# Patient Record
Sex: FEMALE | Race: WHITE | NOT HISPANIC OR LATINO | Employment: OTHER | URBAN - NONMETROPOLITAN AREA
[De-identification: names, ages, dates, MRNs, and addresses within clinical notes are randomized per-mention and may not be internally consistent; named-entity substitution may affect disease eponyms.]

---

## 2017-01-29 ENCOUNTER — TRANSCRIBE ORDERS (OUTPATIENT)
Dept: ADMINISTRATIVE | Facility: HOSPITAL | Age: 32
End: 2017-01-29

## 2017-01-29 ENCOUNTER — LAB (OUTPATIENT)
Dept: LAB | Facility: HOSPITAL | Age: 32
End: 2017-01-29

## 2017-01-29 DIAGNOSIS — M32.14: Primary | ICD-10-CM

## 2017-01-29 DIAGNOSIS — M32.14: ICD-10-CM

## 2017-01-29 LAB
ALBUMIN SERPL-MCNC: 4.4 G/DL (ref 3.5–5)
ANION GAP SERPL CALCULATED.3IONS-SCNC: 11.8 MMOL/L (ref 3.6–11.2)
BACTERIA UR QL AUTO: ABNORMAL /HPF
BASOPHILS # BLD AUTO: 0.01 10*3/MM3 (ref 0–0.3)
BASOPHILS NFR BLD AUTO: 0.2 % (ref 0–2)
BILIRUB UR QL STRIP: NEGATIVE
BUN BLD-MCNC: 6 MG/DL (ref 7–21)
BUN/CREAT SERPL: 8.2 (ref 7–25)
CALCIUM SPEC-SCNC: 9.2 MG/DL (ref 7.7–10)
CHLORIDE SERPL-SCNC: 109 MMOL/L (ref 99–112)
CLARITY UR: ABNORMAL
CO2 SERPL-SCNC: 21.2 MMOL/L (ref 24.3–31.9)
COLOR UR: YELLOW
CREAT BLD-MCNC: 0.73 MG/DL (ref 0.43–1.29)
CREAT UR-MCNC: 292.5 MG/DL
DEPRECATED RDW RBC AUTO: 44 FL (ref 37–54)
EOSINOPHIL # BLD AUTO: 0.08 10*3/MM3 (ref 0–0.7)
EOSINOPHIL NFR BLD AUTO: 1.5 % (ref 0–5)
ERYTHROCYTE [DISTWIDTH] IN BLOOD BY AUTOMATED COUNT: 13.6 % (ref 11.5–14.5)
GFR SERPL CREATININE-BSD FRML MDRD: 92 ML/MIN/1.73
GLUCOSE BLD-MCNC: 91 MG/DL (ref 70–110)
GLUCOSE UR STRIP-MCNC: NEGATIVE MG/DL
HCT VFR BLD AUTO: 45.8 % (ref 37–47)
HGB BLD-MCNC: 14.5 G/DL (ref 12–16)
HGB UR QL STRIP.AUTO: NEGATIVE
HYALINE CASTS UR QL AUTO: ABNORMAL /LPF
IMM GRANULOCYTES # BLD: 0.01 10*3/MM3 (ref 0–0.03)
IMM GRANULOCYTES NFR BLD: 0.2 % (ref 0–0.5)
KETONES UR QL STRIP: NEGATIVE
LEUKOCYTE ESTERASE UR QL STRIP.AUTO: ABNORMAL
LYMPHOCYTES # BLD AUTO: 2.04 10*3/MM3 (ref 1–3)
LYMPHOCYTES NFR BLD AUTO: 37.8 % (ref 21–51)
MCH RBC QN AUTO: 27.7 PG (ref 27–33)
MCHC RBC AUTO-ENTMCNC: 31.7 G/DL (ref 33–37)
MCV RBC AUTO: 87.6 FL (ref 80–94)
MICRO TOTAL PROTEIN: 18.3 MG/DL
MONOCYTES # BLD AUTO: 0.49 10*3/MM3 (ref 0.1–0.9)
MONOCYTES NFR BLD AUTO: 9.1 % (ref 0–10)
NEUTROPHILS # BLD AUTO: 2.77 10*3/MM3 (ref 1.4–6.5)
NEUTROPHILS NFR BLD AUTO: 51.2 % (ref 30–70)
NITRITE UR QL STRIP: NEGATIVE
PH UR STRIP.AUTO: <=5 [PH] (ref 5–8)
PHOSPHATE SERPL-MCNC: 3.5 MG/DL (ref 2.7–4.5)
PLATELET # BLD AUTO: 159 10*3/MM3 (ref 130–400)
PMV BLD AUTO: 11.2 FL (ref 6–10)
POTASSIUM BLD-SCNC: 4.2 MMOL/L (ref 3.5–5.3)
PROT UR QL STRIP: ABNORMAL
RBC # BLD AUTO: 5.23 10*6/MM3 (ref 4.2–5.4)
RBC # UR: ABNORMAL /HPF
REF LAB TEST METHOD: ABNORMAL
SODIUM BLD-SCNC: 142 MMOL/L (ref 135–153)
SP GR UR STRIP: 1.02 (ref 1–1.03)
SQUAMOUS #/AREA URNS HPF: ABNORMAL /HPF
UROBILINOGEN UR QL STRIP: ABNORMAL
WBC NRBC COR # BLD: 5.4 10*3/MM3 (ref 4.5–12.5)
WBC UR QL AUTO: ABNORMAL /HPF
YEAST URNS QL MICRO: ABNORMAL /HPF

## 2017-01-29 PROCEDURE — 80069 RENAL FUNCTION PANEL: CPT | Performed by: INTERNAL MEDICINE

## 2017-01-29 PROCEDURE — 84156 ASSAY OF PROTEIN URINE: CPT | Performed by: INTERNAL MEDICINE

## 2017-01-29 PROCEDURE — 81001 URINALYSIS AUTO W/SCOPE: CPT | Performed by: INTERNAL MEDICINE

## 2017-01-29 PROCEDURE — 82570 ASSAY OF URINE CREATININE: CPT | Performed by: INTERNAL MEDICINE

## 2017-01-29 PROCEDURE — 36415 COLL VENOUS BLD VENIPUNCTURE: CPT

## 2017-01-29 PROCEDURE — 85025 COMPLETE CBC W/AUTO DIFF WBC: CPT | Performed by: INTERNAL MEDICINE

## 2017-02-14 ENCOUNTER — LAB (OUTPATIENT)
Dept: LAB | Facility: HOSPITAL | Age: 32
End: 2017-02-14

## 2017-02-14 ENCOUNTER — TRANSCRIBE ORDERS (OUTPATIENT)
Dept: ADMINISTRATIVE | Facility: HOSPITAL | Age: 32
End: 2017-02-14

## 2017-02-14 DIAGNOSIS — G40.209 COMPLEX PARTIAL SEIZURE EVOLVING TO GENERALIZED SEIZURE (HCC): ICD-10-CM

## 2017-02-14 DIAGNOSIS — G40.209 COMPLEX PARTIAL SEIZURE EVOLVING TO GENERALIZED SEIZURE (HCC): Primary | ICD-10-CM

## 2017-02-14 LAB
ALBUMIN SERPL-MCNC: 4.5 G/DL (ref 3.5–5)
ALBUMIN/GLOB SERPL: 1.6 G/DL (ref 1.5–2.5)
ALP SERPL-CCNC: 67 U/L (ref 35–104)
ALT SERPL W P-5'-P-CCNC: 18 U/L (ref 10–36)
ANION GAP SERPL CALCULATED.3IONS-SCNC: 3.1 MMOL/L (ref 3.6–11.2)
AST SERPL-CCNC: 27 U/L (ref 10–30)
BASOPHILS # BLD AUTO: 0.01 10*3/MM3 (ref 0–0.3)
BASOPHILS NFR BLD AUTO: 0.2 % (ref 0–2)
BILIRUB SERPL-MCNC: 0.2 MG/DL (ref 0.2–1.8)
BUN BLD-MCNC: 8 MG/DL (ref 7–21)
BUN/CREAT SERPL: 11.4 (ref 7–25)
CALCIUM SPEC-SCNC: 9.5 MG/DL (ref 7.7–10)
CHLORIDE SERPL-SCNC: 107 MMOL/L (ref 99–112)
CO2 SERPL-SCNC: 29.9 MMOL/L (ref 24.3–31.9)
CREAT BLD-MCNC: 0.7 MG/DL (ref 0.43–1.29)
DEPRECATED RDW RBC AUTO: 42.5 FL (ref 37–54)
EOSINOPHIL # BLD AUTO: 0.12 10*3/MM3 (ref 0–0.7)
EOSINOPHIL NFR BLD AUTO: 2.6 % (ref 0–5)
ERYTHROCYTE [DISTWIDTH] IN BLOOD BY AUTOMATED COUNT: 13.5 % (ref 11.5–14.5)
GFR SERPL CREATININE-BSD FRML MDRD: 97 ML/MIN/1.73
GLOBULIN UR ELPH-MCNC: 2.8 GM/DL
GLUCOSE BLD-MCNC: 87 MG/DL (ref 70–110)
HCT VFR BLD AUTO: 43.1 % (ref 37–47)
HGB BLD-MCNC: 13.5 G/DL (ref 12–16)
IMM GRANULOCYTES # BLD: 0 10*3/MM3 (ref 0–0.03)
IMM GRANULOCYTES NFR BLD: 0 % (ref 0–0.5)
LYMPHOCYTES # BLD AUTO: 2.18 10*3/MM3 (ref 1–3)
LYMPHOCYTES NFR BLD AUTO: 46.8 % (ref 21–51)
MCH RBC QN AUTO: 27.5 PG (ref 27–33)
MCHC RBC AUTO-ENTMCNC: 31.3 G/DL (ref 33–37)
MCV RBC AUTO: 87.8 FL (ref 80–94)
MONOCYTES # BLD AUTO: 0.45 10*3/MM3 (ref 0.1–0.9)
MONOCYTES NFR BLD AUTO: 9.7 % (ref 0–10)
NEUTROPHILS # BLD AUTO: 1.9 10*3/MM3 (ref 1.4–6.5)
NEUTROPHILS NFR BLD AUTO: 40.7 % (ref 30–70)
OSMOLALITY SERPL CALC.SUM OF ELEC: 277.1 MOSM/KG (ref 273–305)
PLATELET # BLD AUTO: 206 10*3/MM3 (ref 130–400)
PMV BLD AUTO: 11.3 FL (ref 6–10)
POTASSIUM BLD-SCNC: 4.1 MMOL/L (ref 3.5–5.3)
PROT SERPL-MCNC: 7.3 G/DL (ref 6–8)
RBC # BLD AUTO: 4.91 10*6/MM3 (ref 4.2–5.4)
SODIUM BLD-SCNC: 140 MMOL/L (ref 135–153)
WBC NRBC COR # BLD: 4.66 10*3/MM3 (ref 4.5–12.5)

## 2017-02-14 PROCEDURE — 80053 COMPREHEN METABOLIC PANEL: CPT | Performed by: PSYCHIATRY & NEUROLOGY

## 2017-02-14 PROCEDURE — 36415 COLL VENOUS BLD VENIPUNCTURE: CPT

## 2017-02-14 PROCEDURE — 85025 COMPLETE CBC W/AUTO DIFF WBC: CPT | Performed by: PSYCHIATRY & NEUROLOGY

## 2017-07-27 ENCOUNTER — OFFICE VISIT (OUTPATIENT)
Dept: UROLOGY | Facility: CLINIC | Age: 32
End: 2017-07-27

## 2017-07-27 ENCOUNTER — HOSPITAL ENCOUNTER (OUTPATIENT)
Dept: GENERAL RADIOLOGY | Facility: HOSPITAL | Age: 32
Discharge: HOME | End: 2017-07-27
Admitting: NURSE PRACTITIONER

## 2017-07-27 DIAGNOSIS — N20.0 RENAL STONE: Primary | ICD-10-CM

## 2017-07-27 DIAGNOSIS — N20.0 RENAL STONES: Primary | ICD-10-CM

## 2017-07-27 LAB
B-HCG UR QL: NEGATIVE
INTERNAL NEGATIVE CONTROL: NEGATIVE
INTERNAL POSITIVE CONTROL: POSITIVE
Lab: NORMAL

## 2017-07-27 PROCEDURE — 99213 OFFICE O/P EST LOW 20 MIN: CPT | Performed by: NURSE PRACTITIONER

## 2017-07-27 PROCEDURE — 74000 XR ABDOMEN KUB: CPT | Performed by: RADIOLOGY

## 2017-07-27 PROCEDURE — 74000 HC ABDOMEN KUB: CPT

## 2017-07-27 NOTE — PROGRESS NOTES
Chief Complaint:          Chief Complaint   Patient presents with   • Nephrolithiasis       HPI:   32 y.o. female being seen in the office today for 1 year follow-up for history of renal stones. Patient had KUB prior to appointment today. Patient denies any difficulty with urination nor does she complain of any back or flank pain.    HPI        Past Medical History:        Past Medical History:   Diagnosis Date   • Hypertension    • Lupus    • Migraine    • Stroke          Current Meds:     Current Outpatient Prescriptions   Medication Sig Dispense Refill   • APTIOM 800 MG tablet      • HYDROcodone-acetaminophen (NORCO) 5-325 MG per tablet      • hydroxychloroquine (PLAQUENIL) 200 MG tablet      • nadolol (CORGARD) 40 MG tablet      • naproxen (NAPROSYN) 500 MG tablet      • nortriptyline (PAMELOR) 10 MG capsule      • promethazine (PHENERGAN) 25 MG tablet Take  by mouth.     • promethazine (PHENERGAN) 25 MG tablet      • SUMAtriptan (IMITREX) 100 MG tablet      • tiZANidine (ZANAFLEX) 2 MG tablet      • traMADol (ULTRAM) 50 MG tablet Take  by mouth.       No current facility-administered medications for this visit.         Allergies:      Allergies   Allergen Reactions   • Dilantin [Phenytoin]         Past Surgical History:     Past Surgical History:   Procedure Laterality Date   • GALLBLADDER SURGERY     • KIDNEY STONE SURGERY           Social History:     Social History     Social History   • Marital status: Single     Spouse name: N/A   • Number of children: N/A   • Years of education: N/A     Occupational History   • Not on file.     Social History Main Topics   • Smoking status: Never Smoker   • Smokeless tobacco: Not on file   • Alcohol use No   • Drug use: No   • Sexual activity: Not on file     Other Topics Concern   • Not on file     Social History Narrative       Family History:     Family History   Problem Relation Age of Onset   • Diabetes Father        Review of Systems:     Review of Systems    Constitutional: Negative for chills, fatigue and fever.   Respiratory: Negative for cough, shortness of breath and wheezing.    Cardiovascular: Negative for leg swelling.   Gastrointestinal: Negative for abdominal pain, nausea and vomiting.   Musculoskeletal: Negative for back pain and joint swelling.   Neurological: Negative for dizziness and headaches.   Psychiatric/Behavioral: Negative for confusion.       Physical Exam:     Physical Exam   Constitutional: She is oriented to person, place, and time. She appears well-developed and well-nourished. No distress.   Musculoskeletal: Normal range of motion.   Neurological: She is alert and oriented to person, place, and time.   Skin: Skin is warm and dry. No rash noted. She is not diaphoretic. No erythema. No pallor.   Psychiatric: She has a normal mood and affect. Her behavior is normal. Judgment and thought content normal.   Nursing note and vitals reviewed.      Procedure:     No notes on file      Assessment:     Encounter Diagnosis   Name Primary?   • Renal stone Yes     No orders of the defined types were placed in this encounter.      Plan:   Reviewed the KUB with the patient revealing a persistent 9 mm stone in the left renal collecting system in a nonobstructive position. Discussed treatment options with the patient regarding conservative monitoring versus an ESWL. Patient states she wants to have stone removed so we will schedule her for an ESWL or treatment of the stone. Discussed the procedure with the patient and she is a agreeable.    Counseling was given to patient for the following topics diagnostic results, patient and family education, impressions and risks and benefits of treatment options. and the interim medical history and current results were reviewed.  A treatment plan with follow-up was made. Total time of the encounter was 18 minutes and 18 minutes were spent discussing Renal stone [N20.0] face-to-face.       This document has been  electronically signed by DANIEL Abad July 27, 2017 10:53 AM

## 2017-08-14 PROBLEM — N20.0 RENAL STONE: Status: ACTIVE | Noted: 2017-08-14

## 2017-08-16 ENCOUNTER — DOCUMENTATION (OUTPATIENT)
Dept: UROLOGY | Facility: CLINIC | Age: 32
End: 2017-08-16

## 2017-08-16 NOTE — PROGRESS NOTES
Patient notified of all pre-op and surgery appointment dates and times. Pre-op instructions were reviewed with the patient, patient voiced understanding.

## 2017-08-17 ENCOUNTER — APPOINTMENT (OUTPATIENT)
Dept: PREADMISSION TESTING | Facility: HOSPITAL | Age: 32
End: 2017-08-17

## 2017-08-17 VITALS — BODY MASS INDEX: 32.58 KG/M2 | WEIGHT: 215 LBS | HEIGHT: 68 IN

## 2017-08-17 DIAGNOSIS — N20.0 RENAL STONE: ICD-10-CM

## 2017-08-17 LAB
ANION GAP SERPL CALCULATED.3IONS-SCNC: 5.6 MMOL/L (ref 3.6–11.2)
BUN BLD-MCNC: 13 MG/DL (ref 7–21)
BUN/CREAT SERPL: 20.3 (ref 7–25)
CALCIUM SPEC-SCNC: 9.7 MG/DL (ref 7.7–10)
CHLORIDE SERPL-SCNC: 112 MMOL/L (ref 99–112)
CO2 SERPL-SCNC: 23.4 MMOL/L (ref 24.3–31.9)
CREAT BLD-MCNC: 0.64 MG/DL (ref 0.43–1.29)
DEPRECATED RDW RBC AUTO: 45.4 FL (ref 37–54)
ERYTHROCYTE [DISTWIDTH] IN BLOOD BY AUTOMATED COUNT: 14.1 % (ref 11.5–14.5)
GFR SERPL CREATININE-BSD FRML MDRD: 107 ML/MIN/1.73
GLUCOSE BLD-MCNC: 87 MG/DL (ref 70–110)
HCT VFR BLD AUTO: 43.2 % (ref 37–47)
HGB BLD-MCNC: 13.8 G/DL (ref 12–16)
MCH RBC QN AUTO: 28.2 PG (ref 27–33)
MCHC RBC AUTO-ENTMCNC: 31.9 G/DL (ref 33–37)
MCV RBC AUTO: 88.2 FL (ref 80–94)
OSMOLALITY SERPL CALC.SUM OF ELEC: 280.7 MOSM/KG (ref 273–305)
PLATELET # BLD AUTO: 170 10*3/MM3 (ref 130–400)
PMV BLD AUTO: 11.1 FL (ref 6–10)
POTASSIUM BLD-SCNC: 4 MMOL/L (ref 3.5–5.3)
RBC # BLD AUTO: 4.9 10*6/MM3 (ref 4.2–5.4)
SODIUM BLD-SCNC: 141 MMOL/L (ref 135–153)
WBC NRBC COR # BLD: 7.29 10*3/MM3 (ref 4.5–12.5)

## 2017-08-17 PROCEDURE — 36415 COLL VENOUS BLD VENIPUNCTURE: CPT

## 2017-08-17 PROCEDURE — 85027 COMPLETE CBC AUTOMATED: CPT | Performed by: NURSE PRACTITIONER

## 2017-08-17 PROCEDURE — 80048 BASIC METABOLIC PNL TOTAL CA: CPT | Performed by: NURSE PRACTITIONER

## 2017-08-17 RX ORDER — DULOXETIN HYDROCHLORIDE 30 MG/1
30 CAPSULE, DELAYED RELEASE ORAL DAILY
COMMUNITY
End: 2019-05-10

## 2017-08-17 RX ORDER — RANITIDINE 150 MG/1
150 TABLET ORAL NIGHTLY
COMMUNITY
End: 2019-05-10 | Stop reason: ALTCHOICE

## 2017-08-17 NOTE — DISCHARGE INSTRUCTIONS
0830---8-   ARRIVAL TIME  TAKE the following medications the morning of surgery:  All heart or blood pressure medications    HOLD all diabetic medications the morning of surgery as ordered by physician.    Please discontinue all blood thinners and anticoagulants (except aspirin) prior to surgery as per your surgeon and cardiologist instructions.  Aspirin may be continued up to the day prior to surgery.     General Instructions:  · Do not eat or drink after midnight: includes water, mints, or gum. You may brush your teeth.  Dental appliances that are removable must be taken out day of surgery.  · Do not smoke, chew tobacco, or drink alcohol.  · Bring medications in original bottles, any inhalers and if applicable your C-PAP/BI-PAP machine.  · Bring any papers given to you in the doctor's office.  · Wear clean comfortable clothes and socks.  · Do not wear contact lenses or make-up. Bring a case for your glasses if applicable.  · Bring crutches or walker if applicable.  · Leave all other valuables and jewelry at home.    If you were given a blood bank ID arm band remember to bring it with you the day of surgery.    Preventing a Surgical Site Infection:  Shower on the morning of surgery using a fresh bar of anti-bacterial soap (such as Dial) and clean washcloth. Dry with a clean towel and dress in clean clothing.  For 2 to 3 days before surgery, avoid shaving with a razor near where you will have surgery because the razor can irritate skin and make it easier to develop an infection. Ask your surgeon if you will be receiving antibiotics prior to surgery.  Make sure you, your family, and all healthcare providers clear their hands with soap and water or an alcohol-based hand  before caring for you or your wound.  If at all possible, quit smoking as many days before surgery as you can.    Day of surgery:  Upon arrival, a Pre-op nurse and Anesthesiologist will review your health history, obtain vital signs,  and answer questions you may have. The only belongings needed at this time will be your home medications and if applicable your C-PAP/BI-PAP machine. If you are staying overnight your family can leave the rest of your belongings in the car and bring them to your room later. A Pre-op nurse will start an IV and you may receive medication in preparation for surgery, including something to help you relax. Your family will be able to see you in the Pre-op area. While you are in surgery your family should notify the waiting room  if they leave the waiting room area and provide a contact phone number.    Please be aware that surgery does come with discomfort. We want to make every effort to control your discomfort so please discuss any uncontrolled symptoms with your nurse. Your doctor will most likely have prescribed pain medications.    If you are going home after surgery you will receive individualized written care instructions before being discharged. A responsible adult must drive you to and from the hospital on the day of surgery and stay with you for 24 hours.    If you are staying overnight following surgery, you will be transported to your hospital room following the recovery period.  [facility] has all private rooms.    If you have any questions please call Pre-Admission Testing at [phone].  Deductibles and co-payments are collected on the day of service. Please be prepared to pay the required co-pay, deductible or deposit on the day of service as defined by your plan.

## 2017-08-18 ENCOUNTER — ANESTHESIA EVENT (OUTPATIENT)
Dept: PERIOP | Facility: HOSPITAL | Age: 32
End: 2017-08-18

## 2017-08-18 ENCOUNTER — ANESTHESIA (OUTPATIENT)
Dept: PERIOP | Facility: HOSPITAL | Age: 32
End: 2017-08-18

## 2017-08-18 ENCOUNTER — HOSPITAL ENCOUNTER (OUTPATIENT)
Facility: HOSPITAL | Age: 32
Setting detail: HOSPITAL OUTPATIENT SURGERY
Discharge: HOME | End: 2017-08-18
Attending: UROLOGY | Admitting: UROLOGY

## 2017-08-18 VITALS
HEART RATE: 72 BPM | RESPIRATION RATE: 16 BRPM | OXYGEN SATURATION: 98 % | TEMPERATURE: 97.9 F | BODY MASS INDEX: 33.34 KG/M2 | DIASTOLIC BLOOD PRESSURE: 74 MMHG | WEIGHT: 220 LBS | SYSTOLIC BLOOD PRESSURE: 132 MMHG | HEIGHT: 68 IN

## 2017-08-18 DIAGNOSIS — N20.0 RENAL STONE: ICD-10-CM

## 2017-08-18 PROCEDURE — C1769 GUIDE WIRE: HCPCS | Performed by: UROLOGY

## 2017-08-18 PROCEDURE — 25010000002 MIDAZOLAM PER 1 MG: Performed by: NURSE ANESTHETIST, CERTIFIED REGISTERED

## 2017-08-18 PROCEDURE — 25010000002 FENTANYL CITRATE (PF) 100 MCG/2ML SOLUTION: Performed by: NURSE ANESTHETIST, CERTIFIED REGISTERED

## 2017-08-18 PROCEDURE — 25010000003 CEFAZOLIN PER 500 MG: Performed by: NURSE PRACTITIONER

## 2017-08-18 PROCEDURE — 25010000002 MIDAZOLAM PER 1 MG: Performed by: ANESTHESIOLOGY

## 2017-08-18 PROCEDURE — 25010000002 ONDANSETRON PER 1 MG: Performed by: NURSE ANESTHETIST, CERTIFIED REGISTERED

## 2017-08-18 PROCEDURE — C1894 INTRO/SHEATH, NON-LASER: HCPCS | Performed by: UROLOGY

## 2017-08-18 PROCEDURE — 50590 FRAGMENTING OF KIDNEY STONE: CPT | Performed by: UROLOGY

## 2017-08-18 PROCEDURE — 25010000002 PROPOFOL 10 MG/ML EMULSION: Performed by: NURSE ANESTHETIST, CERTIFIED REGISTERED

## 2017-08-18 PROCEDURE — 25010000002 NEOSTIGMINE 10 MG/10ML SOLUTION: Performed by: NURSE ANESTHETIST, CERTIFIED REGISTERED

## 2017-08-18 PROCEDURE — 25010000002 DEXAMETHASONE PER 1 MG: Performed by: NURSE ANESTHETIST, CERTIFIED REGISTERED

## 2017-08-18 RX ORDER — NEOSTIGMINE METHYLSULFATE 1 MG/ML
INJECTION, SOLUTION INTRAVENOUS AS NEEDED
Status: DISCONTINUED | OUTPATIENT
Start: 2017-08-18 | End: 2017-08-18 | Stop reason: SURG

## 2017-08-18 RX ORDER — DEXAMETHASONE SODIUM PHOSPHATE 10 MG/ML
INJECTION INTRAMUSCULAR; INTRAVENOUS AS NEEDED
Status: DISCONTINUED | OUTPATIENT
Start: 2017-08-18 | End: 2017-08-18 | Stop reason: SURG

## 2017-08-18 RX ORDER — SODIUM CHLORIDE 0.9 % (FLUSH) 0.9 %
1-10 SYRINGE (ML) INJECTION AS NEEDED
Status: DISCONTINUED | OUTPATIENT
Start: 2017-08-18 | End: 2017-08-18 | Stop reason: HOSPADM

## 2017-08-18 RX ORDER — MIDAZOLAM HYDROCHLORIDE 1 MG/ML
1 INJECTION INTRAMUSCULAR; INTRAVENOUS
Status: DISCONTINUED | OUTPATIENT
Start: 2017-08-18 | End: 2017-08-18 | Stop reason: HOSPADM

## 2017-08-18 RX ORDER — MIDAZOLAM HYDROCHLORIDE 1 MG/ML
2 INJECTION INTRAMUSCULAR; INTRAVENOUS
Status: DISCONTINUED | OUTPATIENT
Start: 2017-08-18 | End: 2017-08-18 | Stop reason: HOSPADM

## 2017-08-18 RX ORDER — SODIUM CHLORIDE, SODIUM LACTATE, POTASSIUM CHLORIDE, CALCIUM CHLORIDE 600; 310; 30; 20 MG/100ML; MG/100ML; MG/100ML; MG/100ML
125 INJECTION, SOLUTION INTRAVENOUS CONTINUOUS
Status: DISCONTINUED | OUTPATIENT
Start: 2017-08-18 | End: 2017-08-18 | Stop reason: HOSPADM

## 2017-08-18 RX ORDER — ONDANSETRON 2 MG/ML
4 INJECTION INTRAMUSCULAR; INTRAVENOUS ONCE AS NEEDED
Status: DISCONTINUED | OUTPATIENT
Start: 2017-08-18 | End: 2017-08-18 | Stop reason: HOSPADM

## 2017-08-18 RX ORDER — OXYCODONE HYDROCHLORIDE AND ACETAMINOPHEN 5; 325 MG/1; MG/1
TABLET ORAL
Qty: 40 TABLET | Refills: 0 | Status: SHIPPED | OUTPATIENT
Start: 2017-08-18 | End: 2019-05-10

## 2017-08-18 RX ORDER — SULFAMETHOXAZOLE AND TRIMETHOPRIM 800; 160 MG/1; MG/1
1 TABLET ORAL 2 TIMES DAILY
Qty: 20 TABLET | Refills: 0 | Status: SHIPPED | OUTPATIENT
Start: 2017-08-18 | End: 2019-05-10

## 2017-08-18 RX ORDER — ROCURONIUM BROMIDE 10 MG/ML
INJECTION, SOLUTION INTRAVENOUS AS NEEDED
Status: DISCONTINUED | OUTPATIENT
Start: 2017-08-18 | End: 2017-08-18 | Stop reason: SURG

## 2017-08-18 RX ORDER — FENTANYL CITRATE 50 UG/ML
50 INJECTION, SOLUTION INTRAMUSCULAR; INTRAVENOUS
Status: DISCONTINUED | OUTPATIENT
Start: 2017-08-18 | End: 2017-08-18 | Stop reason: HOSPADM

## 2017-08-18 RX ORDER — ONDANSETRON 2 MG/ML
INJECTION INTRAMUSCULAR; INTRAVENOUS AS NEEDED
Status: DISCONTINUED | OUTPATIENT
Start: 2017-08-18 | End: 2017-08-18 | Stop reason: SURG

## 2017-08-18 RX ORDER — MAGNESIUM HYDROXIDE 1200 MG/15ML
LIQUID ORAL AS NEEDED
Status: DISCONTINUED | OUTPATIENT
Start: 2017-08-18 | End: 2017-08-18 | Stop reason: HOSPADM

## 2017-08-18 RX ORDER — FAMOTIDINE 10 MG/ML
INJECTION, SOLUTION INTRAVENOUS AS NEEDED
Status: DISCONTINUED | OUTPATIENT
Start: 2017-08-18 | End: 2017-08-18 | Stop reason: SURG

## 2017-08-18 RX ORDER — OXYCODONE HYDROCHLORIDE AND ACETAMINOPHEN 5; 325 MG/1; MG/1
1 TABLET ORAL ONCE AS NEEDED
Status: DISCONTINUED | OUTPATIENT
Start: 2017-08-18 | End: 2017-08-18 | Stop reason: HOSPADM

## 2017-08-18 RX ORDER — PROPOFOL 10 MG/ML
VIAL (ML) INTRAVENOUS AS NEEDED
Status: DISCONTINUED | OUTPATIENT
Start: 2017-08-18 | End: 2017-08-18 | Stop reason: SURG

## 2017-08-18 RX ORDER — FENTANYL CITRATE 50 UG/ML
INJECTION, SOLUTION INTRAMUSCULAR; INTRAVENOUS AS NEEDED
Status: DISCONTINUED | OUTPATIENT
Start: 2017-08-18 | End: 2017-08-18 | Stop reason: SURG

## 2017-08-18 RX ORDER — GLYCOPYRROLATE 0.2 MG/ML
INJECTION INTRAMUSCULAR; INTRAVENOUS AS NEEDED
Status: DISCONTINUED | OUTPATIENT
Start: 2017-08-18 | End: 2017-08-18 | Stop reason: SURG

## 2017-08-18 RX ORDER — IPRATROPIUM BROMIDE AND ALBUTEROL SULFATE 2.5; .5 MG/3ML; MG/3ML
3 SOLUTION RESPIRATORY (INHALATION) ONCE AS NEEDED
Status: DISCONTINUED | OUTPATIENT
Start: 2017-08-18 | End: 2017-08-18 | Stop reason: HOSPADM

## 2017-08-18 RX ORDER — LIDOCAINE HYDROCHLORIDE 20 MG/ML
INJECTION, SOLUTION INFILTRATION; PERINEURAL AS NEEDED
Status: DISCONTINUED | OUTPATIENT
Start: 2017-08-18 | End: 2017-08-18 | Stop reason: SURG

## 2017-08-18 RX ORDER — MIDAZOLAM HYDROCHLORIDE 1 MG/ML
INJECTION INTRAMUSCULAR; INTRAVENOUS AS NEEDED
Status: DISCONTINUED | OUTPATIENT
Start: 2017-08-18 | End: 2017-08-18 | Stop reason: SURG

## 2017-08-18 RX ORDER — MEPERIDINE HYDROCHLORIDE 25 MG/ML
12.5 INJECTION INTRAMUSCULAR; INTRAVENOUS; SUBCUTANEOUS
Status: DISCONTINUED | OUTPATIENT
Start: 2017-08-18 | End: 2017-08-18 | Stop reason: HOSPADM

## 2017-08-18 RX ADMIN — ONDANSETRON 4 MG: 2 INJECTION, SOLUTION INTRAMUSCULAR; INTRAVENOUS at 10:54

## 2017-08-18 RX ADMIN — CEFAZOLIN SODIUM 2 G: 2 SOLUTION INTRAVENOUS at 10:50

## 2017-08-18 RX ADMIN — ROCURONIUM BROMIDE 30 MG: 10 INJECTION INTRAVENOUS at 10:54

## 2017-08-18 RX ADMIN — FENTANYL CITRATE 100 MCG: 50 INJECTION INTRAMUSCULAR; INTRAVENOUS at 10:50

## 2017-08-18 RX ADMIN — MIDAZOLAM HYDROCHLORIDE 2 MG: 1 INJECTION, SOLUTION INTRAMUSCULAR; INTRAVENOUS at 10:50

## 2017-08-18 RX ADMIN — LIDOCAINE HYDROCHLORIDE 60 MG: 20 INJECTION, SOLUTION INFILTRATION; PERINEURAL at 10:54

## 2017-08-18 RX ADMIN — SODIUM CHLORIDE, POTASSIUM CHLORIDE, SODIUM LACTATE AND CALCIUM CHLORIDE 125 ML/HR: 600; 310; 30; 20 INJECTION, SOLUTION INTRAVENOUS at 09:48

## 2017-08-18 RX ADMIN — PROPOFOL 150 MG: 10 INJECTION, EMULSION INTRAVENOUS at 10:54

## 2017-08-18 RX ADMIN — FENTANYL CITRATE 50 MCG: 50 INJECTION INTRAMUSCULAR; INTRAVENOUS at 12:15

## 2017-08-18 RX ADMIN — ONDANSETRON 4 MG: 2 INJECTION, SOLUTION INTRAMUSCULAR; INTRAVENOUS at 12:03

## 2017-08-18 RX ADMIN — GLYCOPYRROLATE 0.4 MG: 0.2 INJECTION, SOLUTION INTRAMUSCULAR; INTRAVENOUS at 11:51

## 2017-08-18 RX ADMIN — MIDAZOLAM HYDROCHLORIDE 2 MG: 1 INJECTION, SOLUTION INTRAMUSCULAR; INTRAVENOUS at 09:55

## 2017-08-18 RX ADMIN — FAMOTIDINE 20 MG: 10 INJECTION, SOLUTION INTRAVENOUS at 10:54

## 2017-08-18 RX ADMIN — DEXAMETHASONE SODIUM PHOSPHATE 4 MG: 10 INJECTION INTRAMUSCULAR; INTRAVENOUS at 10:54

## 2017-08-18 RX ADMIN — NEOSTIGMINE METHYLSULFATE 3 MG: 1 INJECTION, SOLUTION INTRAVENOUS at 11:51

## 2017-08-18 NOTE — ANESTHESIA PROCEDURE NOTES
Airway  Urgency: elective    Date/Time: 8/18/2017 10:55 AM  Airway not difficult    General Information and Staff    Patient location during procedure: OR  Anesthesiologist: LIZA BRAUN  CRNA: SHMUEL ABBOTT    Indications and Patient Condition  Indications for airway management: airway protection    Preoxygenated: yes  MILS maintained throughout  Mask difficulty assessment: 0 - not attempted    Final Airway Details  Final airway type: endotracheal airway      Successful airway: ETT  Cuffed: yes   Successful intubation technique: direct laryngoscopy  Facilitating devices/methods: intubating stylet  Endotracheal tube insertion site: oral  Blade: Omid  Blade size: #3  ETT size: 7.0 mm  Cormack-Lehane Classification: grade IIa - partial view of glottis  Placement verified by: chest auscultation, capnometry and palpation of cuff   Cuff volume (mL): 10  Measured from: lips  ETT to lips (cm): 21  Number of attempts at approach: 1    Additional Comments  Dentition and lips same as preop

## 2017-08-18 NOTE — H&P (VIEW-ONLY)
Chief Complaint:              Chief Complaint   Patient presents with   • Nephrolithiasis         HPI:   32 y.o. female being seen in the office today for 1 year follow-up for history of renal stones. Patient had KUB prior to appointment today. Patient denies any difficulty with urination nor does she complain of any back or flank pain.     HPI           Past Medical History:           Medical History         Past Medical History:   Diagnosis Date   • Hypertension     • Lupus     • Migraine     • Stroke                 Current Meds:       Current Medications           Current Outpatient Prescriptions   Medication Sig Dispense Refill   • APTIOM 800 MG tablet         • HYDROcodone-acetaminophen (NORCO) 5-325 MG per tablet         • hydroxychloroquine (PLAQUENIL) 200 MG tablet         • nadolol (CORGARD) 40 MG tablet         • naproxen (NAPROSYN) 500 MG tablet         • nortriptyline (PAMELOR) 10 MG capsule         • promethazine (PHENERGAN) 25 MG tablet Take  by mouth.       • promethazine (PHENERGAN) 25 MG tablet         • SUMAtriptan (IMITREX) 100 MG tablet         • tiZANidine (ZANAFLEX) 2 MG tablet         • traMADol (ULTRAM) 50 MG tablet Take  by mouth.          No current facility-administered medications for this visit.              Allergies:            Allergies   Allergen Reactions   • Dilantin [Phenytoin]            Past Surgical History:       Surgical History          Past Surgical History:   Procedure Laterality Date   • GALLBLADDER SURGERY       • KIDNEY STONE SURGERY                   Social History:       Social History    Social History            Social History   • Marital status: Single       Spouse name: N/A   • Number of children: N/A   • Years of education: N/A          Occupational History   • Not on file.           Social History Main Topics   • Smoking status: Never Smoker   • Smokeless tobacco: Not on file   • Alcohol use No   • Drug use: No   • Sexual activity: Not on file           Other  Topics Concern   • Not on file      Social History Narrative            Family History:            Family History   Problem Relation Age of Onset   • Diabetes Father           Review of Systems:      Review of Systems   Constitutional: Negative for chills, fatigue and fever.   Respiratory: Negative for cough, shortness of breath and wheezing.    Cardiovascular: Negative for leg swelling.   Gastrointestinal: Negative for abdominal pain, nausea and vomiting.   Musculoskeletal: Negative for back pain and joint swelling.   Neurological: Negative for dizziness and headaches.   Psychiatric/Behavioral: Negative for confusion.         Physical Exam:      Physical Exam   Constitutional: She is oriented to person, place, and time. She appears well-developed and well-nourished. No distress.   Musculoskeletal: Normal range of motion.   Neurological: She is alert and oriented to person, place, and time.   Skin: Skin is warm and dry. No rash noted. She is not diaphoretic. No erythema. No pallor.   Psychiatric: She has a normal mood and affect. Her behavior is normal. Judgment and thought content normal.   Nursing note and vitals reviewed.        Procedure:      No notes on file        Assessment:           Encounter Diagnosis   Name Primary?   • Renal stone Yes      No orders of the defined types were placed in this encounter.        Plan:   Reviewed the KUB with the patient revealing a persistent 9 mm stone in the left renal collecting system in a nonobstructive position. Discussed treatment options with the patient regarding conservative monitoring versus an ESWL. Patient states she wants to have stone removed so we will schedule her for an ESWL or treatment of the stone. Discussed the procedure with the patient and she is a agreeable.

## 2017-08-18 NOTE — OP NOTE
EXTRACORPOREAL SHOCKWAVE LITHOTRIPSY  Procedure Report    Patient Name:  Livia Benítez  YOB: 1985    Date of Surgery:  8/18/2017     Indications:  Persistent 1 cm calcification over left kidney    Pre-op Diagnosis:  Renal stone [N20.0]          Post-op Diagnosis:   Post-Op Diagnosis Codes:     * Renal stone [N20.0] possible nephrocalcinosis         Procedure/CPT® Codes:      Procedure(s): Left  EXTRACORPOREAL SHOCKWAVE LITHOTRIPSY, FLEXIBLE URETEROSCOPY    Staff:  Surgeon(s):  Driss Wharton MD         Anesthesia: General    Estimated Blood Loss: *No blood loss documented*    Implants:    Nothing was implanted during the procedure    Specimen:                * No specimens in log *      Findings: Pt has a 1 cm calcification seen on fluoroscopy and kub but with flexible ureteroscopy the stone was not seen to be in the left collecting system but on fluoroscopy the ureteroscopy was in the same planar location as the stone    Complications: none    Description of Procedure: pt underwent GA and prepped and draped.  Cystoscopy was performed and an amplatz was passed up the left ureter and then a ureteral access sheath was placed.  Flexible ureteroscopy was performed and the upper, middle and lower pole were examined with simultaneous fluoroscopy multiple times and there was no stone seen in the collecting system.  Because of a possible hidden calyx containing a stone we decided on proceeding with the left ESWL.  Pt has 2400 shocks at 24 kv were delivered to the stone.  Pt was taken to RR.      Driss Wharton MD     Date: 8/18/2017  Time: 11:34 AM

## 2017-08-18 NOTE — ANESTHESIA PREPROCEDURE EVALUATION
Anesthesia Evaluation     no history of anesthetic complications:  NPO Solid Status: > 8 hours  NPO Liquid Status: > 8 hours     Airway   Mallampati: II  TM distance: >3 FB  Neck ROM: full  no difficulty expected  Dental - normal exam     Pulmonary - normal exam   Cardiovascular - normal exam    (+) hypertension, PVD,       Neuro/Psych  (+) seizures, CVA, headaches,    GI/Hepatic/Renal/Endo      Musculoskeletal     Abdominal  - normal exam   Substance History      OB/GYN          Other                                      Anesthesia Plan    ASA 2     general     intravenous induction   Anesthetic plan and risks discussed with patient.

## 2017-08-18 NOTE — ANESTHESIA POSTPROCEDURE EVALUATION
Patient: Livia Benítez    Procedure Summary     Date Anesthesia Start Anesthesia Stop Room / Location    08/18/17 1050 1156 [facility] OR 06 / [facility]       Procedure Diagnosis Surgeon Provider    EXTRACORPOREAL SHOCKWAVE LITHOTRIPSY, FLEXIBLE URETEROSCOPY (Left ) Renal stone  (Renal stone [N20.0]) MD Driss Sears MD          Anesthesia Type: general  Last vitals  /74         Temp 97.9       Pulse    72   Resp     16   SpO2 98%     Post Anesthesia Care and Evaluation    Patient location during evaluation: PHASE II  Patient participation: complete - patient participated  Level of consciousness: awake and alert  Pain score: 1  Pain management: adequate  Airway patency: patent  Anesthetic complications: No anesthetic complications  PONV Status: controlled  Cardiovascular status: acceptable  Respiratory status: acceptable  Hydration status: acceptable

## 2017-10-04 ENCOUNTER — OFFICE VISIT (OUTPATIENT)
Dept: UROLOGY | Facility: CLINIC | Age: 32
End: 2017-10-04

## 2017-10-04 ENCOUNTER — HOSPITAL ENCOUNTER (OUTPATIENT)
Dept: GENERAL RADIOLOGY | Facility: HOSPITAL | Age: 32
Discharge: HOME | End: 2017-10-04
Attending: UROLOGY | Admitting: UROLOGY

## 2017-10-04 DIAGNOSIS — N20.0 RENAL STONES: Primary | ICD-10-CM

## 2017-10-04 DIAGNOSIS — N20.0 RENAL STONE: ICD-10-CM

## 2017-10-04 PROCEDURE — 99024 POSTOP FOLLOW-UP VISIT: CPT | Performed by: UROLOGY

## 2017-10-04 PROCEDURE — 74000 HC ABDOMEN KUB: CPT

## 2017-10-04 PROCEDURE — 74000 XR ABDOMEN KUB: CPT | Performed by: RADIOLOGY

## 2017-10-04 NOTE — PROGRESS NOTES
Chief Complaint:          Chief Complaint   Patient presents with   • Nephrolithiasis       HPI:   32 y.o. female.    HPI  The pt had ureteroscopy that failed to see the 1 cm stone and it was felt to be in a hidden calyx so we proceeded with ESWL treatment on left side.      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   • Dental bridge present    • [place] filter in place     SINCE 2003   • History of transfusion    • History of unsteady gait     SINCE STROKE   • Hypertension    • Kidney stone    • Lupus    • Migraine    • Seizures     LAST ONE 2003   • Stroke     2003         Current Meds:     Current Outpatient Prescriptions   Medication Sig Dispense Refill   • APTIOM 800 MG tablet Take 800 mg by mouth 1 (One) Time.     • DULoxetine (CYMBALTA) 30 MG capsule Take 30 mg by mouth Daily.     • hydroxychloroquine (PLAQUENIL) 200 MG tablet Take 200 mg by mouth Daily.     • nadolol (CORGARD) 40 MG tablet Take 20 mg by mouth Daily.     • nortriptyline (PAMELOR) 10 MG capsule Take 20 mg by mouth Every Night.     • oxyCODONE-acetaminophen (PERCOCET) 5-325 MG per tablet 1 to 2 Tablets Every 6 Hours as needed for PAIN 40 tablet 0   • promethazine (PHENERGAN) 25 MG tablet Take 25 mg by mouth Every 8 (Eight) Hours As Needed.     • raNITIdine (ZANTAC) 150 MG tablet Take 150 mg by mouth Every Night.     • sulfamethoxazole-trimethoprim (BACTRIM DS,SEPTRA DS) 800-160 MG per tablet Take 1 tablet by mouth 2 (Two) Times a Day. 20 tablet 0   • SUMAtriptan (IMITREX) 100 MG tablet Take 100 mg by mouth 1 (One) Time As Needed.     • tiZANidine (ZANAFLEX) 2 MG tablet Take 2 mg by mouth 3 (Three) Times a Day As Needed (TAKES 2 AT BED TIME).     • traMADol (ULTRAM) 50 MG tablet Take  by mouth 3 (Three) Times a Day As Needed.       No current facility-administered medications for this visit.         Allergies:      Allergies   Allergen  Reactions   • Dilantin [Phenytoin]    • Phenytoin Sodium Extended         Past Surgical History:     Past Surgical History:   Procedure Laterality Date   • ABDOMINAL SURGERY     • EXTRACORPOREAL SHOCK WAVE LITHOTRIPSY (ESWL) Left 8/18/2017    Procedure: EXTRACORPOREAL SHOCKWAVE LITHOTRIPSY, FLEXIBLE URETEROSCOPY;  Surgeon: Driss Wharton MD;  Location: [facility];  Service:    • GALLBLADDER SURGERY     • KIDNEY STONE SURGERY           Social History:     Social History     Social History   • Marital status: Single     Spouse name: N/A   • Number of children: N/A   • Years of education: N/A     Occupational History   • Not on file.     Social History Main Topics   • Smoking status: Never Smoker   • Smokeless tobacco: Not on file   • Alcohol use No   • Drug use: No   • Sexual activity: Defer     Other Topics Concern   • Not on file     Social History Narrative       Family History:     Family History   Problem Relation Age of Onset   • Diabetes Father        Review of Systems:     Review of Systems    Physical Exam:     Physical Exam    Procedure:     No notes on file      Assessment:     Encounter Diagnosis   Name Primary?   • Renal stones Yes     No orders of the defined types were placed in this encounter.      Plan:   Will see pt back in one year with kub prior    Counseling was given to patient for the following topics diagnostic results. and the interim medical history and current results were reviewed.  A treatment plan with follow-up was made. Total time of the encounter was 11 minutes and 11 minutes were spent discussing Renal stones [N20.0] face-to-face.        This document has been electronically signed by Driss Wharton MD October 4, 2017 11:09 AM

## 2018-01-29 ENCOUNTER — HOSPITAL ENCOUNTER (EMERGENCY)
Facility: HOSPITAL | Age: 33
End: 2018-01-29

## 2018-01-29 ENCOUNTER — TRANSCRIBE ORDERS (OUTPATIENT)
Dept: ADMINISTRATIVE | Facility: HOSPITAL | Age: 33
End: 2018-01-29

## 2018-01-29 ENCOUNTER — APPOINTMENT (OUTPATIENT)
Dept: LAB | Facility: HOSPITAL | Age: 33
End: 2018-01-29

## 2018-01-29 DIAGNOSIS — M32.14 LUPUS NEPHRITIS (HCC): Primary | ICD-10-CM

## 2018-01-29 LAB
ALBUMIN SERPL-MCNC: 4.9 G/DL (ref 3.5–5)
ANION GAP SERPL CALCULATED.3IONS-SCNC: 6.9 MMOL/L (ref 3.6–11.2)
BACTERIA UR QL AUTO: ABNORMAL /HPF
BILIRUB UR QL STRIP: NEGATIVE
BUN BLD-MCNC: 11 MG/DL (ref 7–21)
BUN/CREAT SERPL: 17.2 (ref 7–25)
CALCIUM SPEC-SCNC: 9.2 MG/DL (ref 7.7–10)
CHLORIDE SERPL-SCNC: 107 MMOL/L (ref 99–112)
CLARITY UR: CLEAR
CO2 SERPL-SCNC: 24.1 MMOL/L (ref 24.3–31.9)
COLOR UR: YELLOW
CREAT BLD-MCNC: 0.64 MG/DL (ref 0.43–1.29)
GFR SERPL CREATININE-BSD FRML MDRD: 107 ML/MIN/1.73
GLUCOSE BLD-MCNC: 85 MG/DL (ref 70–110)
GLUCOSE UR STRIP-MCNC: NEGATIVE MG/DL
HGB UR QL STRIP.AUTO: NEGATIVE
HYALINE CASTS UR QL AUTO: ABNORMAL /LPF
KETONES UR QL STRIP: NEGATIVE
LEUKOCYTE ESTERASE UR QL STRIP.AUTO: ABNORMAL
MICRO TOTAL PROTEIN: 3.9 MG/DL
NITRITE UR QL STRIP: NEGATIVE
PH UR STRIP.AUTO: <=5 [PH] (ref 5–8)
PHOSPHATE SERPL-MCNC: 3.3 MG/DL (ref 2.7–4.5)
POTASSIUM BLD-SCNC: 4.2 MMOL/L (ref 3.5–5.3)
PROT UR QL STRIP: NEGATIVE
RBC # UR: ABNORMAL /HPF
REF LAB TEST METHOD: ABNORMAL
SODIUM BLD-SCNC: 138 MMOL/L (ref 135–153)
SP GR UR STRIP: 1.01 (ref 1–1.03)
SQUAMOUS #/AREA URNS HPF: ABNORMAL /HPF
UROBILINOGEN UR QL STRIP: ABNORMAL
WBC UR QL AUTO: ABNORMAL /HPF

## 2018-01-29 PROCEDURE — 81001 URINALYSIS AUTO W/SCOPE: CPT | Performed by: INTERNAL MEDICINE

## 2018-01-29 PROCEDURE — 84156 ASSAY OF PROTEIN URINE: CPT | Performed by: INTERNAL MEDICINE

## 2018-01-29 PROCEDURE — 36415 COLL VENOUS BLD VENIPUNCTURE: CPT | Performed by: INTERNAL MEDICINE

## 2018-01-29 PROCEDURE — 80069 RENAL FUNCTION PANEL: CPT | Performed by: INTERNAL MEDICINE

## 2018-01-29 PROCEDURE — 82570 ASSAY OF URINE CREATININE: CPT | Performed by: INTERNAL MEDICINE

## 2018-01-30 LAB — CREAT UR-MCNC: 62.6 MG/DL

## 2018-10-04 ENCOUNTER — HOSPITAL ENCOUNTER (OUTPATIENT)
Dept: GENERAL RADIOLOGY | Facility: HOSPITAL | Age: 33
Discharge: HOME | End: 2018-10-04
Attending: UROLOGY | Admitting: UROLOGY

## 2018-10-04 ENCOUNTER — OFFICE VISIT (OUTPATIENT)
Dept: UROLOGY | Facility: CLINIC | Age: 33
End: 2018-10-04

## 2018-10-04 VITALS — WEIGHT: 240 LBS | BODY MASS INDEX: 36.37 KG/M2 | HEIGHT: 68 IN

## 2018-10-04 DIAGNOSIS — N20.0 KIDNEY STONE: Primary | ICD-10-CM

## 2018-10-04 DIAGNOSIS — N20.0 RENAL STONES: ICD-10-CM

## 2018-10-04 PROCEDURE — 99213 OFFICE O/P EST LOW 20 MIN: CPT | Performed by: UROLOGY

## 2018-10-04 PROCEDURE — 74018 RADEX ABDOMEN 1 VIEW: CPT

## 2018-10-04 PROCEDURE — 74018 RADEX ABDOMEN 1 VIEW: CPT | Performed by: RADIOLOGY

## 2018-10-04 NOTE — PROGRESS NOTES
Chief Complaint:          Stones    HPI:   33 y.o. female.  Pt has not had any stone pain or infections this past year.  HPI      Past Medical History:        Past Medical History:   Diagnosis Date   • Arthritis    • Blood clots in brain     2003  ALSO ONE IN LEG IN 2003.   • Dental bridge present    • Guild filter in place     SINCE 2003   • History of transfusion    • History of unsteady gait     SINCE STROKE   • Hypertension    • Kidney stone    • Lupus    • Migraine    • Seizures (CMS/HCC)     LAST ONE 2003   • Stroke (CMS/HCC)     2003         Current Meds:     Current Outpatient Prescriptions   Medication Sig Dispense Refill   • APTIOM 800 MG tablet Take 800 mg by mouth 1 (One) Time.     • DULoxetine (CYMBALTA) 30 MG capsule Take 30 mg by mouth Daily.     • hydroxychloroquine (PLAQUENIL) 200 MG tablet Take 200 mg by mouth Daily.     • nadolol (CORGARD) 40 MG tablet Take 20 mg by mouth Daily.     • nortriptyline (PAMELOR) 10 MG capsule Take 20 mg by mouth Every Night.     • oxyCODONE-acetaminophen (PERCOCET) 5-325 MG per tablet 1 to 2 Tablets Every 6 Hours as needed for PAIN 40 tablet 0   • promethazine (PHENERGAN) 25 MG tablet Take 25 mg by mouth Every 8 (Eight) Hours As Needed.     • raNITIdine (ZANTAC) 150 MG tablet Take 150 mg by mouth Every Night.     • sulfamethoxazole-trimethoprim (BACTRIM DS,SEPTRA DS) 800-160 MG per tablet Take 1 tablet by mouth 2 (Two) Times a Day. 20 tablet 0   • SUMAtriptan (IMITREX) 100 MG tablet Take 100 mg by mouth 1 (One) Time As Needed.     • tiZANidine (ZANAFLEX) 2 MG tablet Take 2 mg by mouth 3 (Three) Times a Day As Needed (TAKES 2 AT BED TIME).     • traMADol (ULTRAM) 50 MG tablet Take  by mouth 3 (Three) Times a Day As Needed.       No current facility-administered medications for this visit.         Allergies:      Allergies   Allergen Reactions   • Dilantin [Phenytoin]    • Phenytoin Sodium Extended         Past Surgical History:     Past Surgical History:    Procedure Laterality Date   • ABDOMINAL SURGERY     • EXTRACORPOREAL SHOCK WAVE LITHOTRIPSY (ESWL) Left 8/18/2017    Procedure: EXTRACORPOREAL SHOCKWAVE LITHOTRIPSY, FLEXIBLE URETEROSCOPY;  Surgeon: Driss Wharton MD;  Location: [facility];  Service:    • GALLBLADDER SURGERY     • KIDNEY STONE SURGERY           Social History:     Social History     Social History   • Marital status: Single     Spouse name: N/A   • Number of children: N/A   • Years of education: N/A     Occupational History   • Not on file.     Social History Main Topics   • Smoking status: Never Smoker   • Smokeless tobacco: Never Used   • Alcohol use No   • Drug use: No   • Sexual activity: Defer     Other Topics Concern   • Not on file     Social History Narrative   • No narrative on file       Family History:     Family History   Problem Relation Age of Onset   • Diabetes Father        Review of Systems:     Review of Systems   Constitutional: Negative for chills, fatigue and fever.   Respiratory: Negative for cough, shortness of breath and wheezing.    Cardiovascular: Negative for leg swelling.   Gastrointestinal: Positive for nausea. Negative for abdominal pain and vomiting.   Musculoskeletal: Positive for back pain. Negative for joint swelling.   Neurological: Positive for headaches. Negative for dizziness.   Psychiatric/Behavioral: Negative for confusion.       IPSS Questionnaire (AUA-7):  Over the past month…    1)  Incomplete Emptying  How often have you had a sensation of not emptying your bladder?  1 - Less than 1 time in 5   2)  Frequency  How often have you had to urinate less than every two hours? 1 - Less than 1 time in 5   3)  Intermittency  How often have you found you stopped and started again several times when you urinated?  0 - Not at all   4) Urgency  How often have you found it difficult to postpone urination?  5 - Almost always   5) Weak Stream  How often have you had a weak urinary stream?  0 - Not at all   6)  Straining  How often have you had to push or strain to begin urination?  0 - Not at all   7) Nocturia  How many times did you typically get up at night to urinate?  1 - 1 time   Total Score:  8       Quality of life due to urinary symptoms:  If you were to spend the rest of your life with your urinary condition the way it is now, how would you feel about that? 3-Mixed   Urine Leakage (Incontinence) 0-No Leakage       I have reviewed the follow portions of the patient's history and confirmed they are accurate today:  allergies, current medications, past family history, past medical history, past social history, past surgical history, problem list and ROS  Physical Exam:     Physical Exam   Constitutional: She is oriented to person, place, and time. She appears well-developed.   Morbid obesity   HENT:   Head: Normocephalic.   Right Ear: External ear normal.   Left Ear: External ear normal.   Nose: Nose normal.   Mouth/Throat: Oropharynx is clear and moist.   Eyes: Pupils are equal, round, and reactive to light. Conjunctivae and EOM are normal.   Neck: Normal range of motion. Neck supple. No tracheal deviation present. No thyromegaly present.   Cardiovascular: Normal heart sounds.  Exam reveals no gallop and no friction rub.    No murmur heard.  Pulmonary/Chest: Effort normal and breath sounds normal. No respiratory distress. She has no wheezes. She has no rales. She exhibits no tenderness.   Abdominal: Soft. Bowel sounds are normal. She exhibits no distension and no mass. There is no tenderness. There is no rebound and no guarding. No hernia.   Musculoskeletal: Normal range of motion. She exhibits no edema.   Lymphadenopathy:     She has no cervical adenopathy.   Neurological: She is alert and oriented to person, place, and time. She displays normal reflexes. No cranial nerve deficit. She exhibits normal muscle tone. Coordination normal.   Skin: Skin is warm. No rash noted.   Psychiatric: She has a normal mood and  "affect. Judgment normal.       Ht 172.7 cm (68\")   Wt 109 kg (240 lb)   BMI 36.49 kg/m²    Procedure:         Assessment:     Encounter Diagnosis   Name Primary?   • Kidney stone Yes     No orders of the defined types were placed in this encounter.      Plan:   I discussed stones and obesity.  Will see pt in a year.    Patient's Body mass index is 36.49 kg/m². BMI is above normal parameters. Recommendations include: educational material.      Counseling was given to patient for the following topics diagnostic results including: kub. The interim medical history and current results were reviewed.  A treatment plan with follow-up was made for Kidney stone [N20.0].  This document has been electronically signed by Driss Wharton MD October 4, 2018 3:28 PM  "

## 2019-02-01 ENCOUNTER — LAB (OUTPATIENT)
Dept: LAB | Facility: HOSPITAL | Age: 34
End: 2019-02-01

## 2019-02-01 ENCOUNTER — TRANSCRIBE ORDERS (OUTPATIENT)
Dept: ADMINISTRATIVE | Facility: HOSPITAL | Age: 34
End: 2019-02-01

## 2019-02-01 DIAGNOSIS — N20.0 URIC ACID NEPHROLITHIASIS: Primary | ICD-10-CM

## 2019-02-01 DIAGNOSIS — N20.0 URIC ACID NEPHROLITHIASIS: ICD-10-CM

## 2019-02-01 LAB
ALBUMIN SERPL-MCNC: 4.6 G/DL (ref 3.5–5)
ANION GAP SERPL CALCULATED.3IONS-SCNC: 6.9 MMOL/L (ref 3.6–11.2)
BACTERIA UR QL AUTO: ABNORMAL /HPF
BILIRUB UR QL STRIP: NEGATIVE
BUN BLD-MCNC: 14 MG/DL (ref 7–21)
BUN/CREAT SERPL: 19.7 (ref 7–25)
CALCIUM SPEC-SCNC: 9.1 MG/DL (ref 7.7–10)
CHLORIDE SERPL-SCNC: 105 MMOL/L (ref 99–112)
CLARITY UR: CLEAR
CO2 SERPL-SCNC: 26.1 MMOL/L (ref 24.3–31.9)
COLOR UR: YELLOW
CREAT BLD-MCNC: 0.71 MG/DL (ref 0.43–1.29)
CREAT UR-MCNC: 142.4 MG/DL
GFR SERPL CREATININE-BSD FRML MDRD: 94 ML/MIN/1.73
GLUCOSE BLD-MCNC: 79 MG/DL (ref 70–110)
GLUCOSE UR STRIP-MCNC: NEGATIVE MG/DL
HGB UR QL STRIP.AUTO: ABNORMAL
HYALINE CASTS UR QL AUTO: ABNORMAL /LPF
KETONES UR QL STRIP: NEGATIVE
LEUKOCYTE ESTERASE UR QL STRIP.AUTO: NEGATIVE
MICRO TOTAL PROTEIN: 23 MG/DL
NITRITE UR QL STRIP: NEGATIVE
PH UR STRIP.AUTO: <=5 [PH] (ref 5–8)
PHOSPHATE SERPL-MCNC: 4.6 MG/DL (ref 2.7–4.5)
POTASSIUM BLD-SCNC: 3.7 MMOL/L (ref 3.5–5.3)
PROT UR QL STRIP: NEGATIVE
RBC # UR: ABNORMAL /HPF
REF LAB TEST METHOD: ABNORMAL
SODIUM BLD-SCNC: 138 MMOL/L (ref 135–153)
SP GR UR STRIP: >=1.03 (ref 1–1.03)
SQUAMOUS #/AREA URNS HPF: ABNORMAL /HPF
UROBILINOGEN UR QL STRIP: ABNORMAL
WBC UR QL AUTO: ABNORMAL /HPF

## 2019-02-01 PROCEDURE — 81001 URINALYSIS AUTO W/SCOPE: CPT

## 2019-02-01 PROCEDURE — 36415 COLL VENOUS BLD VENIPUNCTURE: CPT

## 2019-02-01 PROCEDURE — 80069 RENAL FUNCTION PANEL: CPT

## 2019-02-01 PROCEDURE — 84156 ASSAY OF PROTEIN URINE: CPT

## 2019-02-01 PROCEDURE — 82570 ASSAY OF URINE CREATININE: CPT

## 2019-02-16 ENCOUNTER — LAB (OUTPATIENT)
Dept: LAB | Facility: HOSPITAL | Age: 34
End: 2019-02-16

## 2019-02-16 ENCOUNTER — TRANSCRIBE ORDERS (OUTPATIENT)
Dept: ADMINISTRATIVE | Facility: HOSPITAL | Age: 34
End: 2019-02-16

## 2019-02-16 DIAGNOSIS — R56.9 PARTIAL SEIZURES (HCC): ICD-10-CM

## 2019-02-16 DIAGNOSIS — R56.9 PARTIAL SEIZURES (HCC): Primary | ICD-10-CM

## 2019-02-16 LAB
ALBUMIN SERPL-MCNC: 4.8 G/DL (ref 3.5–5)
ALBUMIN/GLOB SERPL: 1.5 G/DL (ref 1.5–2.5)
ALP SERPL-CCNC: 73 U/L (ref 35–104)
ALT SERPL W P-5'-P-CCNC: 16 U/L (ref 10–36)
ANION GAP SERPL CALCULATED.3IONS-SCNC: 4.4 MMOL/L (ref 3.6–11.2)
AST SERPL-CCNC: 20 U/L (ref 10–30)
BASOPHILS # BLD AUTO: 0.01 10*3/MM3 (ref 0–0.3)
BASOPHILS NFR BLD AUTO: 0.2 % (ref 0–2)
BILIRUB SERPL-MCNC: 0.4 MG/DL (ref 0.2–1.8)
BUN BLD-MCNC: 13 MG/DL (ref 7–21)
BUN/CREAT SERPL: 18.6 (ref 7–25)
CALCIUM SPEC-SCNC: 9.4 MG/DL (ref 7.7–10)
CHLORIDE SERPL-SCNC: 109 MMOL/L (ref 99–112)
CO2 SERPL-SCNC: 24.6 MMOL/L (ref 24.3–31.9)
CREAT BLD-MCNC: 0.7 MG/DL (ref 0.43–1.29)
DEPRECATED RDW RBC AUTO: 44 FL (ref 37–54)
EOSINOPHIL # BLD AUTO: 0.1 10*3/MM3 (ref 0–0.7)
EOSINOPHIL NFR BLD AUTO: 1.7 % (ref 0–5)
ERYTHROCYTE [DISTWIDTH] IN BLOOD BY AUTOMATED COUNT: 14.1 % (ref 11.5–14.5)
GFR SERPL CREATININE-BSD FRML MDRD: 96 ML/MIN/1.73
GLOBULIN UR ELPH-MCNC: 3.2 GM/DL
GLUCOSE BLD-MCNC: 93 MG/DL (ref 70–110)
HCT VFR BLD AUTO: 44.2 % (ref 37–47)
HGB BLD-MCNC: 13.9 G/DL (ref 12–16)
IMM GRANULOCYTES # BLD AUTO: 0 10*3/MM3 (ref 0–0.03)
IMM GRANULOCYTES NFR BLD AUTO: 0 % (ref 0–0.5)
LYMPHOCYTES # BLD AUTO: 2.25 10*3/MM3 (ref 1–3)
LYMPHOCYTES NFR BLD AUTO: 38 % (ref 21–51)
MCH RBC QN AUTO: 26.9 PG (ref 27–33)
MCHC RBC AUTO-ENTMCNC: 31.4 G/DL (ref 33–37)
MCV RBC AUTO: 85.5 FL (ref 80–94)
MONOCYTES # BLD AUTO: 0.42 10*3/MM3 (ref 0.1–0.9)
MONOCYTES NFR BLD AUTO: 7.1 % (ref 0–10)
NEUTROPHILS # BLD AUTO: 3.14 10*3/MM3 (ref 1.4–6.5)
NEUTROPHILS NFR BLD AUTO: 53 % (ref 30–70)
OSMOLALITY SERPL CALC.SUM OF ELEC: 275.5 MOSM/KG (ref 273–305)
PLATELET # BLD AUTO: 223 10*3/MM3 (ref 130–400)
PMV BLD AUTO: 10.5 FL (ref 6–10)
POTASSIUM BLD-SCNC: 4.3 MMOL/L (ref 3.5–5.3)
PROT SERPL-MCNC: 8 G/DL (ref 6–8)
RBC # BLD AUTO: 5.17 10*6/MM3 (ref 4.2–5.4)
SODIUM BLD-SCNC: 138 MMOL/L (ref 135–153)
WBC NRBC COR # BLD: 5.92 10*3/MM3 (ref 4.5–12.5)

## 2019-02-16 PROCEDURE — 85025 COMPLETE CBC W/AUTO DIFF WBC: CPT

## 2019-02-16 PROCEDURE — 80053 COMPREHEN METABOLIC PANEL: CPT

## 2019-02-16 PROCEDURE — 36415 COLL VENOUS BLD VENIPUNCTURE: CPT

## 2019-05-10 ENCOUNTER — OFFICE VISIT (OUTPATIENT)
Dept: FAMILY MEDICINE CLINIC | Facility: CLINIC | Age: 34
End: 2019-05-10

## 2019-05-10 VITALS
HEIGHT: 68 IN | WEIGHT: 249.2 LBS | SYSTOLIC BLOOD PRESSURE: 128 MMHG | TEMPERATURE: 97.8 F | BODY MASS INDEX: 37.77 KG/M2 | HEART RATE: 84 BPM | OXYGEN SATURATION: 97 % | DIASTOLIC BLOOD PRESSURE: 84 MMHG

## 2019-05-10 DIAGNOSIS — R07.9 CHEST PAIN, UNSPECIFIED TYPE: ICD-10-CM

## 2019-05-10 DIAGNOSIS — H65.191 OTHER ACUTE NONSUPPURATIVE OTITIS MEDIA OF RIGHT EAR, RECURRENCE NOT SPECIFIED: Primary | ICD-10-CM

## 2019-05-10 DIAGNOSIS — R94.31 ABNORMAL EKG: ICD-10-CM

## 2019-05-10 PROBLEM — G43.909 MIGRAINE: Status: ACTIVE | Noted: 2019-05-10

## 2019-05-10 PROBLEM — I10 HYPERTENSION: Status: ACTIVE | Noted: 2019-05-10

## 2019-05-10 PROBLEM — R56.9 SEIZURES: Status: ACTIVE | Noted: 2019-05-10

## 2019-05-10 PROBLEM — M32.9 DISSEMINATED LUPUS ERYTHEMATOSUS (HCC): Status: ACTIVE | Noted: 2019-05-10

## 2019-05-10 PROBLEM — N20.0 RENAL STONE: Status: RESOLVED | Noted: 2017-08-14 | Resolved: 2019-05-10

## 2019-05-10 PROBLEM — M32.9 LUPUS (HCC): Status: ACTIVE | Noted: 2019-05-10

## 2019-05-10 PROBLEM — IMO0002 LUPUS: Status: ACTIVE | Noted: 2019-05-10

## 2019-05-10 PROCEDURE — 93005 ELECTROCARDIOGRAM TRACING: CPT | Performed by: NURSE PRACTITIONER

## 2019-05-10 PROCEDURE — 99214 OFFICE O/P EST MOD 30 MIN: CPT | Performed by: NURSE PRACTITIONER

## 2019-05-10 RX ORDER — DULOXETIN HYDROCHLORIDE 60 MG/1
CAPSULE, DELAYED RELEASE ORAL
COMMUNITY
Start: 2019-02-16 | End: 2019-05-10

## 2019-05-10 RX ORDER — AMOXICILLIN AND CLAVULANATE POTASSIUM 875; 125 MG/1; MG/1
1 TABLET, FILM COATED ORAL 2 TIMES DAILY
Qty: 20 TABLET | Refills: 0 | Status: SHIPPED | OUTPATIENT
Start: 2019-05-10 | End: 2019-05-20

## 2019-05-10 RX ORDER — CHOLECALCIFEROL (VITAMIN D3) 125 MCG
5 CAPSULE ORAL NIGHTLY
COMMUNITY

## 2019-05-10 RX ORDER — DULOXETIN HYDROCHLORIDE 60 MG/1
60 CAPSULE, DELAYED RELEASE ORAL DAILY
Qty: 30 CAPSULE | Refills: 0
Start: 2019-05-10

## 2019-05-10 RX ORDER — RANITIDINE 150 MG/1
1 TABLET ORAL EVERY 12 HOURS
COMMUNITY
Start: 2017-09-22 | End: 2021-04-12

## 2019-05-10 RX ORDER — PANTOPRAZOLE SODIUM 40 MG/1
1 TABLET, DELAYED RELEASE ORAL DAILY
COMMUNITY
Start: 2015-04-07 | End: 2021-04-12

## 2019-05-10 NOTE — PATIENT INSTRUCTIONS
Obesity, Adult  Obesity is having too much body fat. If you have a BMI of 30 or more, you are obese. BMI is a number that explains how much body fat you have. Obesity is often caused by taking in (consuming) more calories than your body uses.  Obesity can cause serious health problems. Changing your lifestyle can help to treat obesity.  Follow these instructions at home:  Eating and drinking    · Follow advice from your doctor about what to eat and drink. Your doctor may tell you to:  ? Cut down on (limit) fast foods, sweets, and processed snack foods.  ? Choose low-fat options. For example, choose low-fat milk instead of whole milk.  ? Eat 5 or more servings of fruits or vegetables every day.  ? Eat at home more often. This gives you more control over what you eat.  ? Choose healthy foods when you eat out.  ? Learn what a healthy portion size is. A portion size is the amount of a certain food that is healthy for you to eat at one time. This is different for each person.  ? Keep low-fat snacks available.  ? Avoid sugary drinks. These include soda, fruit juice, iced tea that is sweetened with sugar, and flavored milk.  ? Eat a healthy breakfast.  · Drink enough water to keep your pee (urine) clear or pale yellow.  · Do not go without eating for long periods of time (do not fast).  · Do not go on popular or trendy diets (fad diets).  Physical Activity  · Exercise often, as told by your doctor. Ask your doctor:  ? What types of exercise are safe for you.  ? How often you should exercise.  · Warm up and stretch before being active.  · Do slow stretching after being active (cool down).  · Rest between times of being active.  Lifestyle  · Limit how much time you spend in front of your TV, computer, or video game system (be less sedentary).  · Find ways to reward yourself that do not involve food.  · Limit alcohol intake to no more than 1 drink a day for nonpregnant women and 2 drinks a day for men. One drink equals 12 oz  of beer, 5 oz of wine, or 1½ oz of hard liquor.  General instructions  · Keep a weight loss journal. This can help you keep track of:  ? The food that you eat.  ? The exercise that you do.  · Take over-the-counter and prescription medicines only as told by your doctor.  · Take vitamins and supplements only as told by your doctor.  · Think about joining a support group. Your doctor may be able to help with this.  · Keep all follow-up visits as told by your doctor. This is important.  Contact a doctor if:  · You cannot meet your weight loss goal after you have changed your diet and lifestyle for 6 weeks.  This information is not intended to replace advice given to you by your health care provider. Make sure you discuss any questions you have with your health care provider.  Document Released: 03/11/2013 Document Revised: 05/25/2017 Document Reviewed: 10/05/2016  Elsevier Interactive Patient Education © 2019 [employer]

## 2019-05-24 ENCOUNTER — OFFICE VISIT (OUTPATIENT)
Dept: FAMILY MEDICINE CLINIC | Facility: CLINIC | Age: 34
End: 2019-05-24

## 2019-05-24 VITALS
TEMPERATURE: 97 F | OXYGEN SATURATION: 98 % | HEIGHT: 68 IN | DIASTOLIC BLOOD PRESSURE: 78 MMHG | HEART RATE: 104 BPM | WEIGHT: 247 LBS | BODY MASS INDEX: 37.44 KG/M2 | SYSTOLIC BLOOD PRESSURE: 130 MMHG

## 2019-05-24 DIAGNOSIS — J30.2 SEASONAL ALLERGIC RHINITIS, UNSPECIFIED TRIGGER: ICD-10-CM

## 2019-05-24 DIAGNOSIS — H65.92 LEFT NON-SUPPURATIVE OTITIS MEDIA: Primary | ICD-10-CM

## 2019-05-24 PROCEDURE — 99213 OFFICE O/P EST LOW 20 MIN: CPT | Performed by: NURSE PRACTITIONER

## 2019-05-24 RX ORDER — FLUTICASONE PROPIONATE 50 MCG
2 SPRAY, SUSPENSION (ML) NASAL DAILY
Qty: 15.8 ML | Refills: 2 | Status: SHIPPED | OUTPATIENT
Start: 2019-05-24 | End: 2020-06-01 | Stop reason: SDUPTHER

## 2019-05-24 RX ORDER — FEXOFENADINE HCL 180 MG/1
180 TABLET ORAL DAILY
Qty: 30 TABLET | Refills: 5 | Status: SHIPPED | OUTPATIENT
Start: 2019-05-24 | End: 2020-06-01 | Stop reason: SDUPTHER

## 2019-05-24 NOTE — PROGRESS NOTES
Milind Benítez is a 34 y.o. female.     Chief Complaint   Patient presents with   • other acute nonsuppurative otitis media of right ear       She presents for follow up of otitis media. She states she got the Augmentin and took all of it. She states she doesn't think it helped. She states her ears get stopped up and it feels like she has water in her ears. She states it feels like she is in a barrel. She also c/o runny nose and sore itchy throat. She states sometimes her nasal drainage is bright yellow and sometimes its clear. She states it sometimes has blood in it. She denies fever or chills. She states she has been taking something like pseudophed over the counter but it is not helping. She states she has also been taking benadryl that is not helping either.              The following portions of the patient's history were reviewed and updated as appropriate: allergies, current medications, past family history, past medical history, past social history, past surgical history and problem list.    Review of Systems   Constitutional: Negative for fatigue, fever and unexpected weight change.   HENT: Positive for ear pain, rhinorrhea, sneezing and sore throat.    Eyes: Positive for itching. Negative for visual disturbance.   Respiratory: Positive for cough. Negative for chest tightness, shortness of breath and wheezing.    Cardiovascular: Positive for chest pain. Negative for palpitations and leg swelling.   Gastrointestinal: Negative for abdominal pain, blood in stool, constipation, diarrhea, nausea and vomiting.   Endocrine: Positive for heat intolerance. Negative for cold intolerance.   Genitourinary: Negative for dysuria and hematuria.   Musculoskeletal: Positive for arthralgias. Negative for myalgias.   Skin: Negative for color change.   Allergic/Immunologic: Positive for environmental allergies.   Neurological: Negative for dizziness, seizures, syncope, light-headedness and headaches.  "  Hematological: Negative for adenopathy.   Psychiatric/Behavioral: Negative for suicidal ideas. The patient is not nervous/anxious.        Objective     /78 (BP Location: Right arm, Patient Position: Sitting, Cuff Size: Adult)   Pulse 104   Temp 97 °F (36.1 °C) (Oral)   Ht 172.7 cm (67.99\")   Wt 112 kg (247 lb)   SpO2 98%   BMI 37.57 kg/m²     Physical Exam   Constitutional: She is oriented to person, place, and time. Vital signs are normal. She appears well-developed and well-nourished. No distress.   HENT:   Head: Normocephalic and atraumatic.   Right Ear: Hearing, external ear and ear canal normal. No drainage, swelling or tenderness. Tympanic membrane is bulging. Tympanic membrane is not erythematous. No decreased hearing is noted.   Left Ear: Hearing, external ear and ear canal normal. Tympanic membrane is bulging. Tympanic membrane is not erythematous.   Nose: Nose normal. Right sinus exhibits no maxillary sinus tenderness and no frontal sinus tenderness. Left sinus exhibits no maxillary sinus tenderness and no frontal sinus tenderness.   Mouth/Throat: Uvula is midline, oropharynx is clear and moist and mucous membranes are normal.   Eyes: Conjunctivae and lids are normal. Pupils are equal, round, and reactive to light.   Neck: Trachea normal. Neck supple. No tracheal tenderness present. No tracheal deviation present. No thyromegaly present.   Cardiovascular: Normal rate, regular rhythm, S1 normal, S2 normal, normal heart sounds, intact distal pulses and normal pulses. Exam reveals no gallop and no friction rub.   No murmur heard.  Pulmonary/Chest: Effort normal and breath sounds normal. No respiratory distress.   Abdominal: Soft. Bowel sounds are normal. She exhibits no distension. There is no tenderness.   Musculoskeletal: She exhibits no edema.   Neurological: She is alert and oriented to person, place, and time.   Skin: Skin is warm and dry. She is not diaphoretic.   Psychiatric: She has a " normal mood and affect. Her behavior is normal. Judgment and thought content normal.       Assessment/Plan     Problem List Items Addressed This Visit     None      Visit Diagnoses     Left non-suppurative otitis media    -  Primary    Seasonal allergic rhinitis, unspecified trigger        Relevant Medications    fexofenadine (ALLEGRA ALLERGY) 180 MG tablet    fluticasone (FLONASE) 50 MCG/ACT nasal spray        Plan: Allegra and flonase ordered for allergies. Plan referral for allergy testing if no improvement.       Patient's Body mass index is 37.57 kg/m². BMI is above normal parameters. Recommendations include: educational material.   (Normal BMI:  18.5-24.9, OW 25-29.9, Obesity 30 or greater)          Understands disease processes and need for medications.  Understands reasons for urgent and emergent care.  Patient (& family) verbalized agreement for treatment plan.   Emotional support and active listening provided.  Patient provided time to verbalize feelings.               This document has been electronically signed by DANIEL Restrepo   May 24, 2019 11:21 AM

## 2019-05-28 ENCOUNTER — OFFICE VISIT (OUTPATIENT)
Dept: CARDIOLOGY | Facility: CLINIC | Age: 34
End: 2019-05-28

## 2019-05-28 VITALS
WEIGHT: 245 LBS | OXYGEN SATURATION: 98 % | BODY MASS INDEX: 37.13 KG/M2 | SYSTOLIC BLOOD PRESSURE: 136 MMHG | HEART RATE: 73 BPM | DIASTOLIC BLOOD PRESSURE: 74 MMHG | HEIGHT: 68 IN

## 2019-05-28 DIAGNOSIS — E66.9 OBESITY (BMI 35.0-39.9 WITHOUT COMORBIDITY): ICD-10-CM

## 2019-05-28 DIAGNOSIS — R07.9 CHEST PAIN IN ADULT: ICD-10-CM

## 2019-05-28 DIAGNOSIS — I10 ESSENTIAL HYPERTENSION: Primary | ICD-10-CM

## 2019-05-28 PROCEDURE — 99204 OFFICE O/P NEW MOD 45 MIN: CPT | Performed by: INTERNAL MEDICINE

## 2019-05-28 PROCEDURE — 93000 ELECTROCARDIOGRAM COMPLETE: CPT | Performed by: INTERNAL MEDICINE

## 2019-05-28 NOTE — PROGRESS NOTES
Subjective   Chief Complaint   Patient presents with   • Chest Pain       History of Present Illness  Patient is 34 years old white female who is very complicated medical history.  She states that she has disseminated lupus erythematosus and is following with a rheumatologist in [place].    She also has seizure disorder.  Seizures were difficult to control.  Patient was managed in induced coma for about 3 weeks.  Patient has no memory of that event.  She thinks that she may have had clot..  She is following with neurology Dr. Cornelius in [place].  Migraines are controlled with medication.    She is here for cardiac evaluation because of chest pressure tightness heaviness and abnormal EKG.  Chest pressure is located behind the left breast.  It is described as tight heavy feeling 4 out of 10.  No radiation of the pain no nausea or vomiting.  Pain is not particularly related to exertion.  She denies any prior history of cardiac problems no rheumatic fever heart murmur.    Patient is not sure about her lipid status    Past Surgical History:   Procedure Laterality Date   • ABDOMINAL SURGERY     • EXTRACORPOREAL SHOCK WAVE LITHOTRIPSY (ESWL) Left 8/18/2017    Procedure: EXTRACORPOREAL SHOCKWAVE LITHOTRIPSY, FLEXIBLE URETEROSCOPY;  Surgeon: Driss Wharton MD;  Location: [facility];  Service:    • GALLBLADDER SURGERY     • KIDNEY STONE SURGERY       Family History   Problem Relation Age of Onset   • Diabetes Father    • Thyroid disease Mother      Past Medical History:   Diagnosis Date   • Arthritis    • Blood clots in brain     2003  ALSO ONE IN LEG IN 2003.   • Dental bridge present    • Reynolds filter in place     SINCE 2003   • History of transfusion    • History of unsteady gait     SINCE STROKE   • Hypertension    • Kidney stone    • Lupus    • Migraine    • Seizures (CMS/HCC)     LAST ONE 2003   • Stroke (CMS/HCC)     2003       Patient Active Problem List   Diagnosis   • Hypertension   • Migraine   •  Seizures (CMS/HCC)   • Lupus   • Disseminated lupus erythematosus (CMS/HCC)   • Chest pain in adult   • Obesity (BMI 35.0-39.9 without comorbidity)         Social History     Tobacco Use   • Smoking status: Never Smoker   • Smokeless tobacco: Never Used   Substance Use Topics   • Alcohol use: No   • Drug use: No         The following portions of the patient's history were reviewed and updated as appropriate: allergies, current medications, past family history, past medical history, past social history, past surgical history and problem list.    Allergies   Allergen Reactions   • Dilantin [Phenytoin]    • Phenytoin Sodium Extended          Current Outpatient Medications:   •  APTIOM 800 MG tablet, Take 800 mg by mouth 1 (One) Time., Disp: , Rfl:   •  DULoxetine (CYMBALTA) 60 MG capsule, Take 1 capsule by mouth Daily., Disp: 30 capsule, Rfl: 0  •  fexofenadine (ALLEGRA ALLERGY) 180 MG tablet, Take 1 tablet by mouth Daily., Disp: 30 tablet, Rfl: 5  •  fluticasone (FLONASE) 50 MCG/ACT nasal spray, 2 sprays into the nostril(s) as directed by provider Daily., Disp: 15.8 mL, Rfl: 2  •  hydroxychloroquine (PLAQUENIL) 200 MG tablet, Take 200 mg by mouth Daily., Disp: , Rfl:   •  melatonin 5 MG tablet tablet, Take 5 mg by mouth., Disp: , Rfl:   •  nadolol (CORGARD) 40 MG tablet, Take 20 mg by mouth Daily., Disp: , Rfl:   •  nortriptyline (PAMELOR) 10 MG capsule, Take 20 mg by mouth Every Night., Disp: , Rfl:   •  pantoprazole (PROTONIX) 40 MG EC tablet, Take 1 tablet by mouth Daily., Disp: , Rfl:   •  promethazine (PHENERGAN) 25 MG tablet, Take 25 mg by mouth Every 8 (Eight) Hours As Needed., Disp: , Rfl:   •  raNITIdine (ZANTAC) 150 MG tablet, Take 1 tablet by mouth Every 12 (Twelve) Hours., Disp: , Rfl:   •  SUMAtriptan (IMITREX) 100 MG tablet, Take 100 mg by mouth 1 (One) Time As Needed., Disp: , Rfl:   •  tiZANidine (ZANAFLEX) 2 MG tablet, Take 2 mg by mouth 3 (Three) Times a Day As Needed (TAKES 2 AT BED TIME)., Disp: ,  "Rfl:   •  traMADol (ULTRAM) 50 MG tablet, Take  by mouth 3 (Three) Times a Day As Needed., Disp: , Rfl:     Review of Systems   Constitution: Negative.   HENT: Negative.  Negative for congestion.    Eyes: Negative.    Cardiovascular: Positive for chest pain. Negative for cyanosis, dyspnea on exertion, irregular heartbeat, leg swelling, near-syncope, orthopnea, palpitations, paroxysmal nocturnal dyspnea and syncope.   Respiratory: Negative.  Negative for shortness of breath.    Hematologic/Lymphatic: Negative.    Musculoskeletal: Negative.    Gastrointestinal: Negative.    Neurological: Negative.  Negative for headaches.        Objective      /74 (BP Location: Left arm, Patient Position: Sitting)   Pulse 73   Ht 172.5 cm (67.9\")   Wt 111 kg (245 lb)   SpO2 98%   BMI 37.36 kg/m²     Physical Exam   Constitutional: She appears well-developed and well-nourished. No distress.   HENT:   Head: Normocephalic and atraumatic.   Mouth/Throat: Oropharynx is clear and moist. No oropharyngeal exudate.   Eyes: Conjunctivae and EOM are normal. Pupils are equal, round, and reactive to light. No scleral icterus.   Neck: Normal range of motion. Neck supple. No JVD present. No tracheal deviation present. No thyromegaly present.   Cardiovascular: Normal rate, regular rhythm, normal heart sounds and intact distal pulses. PMI is not displaced. Exam reveals no gallop, no friction rub and no decreased pulses.   No murmur heard.  Pulses:       Carotid pulses are 3+ on the right side, and 3+ on the left side.       Radial pulses are 3+ on the right side, and 3+ on the left side.   Pulmonary/Chest: Effort normal and breath sounds normal. No respiratory distress. She has no wheezes. She has no rales. She exhibits no tenderness.   Abdominal: Soft. Bowel sounds are normal. She exhibits no distension, no abdominal bruit and no mass. There is no splenomegaly or hepatomegaly. There is no tenderness. There is no rebound and no guarding. "   Musculoskeletal: Normal range of motion. She exhibits no edema, tenderness or deformity.   Lymphadenopathy:     She has no cervical adenopathy.   Neurological: She is alert. She has normal reflexes. No cranial nerve deficit. She exhibits normal muscle tone. Coordination normal.   Skin: Skin is warm and dry. No rash noted. She is not diaphoretic. No erythema.   Psychiatric: She has a normal mood and affect. Her behavior is normal. Judgment and thought content normal.       Lab Review:    Lab Results   Component Value Date     02/16/2019    K 4.3 02/16/2019     02/16/2019    BUN 13 02/16/2019    CREATININE 0.70 02/16/2019    GLUCOSE 93 02/16/2019    CALCIUM 9.4 02/16/2019    ALT 16 02/16/2019    ALKPHOS 73 02/16/2019    LABIL2 1.5 05/28/2016     No results found for: CKTOTAL  Lab Results   Component Value Date    WBC 5.92 02/16/2019    HGB 13.9 02/16/2019    HCT 44.2 02/16/2019     02/16/2019     No results found for: INR  Lab Results   Component Value Date    MG 1.8 03/29/2015     No results found for: PSA, CHOL, CHLPL, TRIG, HDL, VLDL, LDLHDL  No results found for: BNP      ECG 12 Lead  Date/Time: 5/28/2019 2:24 PM  Performed by: Deanna Mott MD  Authorized by: Deanna Mott MD   Comparison: compared with previous ECG from 5/10/2019  Similar to previous ECG  Rhythm: sinus rhythm  Rate: normal  BPM: 69  Conduction: conduction normal  ST Segments: ST segments normal  T Waves: T waves normal  QRS axis: normal  Other: no other findings    Clinical impression: normal ECG            I reviewed the patient's new clinical results.  I personally viewed and interpreted the patient's EKG/lab data        Assessment:   Diagnosis Plan   1. Essential hypertension     2. Chest pain in adult  Adult Transthoracic Echo Complete W/ Cont if Necessary Per Protocol    Treadmill Stress Test    Lipid Panel    ECG 12 Lead   3. Obesity (BMI 35.0-39.9 without comorbidity)            Plan:  Patient  complains of chest pain which has typical and atypical features.  She is obese and has unknown lipid status.  She will need lipid panel, echo and stress test which was scheduled.  Healthy lifestyle and weight loss was emphasized.  Follow-up scheduled.  Patient will be reevaluated after the studies have been completed    Thank you for giving me the oppertunity to participate in your patient's cardiac care.    Sincerely,    KINDRA Mott M.D. FACP FAC     No Follow-up on file.

## 2019-06-20 ENCOUNTER — HOSPITAL ENCOUNTER (OUTPATIENT)
Dept: CARDIOLOGY | Facility: HOSPITAL | Age: 34
Discharge: HOME | End: 2019-06-20
Admitting: INTERNAL MEDICINE

## 2019-06-20 ENCOUNTER — HOSPITAL ENCOUNTER (OUTPATIENT)
Dept: CARDIOLOGY | Facility: HOSPITAL | Age: 34
Discharge: HOME | End: 2019-06-20

## 2019-06-20 DIAGNOSIS — R07.9 CHEST PAIN IN ADULT: ICD-10-CM

## 2019-06-20 PROCEDURE — 93306 TTE W/DOPPLER COMPLETE: CPT | Performed by: INTERNAL MEDICINE

## 2019-06-20 PROCEDURE — 93017 CV STRESS TEST TRACING ONLY: CPT

## 2019-06-20 PROCEDURE — 93306 TTE W/DOPPLER COMPLETE: CPT

## 2019-06-20 PROCEDURE — 93018 CV STRESS TEST I&R ONLY: CPT | Performed by: INTERNAL MEDICINE

## 2019-06-20 RX ORDER — ERGOCALCIFEROL 1.25 MG/1
50000 CAPSULE ORAL WEEKLY
Status: ON HOLD | COMMUNITY
End: 2021-11-17

## 2019-06-21 ENCOUNTER — TELEPHONE (OUTPATIENT)
Dept: CARDIOLOGY | Facility: CLINIC | Age: 34
End: 2019-06-21

## 2019-06-21 DIAGNOSIS — R07.9 CHEST PAIN IN ADULT: Primary | ICD-10-CM

## 2019-06-21 LAB
BH CV ECHO MEAS - ACS: 1.8 CM
BH CV ECHO MEAS - AO MAX PG (FULL): 2.1 MMHG
BH CV ECHO MEAS - AO MAX PG: 5.9 MMHG
BH CV ECHO MEAS - AO MEAN PG (FULL): 1 MMHG
BH CV ECHO MEAS - AO MEAN PG: 3 MMHG
BH CV ECHO MEAS - AO ROOT AREA (BSA CORRECTED): 1.4
BH CV ECHO MEAS - AO ROOT AREA: 7.5 CM^2
BH CV ECHO MEAS - AO ROOT DIAM: 3.1 CM
BH CV ECHO MEAS - AO V2 MAX: 121 CM/SEC
BH CV ECHO MEAS - AO V2 MEAN: 74.1 CM/SEC
BH CV ECHO MEAS - AO V2 VTI: 23.4 CM
BH CV ECHO MEAS - BSA(HAYCOCK): 2.3 M^2
BH CV ECHO MEAS - BSA: 2.2 M^2
BH CV ECHO MEAS - BZI_BMI: 38.4 KILOGRAMS/M^2
BH CV ECHO MEAS - BZI_METRIC_HEIGHT: 170.2 CM
BH CV ECHO MEAS - BZI_METRIC_WEIGHT: 111.1 KG
BH CV ECHO MEAS - EDV(CUBED): 112.7 ML
BH CV ECHO MEAS - EDV(MOD-SP2): 47 ML
BH CV ECHO MEAS - EDV(MOD-SP4): 47 ML
BH CV ECHO MEAS - EDV(TEICH): 109.1 ML
BH CV ECHO MEAS - EF(CUBED): 75.1 %
BH CV ECHO MEAS - EF(MOD-SP2): 68.1 %
BH CV ECHO MEAS - EF(MOD-SP4): 72.3 %
BH CV ECHO MEAS - EF(TEICH): 66.9 %
BH CV ECHO MEAS - ESV(CUBED): 28.1 ML
BH CV ECHO MEAS - ESV(MOD-SP2): 15 ML
BH CV ECHO MEAS - ESV(MOD-SP4): 13 ML
BH CV ECHO MEAS - ESV(TEICH): 36.2 ML
BH CV ECHO MEAS - FS: 37.1 %
BH CV ECHO MEAS - IVS/LVPW: 0.87
BH CV ECHO MEAS - IVSD: 0.7 CM
BH CV ECHO MEAS - LA DIMENSION: 2.9 CM
BH CV ECHO MEAS - LA/AO: 0.94
BH CV ECHO MEAS - LV DIASTOLIC VOL/BSA (35-75): 21.3 ML/M^2
BH CV ECHO MEAS - LV IVRT: 0.09 SEC
BH CV ECHO MEAS - LV MASS(C)D: 119 GRAMS
BH CV ECHO MEAS - LV MASS(C)DI: 54 GRAMS/M^2
BH CV ECHO MEAS - LV MAX PG: 3.8 MMHG
BH CV ECHO MEAS - LV MEAN PG: 2 MMHG
BH CV ECHO MEAS - LV SYSTOLIC VOL/BSA (12-30): 5.9 ML/M^2
BH CV ECHO MEAS - LV V1 MAX: 96.9 CM/SEC
BH CV ECHO MEAS - LV V1 MEAN: 66.8 CM/SEC
BH CV ECHO MEAS - LV V1 VTI: 18.2 CM
BH CV ECHO MEAS - LVIDD: 4.8 CM
BH CV ECHO MEAS - LVIDS: 3 CM
BH CV ECHO MEAS - LVLD AP2: 6.5 CM
BH CV ECHO MEAS - LVLD AP4: 7.1 CM
BH CV ECHO MEAS - LVLS AP2: 5.1 CM
BH CV ECHO MEAS - LVLS AP4: 5.2 CM
BH CV ECHO MEAS - LVPWD: 0.81 CM
BH CV ECHO MEAS - MR MAX PG: 20.3 MMHG
BH CV ECHO MEAS - MR MAX VEL: 225 CM/SEC
BH CV ECHO MEAS - MV A MAX VEL: 58.1 CM/SEC
BH CV ECHO MEAS - MV DEC SLOPE: 610 CM/SEC^2
BH CV ECHO MEAS - MV DEC TIME: 0.16 SEC
BH CV ECHO MEAS - MV E MAX VEL: 104 CM/SEC
BH CV ECHO MEAS - MV E/A: 1.8
BH CV ECHO MEAS - MV MAX PG: 4.3 MMHG
BH CV ECHO MEAS - MV MEAN PG: 1 MMHG
BH CV ECHO MEAS - MV P1/2T MAX VEL: 103 CM/SEC
BH CV ECHO MEAS - MV P1/2T: 49.5 MSEC
BH CV ECHO MEAS - MV V2 MAX: 104 CM/SEC
BH CV ECHO MEAS - MV V2 MEAN: 49.9 CM/SEC
BH CV ECHO MEAS - MV V2 VTI: 22.2 CM
BH CV ECHO MEAS - MVA P1/2T LCG: 2.1 CM^2
BH CV ECHO MEAS - MVA(P1/2T): 4.4 CM^2
BH CV ECHO MEAS - PA ACC SLOPE: 278 CM/SEC^2
BH CV ECHO MEAS - PA ACC TIME: 0.19 SEC
BH CV ECHO MEAS - PA MAX PG: 3 MMHG
BH CV ECHO MEAS - PA MEAN PG: 2 MMHG
BH CV ECHO MEAS - PA PR(ACCEL): -5.2 MMHG
BH CV ECHO MEAS - PA V2 MAX: 86.9 CM/SEC
BH CV ECHO MEAS - PA V2 MEAN: 64.5 CM/SEC
BH CV ECHO MEAS - PA V2 VTI: 19.9 CM
BH CV ECHO MEAS - RAP SYSTOLE: 10 MMHG
BH CV ECHO MEAS - RVDD: 2.5 CM
BH CV ECHO MEAS - RVSP: 27 MMHG
BH CV ECHO MEAS - SI(AO): 80.1 ML/M^2
BH CV ECHO MEAS - SI(CUBED): 38.4 ML/M^2
BH CV ECHO MEAS - SI(MOD-SP2): 14.5 ML/M^2
BH CV ECHO MEAS - SI(MOD-SP4): 15.4 ML/M^2
BH CV ECHO MEAS - SI(TEICH): 33.1 ML/M^2
BH CV ECHO MEAS - SV(AO): 176.6 ML
BH CV ECHO MEAS - SV(CUBED): 84.6 ML
BH CV ECHO MEAS - SV(MOD-SP2): 32 ML
BH CV ECHO MEAS - SV(MOD-SP4): 34 ML
BH CV ECHO MEAS - SV(TEICH): 72.9 ML
BH CV ECHO MEAS - TR MAX VEL: 206 CM/SEC
BH CV STRESS BP STAGE 1: NORMAL
BH CV STRESS BP STAGE 2: NORMAL
BH CV STRESS BP STAGE 3: NORMAL
BH CV STRESS DURATION MIN STAGE 1: 3
BH CV STRESS DURATION MIN STAGE 2: 3
BH CV STRESS DURATION MIN STAGE 3: 3
BH CV STRESS DURATION SEC STAGE 1: 0
BH CV STRESS DURATION SEC STAGE 2: 0
BH CV STRESS DURATION SEC STAGE 3: 0
BH CV STRESS GRADE STAGE 1: 10
BH CV STRESS GRADE STAGE 2: 12
BH CV STRESS GRADE STAGE 3: 14
BH CV STRESS HR STAGE 1: 109
BH CV STRESS HR STAGE 2: 130
BH CV STRESS HR STAGE 3: 137
BH CV STRESS METS STAGE 1: 5
BH CV STRESS METS STAGE 2: 7.5
BH CV STRESS METS STAGE 3: 10
BH CV STRESS PROTOCOL 1: NORMAL
BH CV STRESS RECOVERY BP: NORMAL MMHG
BH CV STRESS RECOVERY HR: 77 BPM
BH CV STRESS SPEED STAGE 1: 1.7
BH CV STRESS SPEED STAGE 2: 2.5
BH CV STRESS SPEED STAGE 3: 3.4
BH CV STRESS STAGE 1: 1
BH CV STRESS STAGE 2: 2
BH CV STRESS STAGE 3: 3
MAXIMAL PREDICTED HEART RATE: 185 BPM
MAXIMAL PREDICTED HEART RATE: 185 BPM
PERCENT MAX PREDICTED HR: 74.05 %
STRESS BASELINE BP: NORMAL MMHG
STRESS BASELINE HR: 91 BPM
STRESS PERCENT HR: 87 %
STRESS POST ESTIMATED WORKLOAD: 7 METS
STRESS POST EXERCISE DUR MIN: 6 MIN
STRESS POST EXERCISE DUR SEC: 33 SEC
STRESS POST PEAK BP: NORMAL MMHG
STRESS POST PEAK HR: 137 BPM
STRESS TARGET HR: 157 BPM
STRESS TARGET HR: 157 BPM

## 2019-06-21 NOTE — TELEPHONE ENCOUNTER
----- Message from Deanna Mott MD sent at 6/21/2019  9:15 AM EDT -----  Stress test does not show any blockages but because you could not finish the stress test Lexiscan stress test is recommended.

## 2019-07-03 ENCOUNTER — HOSPITAL ENCOUNTER (OUTPATIENT)
Dept: NUCLEAR MEDICINE | Facility: HOSPITAL | Age: 34
Discharge: HOME | End: 2019-07-03

## 2019-07-03 ENCOUNTER — HOSPITAL ENCOUNTER (OUTPATIENT)
Dept: CARDIOLOGY | Facility: HOSPITAL | Age: 34
Discharge: HOME | End: 2019-07-03

## 2019-07-03 DIAGNOSIS — R07.9 CHEST PAIN IN ADULT: ICD-10-CM

## 2019-07-03 PROCEDURE — 25010000002 REGADENOSON 0.4 MG/5ML SOLUTION: Performed by: INTERNAL MEDICINE

## 2019-07-03 PROCEDURE — 0 TECHNETIUM SESTAMIBI: Performed by: INTERNAL MEDICINE

## 2019-07-03 PROCEDURE — A9500 TC99M SESTAMIBI: HCPCS | Performed by: INTERNAL MEDICINE

## 2019-07-03 PROCEDURE — 93017 CV STRESS TEST TRACING ONLY: CPT

## 2019-07-03 PROCEDURE — 78452 HT MUSCLE IMAGE SPECT MULT: CPT | Performed by: INTERNAL MEDICINE

## 2019-07-03 PROCEDURE — 78452 HT MUSCLE IMAGE SPECT MULT: CPT

## 2019-07-03 PROCEDURE — 93018 CV STRESS TEST I&R ONLY: CPT | Performed by: INTERNAL MEDICINE

## 2019-07-03 RX ADMIN — TECHNETIUM TC 99M SESTAMIBI 1 DOSE: 1 INJECTION INTRAVENOUS at 08:10

## 2019-07-03 RX ADMIN — TECHNETIUM TC 99M SESTAMIBI 1 DOSE: 1 INJECTION INTRAVENOUS at 09:40

## 2019-07-03 RX ADMIN — REGADENOSON 0.4 MG: 0.08 INJECTION, SOLUTION INTRAVENOUS at 09:40

## 2019-07-05 NOTE — PLAN OF CARE
Problem: Patient Care Overview (Adult)  Goal: Discharge Needs Assessment  Outcome: Ongoing (interventions implemented as appropriate)    08/18/17 9049   Discharge Needs Assessment   Readmission Within The Last 30 Days no previous admission in last 30 days           
Problem: Perioperative Period (Adult)  Goal: Signs and Symptoms of Listed Potential Problems Will be Absent or Manageable (Perioperative Period)  Outcome: Ongoing (interventions implemented as appropriate)      
Problem: Perioperative Period (Adult)  Goal: Signs and Symptoms of Listed Potential Problems Will be Absent or Manageable (Perioperative Period)  Outcome: Ongoing (interventions implemented as appropriate)    08/18/17 7628   Perioperative Period   Problems Assessed (Perioperative Period) pain   Problems Present (Perioperative Period) none           
foreign body throat

## 2019-07-09 LAB
BH CV NUCLEAR PRIOR STUDY: 3
BH CV STRESS BP STAGE 1: NORMAL
BH CV STRESS BP STAGE 2: NORMAL
BH CV STRESS COMMENTS STAGE 1: NORMAL
BH CV STRESS COMMENTS STAGE 2: NORMAL
BH CV STRESS DOSE REGADENOSON STAGE 1: 0.4
BH CV STRESS DURATION MIN STAGE 1: 0
BH CV STRESS DURATION MIN STAGE 2: 4
BH CV STRESS DURATION SEC STAGE 1: 10
BH CV STRESS DURATION SEC STAGE 2: 0
BH CV STRESS HR STAGE 1: 89
BH CV STRESS HR STAGE 2: 72
BH CV STRESS PROTOCOL 1: NORMAL
BH CV STRESS RECOVERY BP: NORMAL MMHG
BH CV STRESS RECOVERY HR: 72 BPM
BH CV STRESS STAGE 1: 1
BH CV STRESS STAGE 2: 2
LV EF NUC BP: 75 %
MAXIMAL PREDICTED HEART RATE: 185 BPM
PERCENT MAX PREDICTED HR: 48.11 %
STRESS BASELINE BP: NORMAL MMHG
STRESS BASELINE HR: 72 BPM
STRESS PERCENT HR: 57 %
STRESS POST PEAK BP: NORMAL MMHG
STRESS POST PEAK HR: 89 BPM
STRESS TARGET HR: 157 BPM

## 2019-07-10 ENCOUNTER — OFFICE VISIT (OUTPATIENT)
Dept: CARDIOLOGY | Facility: CLINIC | Age: 34
End: 2019-07-10

## 2019-07-10 VITALS
WEIGHT: 246 LBS | HEIGHT: 68 IN | BODY MASS INDEX: 37.28 KG/M2 | HEART RATE: 77 BPM | OXYGEN SATURATION: 98 % | SYSTOLIC BLOOD PRESSURE: 114 MMHG | DIASTOLIC BLOOD PRESSURE: 76 MMHG

## 2019-07-10 DIAGNOSIS — I34.0 NON-RHEUMATIC MITRAL REGURGITATION: ICD-10-CM

## 2019-07-10 DIAGNOSIS — E66.9 OBESITY (BMI 35.0-39.9 WITHOUT COMORBIDITY): ICD-10-CM

## 2019-07-10 DIAGNOSIS — R07.89 ATYPICAL CHEST PAIN: Primary | ICD-10-CM

## 2019-07-10 PROCEDURE — 99213 OFFICE O/P EST LOW 20 MIN: CPT | Performed by: INTERNAL MEDICINE

## 2019-07-10 NOTE — PROGRESS NOTES
subjective     Chief Complaint   Patient presents with   • Chest Pain   • Results     Stress and ECHO     History of Present Illness  Patient is 34 years old white female who was seen by me because of chest pain.  Patient underwent cardiac work-up and is here for follow-up.  She has been doing very well at this time.  She is taking Corgard 20 mg daily for migraines however it is helping heart rate also.    Past Surgical History:   Procedure Laterality Date   • ABDOMINAL SURGERY     • EXTRACORPOREAL SHOCK WAVE LITHOTRIPSY (ESWL) Left 8/18/2017    Procedure: EXTRACORPOREAL SHOCKWAVE LITHOTRIPSY, FLEXIBLE URETEROSCOPY;  Surgeon: Driss Wharton MD;  Location: [facility];  Service:    • GALLBLADDER SURGERY     • KIDNEY STONE SURGERY       Family History   Problem Relation Age of Onset   • Diabetes Father    • Thyroid disease Mother      Past Medical History:   Diagnosis Date   • Arthritis    • Blood clots in brain     2003  ALSO ONE IN LEG IN 2003.   • Dental bridge present    • Isabella filter in place     SINCE 2003   • History of transfusion    • History of unsteady gait     SINCE STROKE   • Hypertension    • Kidney stone    • Lupus    • Migraine    • Seizures (CMS/HCC)     LAST ONE 2003   • Stroke (CMS/HCC)     2003     Patient Active Problem List   Diagnosis   • Hypertension   • Migraine   • Seizures (CMS/HCC)   • Lupus   • Disseminated lupus erythematosus (CMS/HCC)   • Atypical chest pain   • Obesity (BMI 35.0-39.9 without comorbidity)   • Non-rheumatic mitral regurgitation       Social History     Tobacco Use   • Smoking status: Never Smoker   • Smokeless tobacco: Never Used   Substance Use Topics   • Alcohol use: No   • Drug use: No       Allergies   Allergen Reactions   • Dilantin [Phenytoin]    • Phenytoin Sodium Extended        Current Outpatient Medications on File Prior to Visit   Medication Sig   • APTIOM 800 MG tablet Take 800 mg by mouth 1 (One) Time.   • DULoxetine (CYMBALTA) 60 MG capsule Take  1 capsule by mouth Daily.   • fexofenadine (ALLEGRA ALLERGY) 180 MG tablet Take 1 tablet by mouth Daily.   • fluticasone (FLONASE) 50 MCG/ACT nasal spray 2 sprays into the nostril(s) as directed by provider Daily.   • hydroxychloroquine (PLAQUENIL) 200 MG tablet Take 200 mg by mouth Daily.   • melatonin 5 MG tablet tablet Take 5 mg by mouth.   • nadolol (CORGARD) 40 MG tablet Take 20 mg by mouth Daily.   • nortriptyline (PAMELOR) 10 MG capsule Take 20 mg by mouth Every Night.   • pantoprazole (PROTONIX) 40 MG EC tablet Take 1 tablet by mouth Daily.   • promethazine (PHENERGAN) 25 MG tablet Take 25 mg by mouth Every 8 (Eight) Hours As Needed.   • raNITIdine (ZANTAC) 150 MG tablet Take 1 tablet by mouth Every 12 (Twelve) Hours.   • SUMAtriptan (IMITREX) 100 MG tablet Take 100 mg by mouth 1 (One) Time As Needed.   • tiZANidine (ZANAFLEX) 2 MG tablet Take 2 mg by mouth 3 (Three) Times a Day As Needed (TAKES 2 AT BED TIME).   • traMADol (ULTRAM) 50 MG tablet Take  by mouth 3 (Three) Times a Day As Needed.   • vitamin D (ERGOCALCIFEROL) 90268 units capsule capsule Take 50,000 Units by mouth 1 (One) Time Per Week.     No current facility-administered medications on file prior to visit.          The following portions of the patient's history were reviewed and updated as appropriate: allergies, current medications, past family history, past medical history, past social history, past surgical history and problem list.    Review of Systems   Constitution: Negative.   HENT: Negative.  Negative for congestion.    Eyes: Negative.    Cardiovascular: Negative.  Negative for chest pain, cyanosis, dyspnea on exertion, irregular heartbeat, leg swelling, near-syncope, orthopnea, palpitations, paroxysmal nocturnal dyspnea and syncope.   Respiratory: Negative.  Negative for shortness of breath.    Hematologic/Lymphatic: Negative.    Musculoskeletal: Negative.    Gastrointestinal: Negative.    Neurological: Negative.  Negative for  "headaches.          Objective:     /76 (BP Location: Left arm, Patient Position: Sitting)   Pulse 77   Ht 172.5 cm (67.9\")   Wt 112 kg (246 lb)   SpO2 98%   BMI 37.51 kg/m²   Physical Exam   Constitutional: She appears well-developed and well-nourished. No distress.   HENT:   Head: Normocephalic and atraumatic.   Mouth/Throat: Oropharynx is clear and moist. No oropharyngeal exudate.   Eyes: Conjunctivae and EOM are normal. Pupils are equal, round, and reactive to light. No scleral icterus.   Neck: Normal range of motion. Neck supple. No JVD present. No tracheal deviation present. No thyromegaly present.   Cardiovascular: Normal rate, regular rhythm, normal heart sounds and intact distal pulses. PMI is not displaced. Exam reveals no gallop, no friction rub and no decreased pulses.   No murmur heard.  Pulses:       Carotid pulses are 3+ on the right side, and 3+ on the left side.       Radial pulses are 3+ on the right side, and 3+ on the left side.   Pulmonary/Chest: Effort normal and breath sounds normal. No respiratory distress. She has no wheezes. She has no rales. She exhibits no tenderness.   Abdominal: Soft. Bowel sounds are normal. She exhibits no distension, no abdominal bruit and no mass. There is no splenomegaly or hepatomegaly. There is no tenderness. There is no rebound and no guarding.   Musculoskeletal: Normal range of motion. She exhibits no edema, tenderness or deformity.   Lymphadenopathy:     She has no cervical adenopathy.   Neurological: She is alert. She has normal reflexes. No cranial nerve deficit. She exhibits normal muscle tone. Coordination normal.   Skin: Skin is warm and dry. No rash noted. She is not diaphoretic. No erythema.   Psychiatric: She has a normal mood and affect. Her behavior is normal. Judgment and thought content normal.         Lab Review  Lab Results   Component Value Date     02/16/2019    K 4.3 02/16/2019     02/16/2019    BUN 13 02/16/2019    " CREATININE 0.70 02/16/2019    GLUCOSE 93 02/16/2019    CALCIUM 9.4 02/16/2019    ALT 16 02/16/2019    ALKPHOS 73 02/16/2019    LABIL2 1.5 05/28/2016     No results found for: CKTOTAL  Lab Results   Component Value Date    WBC 5.92 02/16/2019    HGB 13.9 02/16/2019    HCT 44.2 02/16/2019     02/16/2019     No results found for: INR  Lab Results   Component Value Date    MG 1.8 03/29/2015     No results found for: PSA, TSH  No results found for: BNP  No results found for: CHLPL, CHOL, TRIG, HDL, VLDL, LDLHDL  No results found for: LDL    Procedures  Interpretation Summary     · A pharmacological stress test was performed using regadenoson with low-level exercise.  · Baseline ECG of normal sinus rhythm noted. Right axis deviation.  · Findings consistent with a normal ECG stress test.  · Left ventricular ejection fraction is hyperdynamic (Calculated EF > 70%).  · Myocardial perfusion imaging indicates a normal myocardial perfusion study with no evidence of ischemia.  · Impressions are consistent with a low risk study.  · There is no prior study available for comparison.     Interpretation Summary     · Normal left ventricular cavity size and wall thickness noted.  · Left ventricular systolic function is normal. Estimated EF appears to be in the range of 61 - 65%.  · Left ventricular diastolic function is normal.  · Mild mitral valve regurgitation is present.  · Physiologic tricuspid valve regurgitation is present. No evidence of pulmonary hypertension is present.  · There is no evidence of pericardial effusion.          I personally viewed and interpreted the patient's LAB data         Assessment:     1. Atypical chest pain    2. Non-rheumatic mitral regurgitation    3. Obesity (BMI 35.0-39.9 without comorbidity)          Plan:     Patient is here for follow-up.  Stress test and echocardiogram results were reviewed with the patient  Stress test is negative for significant exercise-induced myocardial  ischemia  Echocardiogram shows mild mitral regurg  Healthy lifestyle and weight loss was emphasized.  She was advised to continue Corgard.  No change in therapy was made.  Follow-up on PRN basis        No Follow-up on file.

## 2019-09-17 ENCOUNTER — APPOINTMENT (OUTPATIENT)
Dept: LAB | Facility: HOSPITAL | Age: 34
End: 2019-09-17

## 2019-09-17 ENCOUNTER — TRANSCRIBE ORDERS (OUTPATIENT)
Dept: ADMINISTRATIVE | Facility: HOSPITAL | Age: 34
End: 2019-09-17

## 2019-09-17 DIAGNOSIS — M32.9 SYSTEMIC LUPUS ERYTHEMATOSUS, UNSPECIFIED SLE TYPE, UNSPECIFIED ORGAN INVOLVEMENT STATUS (HCC): ICD-10-CM

## 2019-09-17 DIAGNOSIS — E55.9 VITAMIN D DEFICIENCY, UNSPECIFIED: Primary | ICD-10-CM

## 2019-09-17 DIAGNOSIS — Z79.899 OTHER LONG TERM (CURRENT) DRUG THERAPY: ICD-10-CM

## 2019-09-17 PROCEDURE — 85025 COMPLETE CBC W/AUTO DIFF WBC: CPT | Performed by: INTERNAL MEDICINE

## 2019-09-17 PROCEDURE — 81001 URINALYSIS AUTO W/SCOPE: CPT | Performed by: INTERNAL MEDICINE

## 2019-09-17 PROCEDURE — 80053 COMPREHEN METABOLIC PANEL: CPT | Performed by: INTERNAL MEDICINE

## 2019-09-17 PROCEDURE — 36415 COLL VENOUS BLD VENIPUNCTURE: CPT | Performed by: INTERNAL MEDICINE

## 2019-09-18 LAB
ALBUMIN SERPL-MCNC: 5.2 G/DL (ref 3.5–5.2)
ALBUMIN/GLOB SERPL: 2.3 G/DL
ALP SERPL-CCNC: 66 U/L (ref 39–117)
ALT SERPL W P-5'-P-CCNC: 10 U/L (ref 1–33)
ANION GAP SERPL CALCULATED.3IONS-SCNC: 10 MMOL/L (ref 5–15)
AST SERPL-CCNC: 15 U/L (ref 1–32)
BACTERIA UR QL AUTO: NORMAL /HPF
BASOPHILS # BLD AUTO: 0.03 10*3/MM3 (ref 0–0.2)
BASOPHILS NFR BLD AUTO: 0.4 % (ref 0–1.5)
BILIRUB SERPL-MCNC: <0.2 MG/DL (ref 0.2–1.2)
BILIRUB UR QL STRIP: NEGATIVE
BUN BLD-MCNC: 13 MG/DL (ref 6–20)
BUN/CREAT SERPL: 20.3 (ref 7–25)
CALCIUM SPEC-SCNC: 9.5 MG/DL (ref 8.6–10.5)
CHLORIDE SERPL-SCNC: 104 MMOL/L (ref 98–107)
CLARITY UR: CLEAR
CO2 SERPL-SCNC: 27 MMOL/L (ref 22–29)
COLOR UR: YELLOW
CREAT BLD-MCNC: 0.64 MG/DL (ref 0.57–1)
DEPRECATED RDW RBC AUTO: 47.6 FL (ref 37–54)
EOSINOPHIL # BLD AUTO: 0.09 10*3/MM3 (ref 0–0.4)
EOSINOPHIL NFR BLD AUTO: 1.3 % (ref 0.3–6.2)
ERYTHROCYTE [DISTWIDTH] IN BLOOD BY AUTOMATED COUNT: 14.5 % (ref 12.3–15.4)
GFR SERPL CREATININE-BSD FRML MDRD: 106 ML/MIN/1.73
GLOBULIN UR ELPH-MCNC: 2.3 GM/DL
GLUCOSE BLD-MCNC: 74 MG/DL (ref 65–99)
GLUCOSE UR STRIP-MCNC: NEGATIVE MG/DL
HCT VFR BLD AUTO: 43.8 % (ref 34–46.6)
HGB BLD-MCNC: 12.8 G/DL (ref 12–15.9)
HGB UR QL STRIP.AUTO: NEGATIVE
HYALINE CASTS UR QL AUTO: NORMAL /LPF
IMM GRANULOCYTES # BLD AUTO: 0.02 10*3/MM3 (ref 0–0.05)
IMM GRANULOCYTES NFR BLD AUTO: 0.3 % (ref 0–0.5)
KETONES UR QL STRIP: NEGATIVE
LEUKOCYTE ESTERASE UR QL STRIP.AUTO: NEGATIVE
LYMPHOCYTES # BLD AUTO: 2.45 10*3/MM3 (ref 0.7–3.1)
LYMPHOCYTES NFR BLD AUTO: 35.7 % (ref 19.6–45.3)
MCH RBC QN AUTO: 26.3 PG (ref 26.6–33)
MCHC RBC AUTO-ENTMCNC: 29.2 G/DL (ref 31.5–35.7)
MCV RBC AUTO: 89.9 FL (ref 79–97)
MONOCYTES # BLD AUTO: 0.63 10*3/MM3 (ref 0.1–0.9)
MONOCYTES NFR BLD AUTO: 9.2 % (ref 5–12)
NEUTROPHILS # BLD AUTO: 3.64 10*3/MM3 (ref 1.7–7)
NEUTROPHILS NFR BLD AUTO: 53.1 % (ref 42.7–76)
NITRITE UR QL STRIP: NEGATIVE
NRBC BLD AUTO-RTO: 0 /100 WBC (ref 0–0.2)
PH UR STRIP.AUTO: 5.5 [PH] (ref 5–8)
PLATELET # BLD AUTO: 225 10*3/MM3 (ref 140–450)
PMV BLD AUTO: 11.2 FL (ref 6–12)
POTASSIUM BLD-SCNC: 4.7 MMOL/L (ref 3.5–5.2)
PROT SERPL-MCNC: 7.5 G/DL (ref 6–8.5)
PROT UR QL STRIP: ABNORMAL
RBC # BLD AUTO: 4.87 10*6/MM3 (ref 3.77–5.28)
RBC # UR: NORMAL /HPF
REF LAB TEST METHOD: NORMAL
SODIUM BLD-SCNC: 141 MMOL/L (ref 136–145)
SP GR UR STRIP: 1.03 (ref 1–1.03)
SQUAMOUS #/AREA URNS HPF: NORMAL /HPF
UROBILINOGEN UR QL STRIP: ABNORMAL
WBC NRBC COR # BLD: 6.86 10*3/MM3 (ref 3.4–10.8)
WBC UR QL AUTO: NORMAL /HPF

## 2019-10-07 ENCOUNTER — OFFICE VISIT (OUTPATIENT)
Dept: UROLOGY | Facility: CLINIC | Age: 34
End: 2019-10-07

## 2019-10-07 VITALS — HEIGHT: 68 IN | BODY MASS INDEX: 37.44 KG/M2 | WEIGHT: 247 LBS

## 2019-10-07 DIAGNOSIS — N20.0 KIDNEY STONE: Primary | ICD-10-CM

## 2019-10-07 LAB
BILIRUB BLD-MCNC: NEGATIVE MG/DL
CLARITY, POC: CLEAR
COLOR UR: YELLOW
GLUCOSE UR STRIP-MCNC: NEGATIVE MG/DL
KETONES UR QL: NEGATIVE
LEUKOCYTE EST, POC: NEGATIVE
NITRITE UR-MCNC: NEGATIVE MG/ML
PH UR: 5.5 [PH] (ref 5–8)
PROT UR STRIP-MCNC: NEGATIVE MG/DL
RBC # UR STRIP: NEGATIVE /UL
SP GR UR: 1.01 (ref 1–1.03)
UROBILINOGEN UR QL: NORMAL

## 2019-10-07 PROCEDURE — 99212 OFFICE O/P EST SF 10 MIN: CPT | Performed by: UROLOGY

## 2019-10-07 NOTE — PROGRESS NOTES
Chief Complaint:          stones    HPI:   34 y.o. female.    HPI  Pt has not had any stone pain in a year.  She has no hx of urinary tract infections.    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   • Dental bridge present    • Isabella filter in place     SINCE 2003   • History of transfusion    • History of unsteady gait     SINCE STROKE   • Hypertension    • Kidney stone    • Lupus (CMS/HCC)    • Migraine    • Seizures (CMS/HCC)     LAST ONE 2003   • Stroke (CMS/HCC)     2003         Current Meds:     Current Outpatient Medications   Medication Sig Dispense Refill   • APTIOM 800 MG tablet Take 800 mg by mouth 1 (One) Time.     • DULoxetine (CYMBALTA) 60 MG capsule Take 1 capsule by mouth Daily. 30 capsule 0   • fexofenadine (ALLEGRA ALLERGY) 180 MG tablet Take 1 tablet by mouth Daily. 30 tablet 5   • fluticasone (FLONASE) 50 MCG/ACT nasal spray 2 sprays into the nostril(s) as directed by provider Daily. 15.8 mL 2   • hydroxychloroquine (PLAQUENIL) 200 MG tablet Take 200 mg by mouth Daily.     • melatonin 5 MG tablet tablet Take 5 mg by mouth.     • nadolol (CORGARD) 40 MG tablet Take 20 mg by mouth Daily.     • nortriptyline (PAMELOR) 10 MG capsule Take 20 mg by mouth Every Night.     • pantoprazole (PROTONIX) 40 MG EC tablet Take 1 tablet by mouth Daily.     • promethazine (PHENERGAN) 25 MG tablet Take 25 mg by mouth Every 8 (Eight) Hours As Needed.     • raNITIdine (ZANTAC) 150 MG tablet Take 1 tablet by mouth Every 12 (Twelve) Hours.     • SUMAtriptan (IMITREX) 100 MG tablet Take 100 mg by mouth 1 (One) Time As Needed.     • tiZANidine (ZANAFLEX) 2 MG tablet Take 2 mg by mouth 3 (Three) Times a Day As Needed (TAKES 2 AT BED TIME).     • traMADol (ULTRAM) 50 MG tablet Take  by mouth 3 (Three) Times a Day As Needed.     • vitamin D (ERGOCALCIFEROL) 71712 units capsule capsule Take 50,000 Units by mouth 1 (One)  Time Per Week.       No current facility-administered medications for this visit.         Allergies:      Allergies   Allergen Reactions   • Dilantin [Phenytoin]    • Phenytoin Sodium Extended         Past Surgical History:     Past Surgical History:   Procedure Laterality Date   • ABDOMINAL SURGERY     • EXTRACORPOREAL SHOCK WAVE LITHOTRIPSY (ESWL) Left 8/18/2017    Procedure: EXTRACORPOREAL SHOCKWAVE LITHOTRIPSY, FLEXIBLE URETEROSCOPY;  Surgeon: Driss Wharton MD;  Location: [facility];  Service:    • GALLBLADDER SURGERY     • KIDNEY STONE SURGERY           Social History:     Social History     Socioeconomic History   • Marital status: Single     Spouse name: Not on file   • Number of children: Not on file   • Years of education: Not on file   • Highest education level: Not on file   Tobacco Use   • Smoking status: Never Smoker   • Smokeless tobacco: Never Used   Substance and Sexual Activity   • Alcohol use: No   • Drug use: No   • Sexual activity: Defer     Birth control/protection: None       Family History:     Family History   Problem Relation Age of Onset   • Diabetes Father    • Thyroid disease Mother        Review of Systems:     Review of Systems   Constitutional: Negative for chills, fatigue and fever.   HENT: Negative for congestion and sinus pressure.    Respiratory: Negative for shortness of breath.    Cardiovascular: Negative for chest pain.   Gastrointestinal: Negative for abdominal pain, constipation, diarrhea, nausea and vomiting.   Genitourinary: Negative for flank pain and urgency.   Musculoskeletal: Negative for back pain and neck pain.   Neurological: Negative for dizziness and headaches.   Hematological: Does not bruise/bleed easily.   Psychiatric/Behavioral: The patient is not nervous/anxious.              I have reviewed the follow portions of the patient's history and confirmed they are accurate today:  allergies, current medications, past family history, past medical history, past  "social history, past surgical history, problem list and ROS  Physical Exam:     Physical Exam   Constitutional: She is oriented to person, place, and time. She appears well-developed.   HENT:   Head: Normocephalic.   Right Ear: External ear normal.   Left Ear: External ear normal.   Nose: Nose normal.   Mouth/Throat: Oropharynx is clear and moist.   Eyes: Conjunctivae and EOM are normal. Pupils are equal, round, and reactive to light.   Neck: Normal range of motion. Neck supple. No tracheal deviation present. No thyromegaly present.   Cardiovascular: Normal heart sounds. Exam reveals no gallop and no friction rub.   No murmur heard.  Pulmonary/Chest: Effort normal and breath sounds normal. No respiratory distress. She has no wheezes. She has no rales. She exhibits no tenderness.   Abdominal: Soft. Bowel sounds are normal. She exhibits no distension and no mass. There is no tenderness. There is no rebound and no guarding. No hernia.   Musculoskeletal: Normal range of motion. She exhibits no edema.   Lymphadenopathy:     She has no cervical adenopathy.   Neurological: She is alert and oriented to person, place, and time. She displays normal reflexes. No cranial nerve deficit. She exhibits normal muscle tone. Coordination normal.   Skin: Skin is warm. No rash noted.   Psychiatric: She has a normal mood and affect. Judgment normal.       Ht 172.7 cm (68\")   Wt 112 kg (247 lb)   BMI 37.56 kg/m²    Procedure:         Assessment:     Encounter Diagnosis   Name Primary?   • Kidney stone Yes       Orders Placed This Encounter   Procedures   • POC Urinalysis Dipstick, Automated       Patient reports that she is not currently experiencing any symptoms of urinary incontinence.      Plan:   Will see pt back in a year.    Patient's Body mass index is 37.56 kg/m². BMI is above normal parameters. Recommendations include: educational material.      Smoking Cessation Counseling:  Former smoker.  Patient does not currently use any " tobacco products.       Counseling was given to patient for the following topics instructions for management as follows: stones. The interim medical history and current results were reviewed.  A treatment plan with follow-up was made for Kidney stone [N20.0]     This document has been electronically signed by Driss Wharton MD October 8, 2019 8:33 AM

## 2019-10-21 ENCOUNTER — OFFICE VISIT (OUTPATIENT)
Dept: UROLOGY | Facility: CLINIC | Age: 34
End: 2019-10-21

## 2019-10-21 VITALS — HEIGHT: 68 IN | BODY MASS INDEX: 37.8 KG/M2 | WEIGHT: 249.4 LBS

## 2019-10-21 DIAGNOSIS — R10.9 FLANK PAIN WITH HISTORY OF UROLITHIASIS: ICD-10-CM

## 2019-10-21 DIAGNOSIS — R35.0 FREQUENCY OF URINATION: Primary | ICD-10-CM

## 2019-10-21 DIAGNOSIS — Z87.442 FLANK PAIN WITH HISTORY OF UROLITHIASIS: ICD-10-CM

## 2019-10-21 LAB
BILIRUB BLD-MCNC: ABNORMAL MG/DL
CLARITY, POC: CLEAR
COLOR UR: YELLOW
GLUCOSE UR STRIP-MCNC: ABNORMAL MG/DL
KETONES UR QL: NEGATIVE
LEUKOCYTE EST, POC: NEGATIVE
NITRITE UR-MCNC: NEGATIVE MG/ML
PH UR: 5.5 [PH] (ref 5–8)
PROT UR STRIP-MCNC: ABNORMAL MG/DL
RBC # UR STRIP: NEGATIVE /UL
SP GR UR: 1.03 (ref 1–1.03)
UROBILINOGEN UR QL: NORMAL

## 2019-10-21 PROCEDURE — 99212 OFFICE O/P EST SF 10 MIN: CPT | Performed by: UROLOGY

## 2019-10-21 NOTE — PROGRESS NOTES
"Chief Complaint:          Rigth flank pain     HPI:   34 y.o. female.    Pt is seen for right flank pain. She describes her pain as dull, intermittent. She does not have any Nausea or vomiting. She states she would like a \"lasix scan\".    She denies dysuria, frequency, urgency and nocturia. Her urine dip stick is negative for leuks and nitrite.    She states she has a history of lower back pain and sciatica down to her right leg. She does not have numbness or tingling, she does not have any bowel or urinary incontinence.    She states she has had kidney stones in the past. None in the last three years. Her last  3 KUB were negative for stones She states  She has lupus and wonder if its \"hurting her kidneys\".      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   • Dental bridge present    • [place] filter in place     SINCE 2003   • History of transfusion    • History of unsteady gait     SINCE STROKE   • Hypertension    • Kidney stone    • Lupus (CMS/[facility])    • Migraine    • Seizures (CMS/[facility])     LAST ONE 2003   • Stroke (CMS/[facility])     2003         Current Meds:     Current Outpatient Medications   Medication Sig Dispense Refill   • APTIOM 800 MG tablet Take 800 mg by mouth 1 (One) Time.     • DULoxetine (CYMBALTA) 60 MG capsule Take 1 capsule by mouth Daily. 30 capsule 0   • fexofenadine (ALLEGRA ALLERGY) 180 MG tablet Take 1 tablet by mouth Daily. 30 tablet 5   • fluticasone (FLONASE) 50 MCG/ACT nasal spray 2 sprays into the nostril(s) as directed by provider Daily. 15.8 mL 2   • hydroxychloroquine (PLAQUENIL) 200 MG tablet Take 200 mg by mouth Daily.     • melatonin 5 MG tablet tablet Take 5 mg by mouth.     • nadolol (CORGARD) 40 MG tablet Take 20 mg by mouth Daily.     • nortriptyline (PAMELOR) 10 MG capsule Take 20 mg by mouth Every Night.     • pantoprazole (PROTONIX) 40 MG EC tablet Take 1 tablet by mouth Daily. "     • promethazine (PHENERGAN) 25 MG tablet Take 25 mg by mouth Every 8 (Eight) Hours As Needed.     • raNITIdine (ZANTAC) 150 MG tablet Take 1 tablet by mouth Every 12 (Twelve) Hours.     • SUMAtriptan (IMITREX) 100 MG tablet Take 100 mg by mouth 1 (One) Time As Needed.     • tiZANidine (ZANAFLEX) 2 MG tablet Take 2 mg by mouth 3 (Three) Times a Day As Needed (TAKES 2 AT BED TIME).     • traMADol (ULTRAM) 50 MG tablet Take  by mouth 3 (Three) Times a Day As Needed.     • vitamin D (ERGOCALCIFEROL) 16034 units capsule capsule Take 50,000 Units by mouth 1 (One) Time Per Week.       No current facility-administered medications for this visit.         Allergies:      Allergies   Allergen Reactions   • Dilantin [Phenytoin]    • Phenytoin Sodium Extended         Past Surgical History:     Past Surgical History:   Procedure Laterality Date   • ABDOMINAL SURGERY     • EXTRACORPOREAL SHOCK WAVE LITHOTRIPSY (ESWL) Left 8/18/2017    Procedure: EXTRACORPOREAL SHOCKWAVE LITHOTRIPSY, FLEXIBLE URETEROSCOPY;  Surgeon: Driss Wharton MD;  Location: [facility];  Service:    • GALLBLADDER SURGERY     • KIDNEY STONE SURGERY           Social History:     Social History     Socioeconomic History   • Marital status: Single     Spouse name: Not on file   • Number of children: Not on file   • Years of education: Not on file   • Highest education level: Not on file   Tobacco Use   • Smoking status: Never Smoker   • Smokeless tobacco: Never Used   Substance and Sexual Activity   • Alcohol use: No   • Drug use: No   • Sexual activity: Defer     Birth control/protection: None       Family History:     Family History   Problem Relation Age of Onset   • Diabetes Father    • Thyroid disease Mother        Review of Systems:     Review of Systems   Constitutional: Negative for fatigue.   HENT: Negative for sinus pressure and sinus pain.    Eyes: Negative for pain and redness.   Respiratory: Negative for chest tightness.    Cardiovascular:  "Negative for chest pain.   Gastrointestinal: Negative for abdominal pain.   Genitourinary: Negative for dysuria and flank pain.   Musculoskeletal: Negative for back pain.   Skin: Negative for rash.   Allergic/Immunologic: Negative for food allergies.   Neurological: Negative for headaches.   Hematological: Does not bruise/bleed easily.   Psychiatric/Behavioral: The patient is not nervous/anxious.          Physical Exam:     Physical Exam   Constitutional: She is oriented to person, place, and time. She appears well-developed and well-nourished. No distress.   HENT:   Head: Normocephalic and atraumatic.   Eyes: EOM are normal. Pupils are equal, round, and reactive to light.   Neck: Normal range of motion. Neck supple. No tracheal deviation present. No thyromegaly present.   Cardiovascular: Normal rate and regular rhythm.   Pulmonary/Chest: Effort normal and breath sounds normal. No respiratory distress. She exhibits no tenderness.   Abdominal: Soft. Bowel sounds are normal. She exhibits distension. There is no tenderness. There is no guarding. No hernia.   Genitourinary: Vagina normal.   Musculoskeletal: Normal range of motion. She exhibits no edema, tenderness or deformity.   She has right flank/ and lower back pain   Neurological: She is alert and oriented to person, place, and time. No sensory deficit.   Skin: Skin is warm and dry. Capillary refill takes less than 2 seconds. No erythema. No pallor.   Psychiatric: She has a normal mood and affect. Her behavior is normal.       Ht 172.7 cm (68\")   Wt 113 kg (249 lb 6.4 oz)   BMI 37.92 kg/m²    Procedure:         Assessment:     Encounter Diagnoses   Name Primary?   • Flank pain with history of urolithiasis    • Frequency of urination Yes       Orders Placed This Encounter   Procedures   • CT Abdomen Pelvis Stone Protocol     Standing Status:   Future     Standing Expiration Date:   10/20/2020     Order Specific Question:   Patient Pregnant     Answer:   Unknown    "  Order Specific Question:   Will Oral Contrast be needed for this procedure?     Answer:   No   • POC Urinalysis Dipstick, Automated       Patient reports that she is not currently experiencing any symptoms of urinary incontinence.      Plan:   Discussed flank pain, lower back pain and her history of kidney stones.     Will order CT abdomen/pelvis stone protocol. Will see patient in one month and discuss her findings at that time.    Patient's Body mass index is 37.92 kg/m². BMI is above normal parameters. Recommendations include: educational material, exercise counseling and nutrition counseling.      Smoking Cessation Counseling:  Never a smoker.  Patient does not currently use any tobacco products.             Counseling was given to patient for the following topics impressions as follows: CT scan abdomen/pelvis. The interim medical history and current results were reviewed.  A treatment plan with follow-up was made for Flank Pain/ lower back paain Frequency of urination [R35.0].            This document has been electronically signed by Driss Wharton MD October 21, 2019 11:35 AM

## 2019-10-31 ENCOUNTER — HOSPITAL ENCOUNTER (OUTPATIENT)
Dept: CT IMAGING | Facility: HOSPITAL | Age: 34
Discharge: HOME | End: 2019-10-31
Admitting: NURSE PRACTITIONER

## 2019-10-31 DIAGNOSIS — Z87.442 FLANK PAIN WITH HISTORY OF UROLITHIASIS: ICD-10-CM

## 2019-10-31 DIAGNOSIS — R10.9 FLANK PAIN WITH HISTORY OF UROLITHIASIS: ICD-10-CM

## 2019-10-31 PROCEDURE — 74176 CT ABD & PELVIS W/O CONTRAST: CPT | Performed by: RADIOLOGY

## 2019-10-31 PROCEDURE — 74176 CT ABD & PELVIS W/O CONTRAST: CPT

## 2019-11-22 ENCOUNTER — OFFICE VISIT (OUTPATIENT)
Dept: UROLOGY | Facility: CLINIC | Age: 34
End: 2019-11-22

## 2019-11-22 VITALS
DIASTOLIC BLOOD PRESSURE: 62 MMHG | HEIGHT: 68 IN | BODY MASS INDEX: 38.28 KG/M2 | WEIGHT: 252.6 LBS | SYSTOLIC BLOOD PRESSURE: 126 MMHG

## 2019-11-22 DIAGNOSIS — Z87.898 HISTORY OF FLANK PAIN: Primary | ICD-10-CM

## 2019-11-22 PROCEDURE — 99213 OFFICE O/P EST LOW 20 MIN: CPT | Performed by: UROLOGY

## 2019-11-22 NOTE — PROGRESS NOTES
Chief Complaint:          Right Flank Pain    HPI:   34 y.o. female.    Pt is seen for Right flank pain. She is here to review here CT scan report for kidney stones.    She reports doing relatively well  with minimal discomfort. She has a history of stones, Lower back pain with sciatica and Lupus. She has generalized discomfort all the time with body aches.    She denies any urinary symptoms of frequency, urgency and dysuria. She has no gross hematuria, No N/V, no fever or chills.      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   • Dental bridge present    • Isabella filter in place     SINCE 2003   • History of transfusion    • History of unsteady gait     SINCE STROKE   • Hypertension    • Kidney stone    • Lupus (CMS/HCC)    • Migraine    • Seizures (CMS/HCC)     LAST ONE 2003   • Stroke (CMS/HCC)     2003         Current Meds:     Current Outpatient Medications   Medication Sig Dispense Refill   • APTIOM 800 MG tablet Take 800 mg by mouth 1 (One) Time.     • DULoxetine (CYMBALTA) 60 MG capsule Take 1 capsule by mouth Daily. 30 capsule 0   • fexofenadine (ALLEGRA ALLERGY) 180 MG tablet Take 1 tablet by mouth Daily. 30 tablet 5   • fluticasone (FLONASE) 50 MCG/ACT nasal spray 2 sprays into the nostril(s) as directed by provider Daily. 15.8 mL 2   • hydroxychloroquine (PLAQUENIL) 200 MG tablet Take 200 mg by mouth Daily.     • melatonin 5 MG tablet tablet Take 5 mg by mouth.     • nadolol (CORGARD) 40 MG tablet Take 20 mg by mouth Daily.     • nortriptyline (PAMELOR) 10 MG capsule Take 20 mg by mouth Every Night.     • pantoprazole (PROTONIX) 40 MG EC tablet Take 1 tablet by mouth Daily.     • promethazine (PHENERGAN) 25 MG tablet Take 25 mg by mouth Every 8 (Eight) Hours As Needed.     • raNITIdine (ZANTAC) 150 MG tablet Take 1 tablet by mouth Every 12 (Twelve) Hours.     • SUMAtriptan (IMITREX) 100 MG tablet Take  100 mg by mouth 1 (One) Time As Needed.     • tiZANidine (ZANAFLEX) 2 MG tablet Take 2 mg by mouth 3 (Three) Times a Day As Needed (TAKES 2 AT BED TIME).     • traMADol (ULTRAM) 50 MG tablet Take  by mouth 3 (Three) Times a Day As Needed.     • vitamin D (ERGOCALCIFEROL) 89092 units capsule capsule Take 50,000 Units by mouth 1 (One) Time Per Week.       No current facility-administered medications for this visit.         Allergies:      Allergies   Allergen Reactions   • Dilantin [Phenytoin]    • Phenytoin Sodium Extended         Past Surgical History:     Past Surgical History:   Procedure Laterality Date   • ABDOMINAL SURGERY     • EXTRACORPOREAL SHOCK WAVE LITHOTRIPSY (ESWL) Left 8/18/2017    Procedure: EXTRACORPOREAL SHOCKWAVE LITHOTRIPSY, FLEXIBLE URETEROSCOPY;  Surgeon: Driss Wharton MD;  Location: [facility];  Service:    • GALLBLADDER SURGERY     • KIDNEY STONE SURGERY           Social History:     Social History     Socioeconomic History   • Marital status: Single     Spouse name: Not on file   • Number of children: Not on file   • Years of education: Not on file   • Highest education level: Not on file   Tobacco Use   • Smoking status: Never Smoker   • Smokeless tobacco: Never Used   Substance and Sexual Activity   • Alcohol use: No   • Drug use: No   • Sexual activity: Defer     Birth control/protection: None       Family History:     Family History   Problem Relation Age of Onset   • Diabetes Father    • Thyroid disease Mother        Review of Systems:     Review of Systems   Constitutional: Negative.    HENT: Negative.    Eyes: Negative.    Respiratory: Negative.    Cardiovascular: Negative.    Gastrointestinal: Negative.    Endocrine: Negative.    Genitourinary: Negative.    Musculoskeletal: Positive for back pain (Lower back pain ) and neck pain. Negative for joint swelling and neck stiffness.   Skin: Negative.    Allergic/Immunologic: Negative.    Neurological: Negative.    Hematological:  "Negative.    Psychiatric/Behavioral: Negative.        I have reviewed the follow portions of the patient's history and confirmed they are accurate today:  allergies, current medications, past family history, past medical history and past social history  Physical Exam:     Physical Exam   Constitutional: She is oriented to person, place, and time. She appears well-developed and well-nourished. No distress.   HENT:   Head: Normocephalic and atraumatic.   Eyes: EOM are normal. Pupils are equal, round, and reactive to light. Right eye exhibits no discharge. Left eye exhibits no discharge. No scleral icterus.   Neck: Normal range of motion. Neck supple. No JVD present. No tracheal deviation present. No thyromegaly present.   Cardiovascular: Normal rate and regular rhythm. Exam reveals no gallop and no friction rub.   No murmur heard.  Pulmonary/Chest: Effort normal and breath sounds normal. No stridor. No respiratory distress. She has no wheezes. She has no rales. She exhibits no tenderness.   Abdominal: Soft. Bowel sounds are normal. She exhibits no distension and no mass. There is no tenderness. There is no guarding. No hernia.   Genitourinary: Vagina normal. No vaginal discharge found.   Musculoskeletal: She exhibits tenderness. She exhibits no edema or deformity.   Morbid obesity/lower back pain   Lymphadenopathy:     She has no cervical adenopathy.   Neurological: She is alert and oriented to person, place, and time. She displays normal reflexes. No cranial nerve deficit or sensory deficit. Coordination normal.   Skin: Skin is warm and dry. No rash noted. She is not diaphoretic. No erythema. No pallor.   Psychiatric: She has a normal mood and affect. Her behavior is normal. Judgment and thought content normal.       /62 (BP Location: Right arm, Patient Position: Sitting, Cuff Size: Adult)   Ht 172.7 cm (68\")   Wt 115 kg (252 lb 9.6 oz)   BMI 38.41 kg/m²    Procedure:         Assessment:     Encounter " Diagnosis   Name Primary?   • History of flank pain Yes       No orders of the defined types were placed in this encounter.      Patient reports that she is not currently experiencing any symptoms of urinary incontinence.      Plan:     Right Flank Pain: Patient reports doing relatively well. She is in no discomfort, no fever, chills, N/V. And has no urinary symptoms. We both reviewed her CT Scan report  Which shows the the previous right ureteral obstructing stones have passed. The are also nonstructing left kidney stones seen on this CT.     We have discussed the various parameters regarding spontaneous passage including the notion that a 1-3 mm stone has a  80 % high likelihood of spontaneous passage versus a larger stone being caught up in the upper areas of the urinary tract.      We discussed kidney stones prevention diet to include low sodium intake by avoiding things like smoked meats, canned products, increasing po fluid intake to at least 2-3 litres daily, not her Dr. Pepper she drinks daily, avoiding High calcium intake. She is to incorporate exercise in her daily regiment( a kidney stone prevention study guide has been given to patient.)    Will See her back in One year with a KUB .      Her ct scan shows the 2 mm calcification is in the renal parenchyma which is nephrocalcinosis and it should stay asyjmptomatic          Patient's Body mass index is 38.41 kg/m². BMI is above normal parameters. Recommendations include: educational material, exercise counseling and nutrition counseling.      Smoking Cessation Counseling:  Never a smoker.  Patient does not currently use any tobacco products.           Counseling was given to patient for the following topics diagnostic results including: non obstructing kidney stones left. The interim medical history and current results were reviewed.  A treatment plan with follow-up was made for History of flank pain [Z87.898].           This document has been  electronically signed by Driss Wharton MD November 22, 2019 9:35 AM

## 2020-01-30 ENCOUNTER — TRANSCRIBE ORDERS (OUTPATIENT)
Dept: ADMINISTRATIVE | Facility: HOSPITAL | Age: 35
End: 2020-01-30

## 2020-01-30 ENCOUNTER — APPOINTMENT (OUTPATIENT)
Dept: LAB | Facility: HOSPITAL | Age: 35
End: 2020-01-30

## 2020-01-30 DIAGNOSIS — M32.14: Primary | ICD-10-CM

## 2020-01-30 DIAGNOSIS — R31.29 HEMATURIA, MICROSCOPIC: ICD-10-CM

## 2020-01-30 PROCEDURE — 36415 COLL VENOUS BLD VENIPUNCTURE: CPT | Performed by: INTERNAL MEDICINE

## 2020-01-30 PROCEDURE — 81001 URINALYSIS AUTO W/SCOPE: CPT | Performed by: INTERNAL MEDICINE

## 2020-01-30 PROCEDURE — 80069 RENAL FUNCTION PANEL: CPT | Performed by: INTERNAL MEDICINE

## 2020-01-31 LAB
ALBUMIN SERPL-MCNC: 4.7 G/DL (ref 3.5–5.2)
ANION GAP SERPL CALCULATED.3IONS-SCNC: 15.5 MMOL/L (ref 5–15)
BACTERIA UR QL AUTO: ABNORMAL /HPF
BILIRUB UR QL STRIP: NEGATIVE
BUN BLD-MCNC: 10 MG/DL (ref 6–20)
BUN/CREAT SERPL: 14.5 (ref 7–25)
CALCIUM SPEC-SCNC: 9.2 MG/DL (ref 8.6–10.5)
CHLORIDE SERPL-SCNC: 102 MMOL/L (ref 98–107)
CLARITY UR: CLEAR
CO2 SERPL-SCNC: 19.5 MMOL/L (ref 22–29)
COLOR UR: YELLOW
CREAT BLD-MCNC: 0.69 MG/DL (ref 0.57–1)
GFR SERPL CREATININE-BSD FRML MDRD: 97 ML/MIN/1.73
GLUCOSE BLD-MCNC: 91 MG/DL (ref 65–99)
GLUCOSE UR STRIP-MCNC: NEGATIVE MG/DL
HGB UR QL STRIP.AUTO: ABNORMAL
HYALINE CASTS UR QL AUTO: ABNORMAL /LPF
KETONES UR QL STRIP: NEGATIVE
LEUKOCYTE ESTERASE UR QL STRIP.AUTO: NEGATIVE
NITRITE UR QL STRIP: NEGATIVE
PH UR STRIP.AUTO: 5.5 [PH] (ref 5–8)
PHOSPHATE SERPL-MCNC: 3.3 MG/DL (ref 2.5–4.5)
POTASSIUM BLD-SCNC: 4.4 MMOL/L (ref 3.5–5.2)
PROT UR QL STRIP: ABNORMAL
RBC # UR: ABNORMAL /HPF
REF LAB TEST METHOD: ABNORMAL
SODIUM BLD-SCNC: 137 MMOL/L (ref 136–145)
SP GR UR STRIP: 1.01 (ref 1–1.03)
SQUAMOUS #/AREA URNS HPF: ABNORMAL /HPF
UROBILINOGEN UR QL STRIP: ABNORMAL
WBC UR QL AUTO: ABNORMAL /HPF

## 2020-04-17 ENCOUNTER — OFFICE VISIT (OUTPATIENT)
Dept: FAMILY MEDICINE CLINIC | Facility: CLINIC | Age: 35
End: 2020-04-17

## 2020-04-17 DIAGNOSIS — H92.01 OTALGIA OF RIGHT EAR: ICD-10-CM

## 2020-04-17 DIAGNOSIS — K12.1 STOMATITIS: Primary | ICD-10-CM

## 2020-04-17 PROCEDURE — 99441 PR PHYS/QHP TELEPHONE EVALUATION 5-10 MIN: CPT | Performed by: FAMILY MEDICINE

## 2020-04-17 RX ORDER — AMOXICILLIN 875 MG/1
875 TABLET, COATED ORAL 2 TIMES DAILY
Qty: 14 TABLET | Refills: 0 | Status: SHIPPED | OUTPATIENT
Start: 2020-04-17 | End: 2020-04-24

## 2020-04-17 RX ORDER — NADOLOL 20 MG/1
TABLET ORAL
COMMUNITY
Start: 2020-04-14 | End: 2021-06-09

## 2020-04-17 NOTE — PROGRESS NOTES
Subjective   Livia Benítez is a 35 y.o. female.     History of Present Illness about a 1 week history of I think right-sided ear pain pressure.  Also a sore area on back of tongue left side.  Denies fever chills.  Denies cough.  Mild nausea no vomiting diarrhea.  Has not tried OTC.  Multiple medications as reconciled mostly from rheumatology clinic it seems.    The following portions of the patient's history were reviewed and updated as appropriate: allergies, current medications, past family history, past social history, past surgical history and problem list.    Review of Systems  See history of Present Illness     Objective     Physical Exam    PHQ-9 Total Score:      Patient's There is no height or weight on file to calculate BMI. BMI is above normal parameters. Recommendations include: exercise counseling and nutrition counseling.   (Normal BMI:  18.5-24.9, OW 25-29.9, Obesity 30 or greater)      Assessment/Plan     Livia was seen today for earache.    Diagnoses and all orders for this visit:    Stomatitis    Otalgia of right ear  -     amoxicillin (AMOXIL) 875 MG tablet; Take 1 tablet by mouth 2 (Two) Times a Day for 7 days.    In regards to symptoms you do present a compromised state because of your medications.  Will place on antibiotic and try to send down I mouthwash solution to help with the discomfort.  Continue all else same.  Keep follow-ups elsewhere.  Maintain good social distancing at this time.  Contact us/follow-up in near future if you do not see benefit over the next 5 to 7 days.         You have chosen to receive care through a telephone visit. Do you consent to use a telephone visit for your medical care today? Yes  This visit has been rescheduled as a phone visit to comply with patient safety concerns in accordance with CDC recommendations. Total time of discussion was 9 minutes.              This document has been electronically signed by Vitor Flores MD   April 17, 2020 13:55

## 2020-05-04 ENCOUNTER — TRANSCRIBE ORDERS (OUTPATIENT)
Dept: ADMINISTRATIVE | Facility: HOSPITAL | Age: 35
End: 2020-05-04

## 2020-05-04 ENCOUNTER — LAB (OUTPATIENT)
Dept: LAB | Facility: HOSPITAL | Age: 35
End: 2020-05-04

## 2020-05-04 DIAGNOSIS — M32.9 SYSTEMIC LUPUS ERYTHEMATOSUS, UNSPECIFIED SLE TYPE, UNSPECIFIED ORGAN INVOLVEMENT STATUS (HCC): ICD-10-CM

## 2020-05-04 DIAGNOSIS — E55.9 AVITAMINOSIS D: ICD-10-CM

## 2020-05-04 DIAGNOSIS — Z79.899 ENCOUNTER FOR LONG-TERM (CURRENT) USE OF OTHER MEDICATIONS: ICD-10-CM

## 2020-05-04 DIAGNOSIS — E55.9 AVITAMINOSIS D: Primary | ICD-10-CM

## 2020-05-04 PROCEDURE — 80053 COMPREHEN METABOLIC PANEL: CPT

## 2020-05-04 PROCEDURE — 86160 COMPLEMENT ANTIGEN: CPT

## 2020-05-04 PROCEDURE — 85025 COMPLETE CBC W/AUTO DIFF WBC: CPT

## 2020-05-04 PROCEDURE — 81001 URINALYSIS AUTO W/SCOPE: CPT

## 2020-05-04 PROCEDURE — 86225 DNA ANTIBODY NATIVE: CPT

## 2020-05-04 PROCEDURE — 36415 COLL VENOUS BLD VENIPUNCTURE: CPT

## 2020-05-04 PROCEDURE — 86140 C-REACTIVE PROTEIN: CPT

## 2020-05-04 PROCEDURE — 85652 RBC SED RATE AUTOMATED: CPT

## 2020-05-05 LAB
ALBUMIN SERPL-MCNC: 4.3 G/DL (ref 3.5–5.2)
ALBUMIN/GLOB SERPL: 1.6 G/DL
ALP SERPL-CCNC: 57 U/L (ref 39–117)
ALT SERPL W P-5'-P-CCNC: 10 U/L (ref 1–33)
ANION GAP SERPL CALCULATED.3IONS-SCNC: 12.4 MMOL/L (ref 5–15)
AST SERPL-CCNC: 14 U/L (ref 1–32)
BACTERIA UR QL AUTO: ABNORMAL /HPF
BASOPHILS # BLD AUTO: 0.02 10*3/MM3 (ref 0–0.2)
BASOPHILS NFR BLD AUTO: 0.4 % (ref 0–1.5)
BILIRUB SERPL-MCNC: 0.2 MG/DL (ref 0.2–1.2)
BILIRUB UR QL STRIP: NEGATIVE
BUN BLD-MCNC: 12 MG/DL (ref 6–20)
BUN/CREAT SERPL: 22.6 (ref 7–25)
CALCIUM SPEC-SCNC: 9 MG/DL (ref 8.6–10.5)
CHLORIDE SERPL-SCNC: 104 MMOL/L (ref 98–107)
CLARITY UR: CLEAR
CO2 SERPL-SCNC: 22.6 MMOL/L (ref 22–29)
COLOR UR: YELLOW
CREAT BLD-MCNC: 0.53 MG/DL (ref 0.57–1)
CRP SERPL-MCNC: 0.14 MG/DL (ref 0–0.5)
DEPRECATED RDW RBC AUTO: 43.4 FL (ref 37–54)
EOSINOPHIL # BLD AUTO: 0.23 10*3/MM3 (ref 0–0.4)
EOSINOPHIL NFR BLD AUTO: 4 % (ref 0.3–6.2)
ERYTHROCYTE [DISTWIDTH] IN BLOOD BY AUTOMATED COUNT: 13.9 % (ref 12.3–15.4)
GFR SERPL CREATININE-BSD FRML MDRD: 131 ML/MIN/1.73
GLOBULIN UR ELPH-MCNC: 2.7 GM/DL
GLUCOSE BLD-MCNC: 106 MG/DL (ref 65–99)
GLUCOSE UR STRIP-MCNC: NEGATIVE MG/DL
HCT VFR BLD AUTO: 40.2 % (ref 34–46.6)
HGB BLD-MCNC: 13.1 G/DL (ref 12–15.9)
HGB UR QL STRIP.AUTO: NEGATIVE
HYALINE CASTS UR QL AUTO: ABNORMAL /LPF
IMM GRANULOCYTES # BLD AUTO: 0.01 10*3/MM3 (ref 0–0.05)
IMM GRANULOCYTES NFR BLD AUTO: 0.2 % (ref 0–0.5)
KETONES UR QL STRIP: NEGATIVE
LEUKOCYTE ESTERASE UR QL STRIP.AUTO: NEGATIVE
LYMPHOCYTES # BLD AUTO: 2.62 10*3/MM3 (ref 0.7–3.1)
LYMPHOCYTES NFR BLD AUTO: 45.9 % (ref 19.6–45.3)
MCH RBC QN AUTO: 27.8 PG (ref 26.6–33)
MCHC RBC AUTO-ENTMCNC: 32.6 G/DL (ref 31.5–35.7)
MCV RBC AUTO: 85.2 FL (ref 79–97)
MONOCYTES # BLD AUTO: 0.53 10*3/MM3 (ref 0.1–0.9)
MONOCYTES NFR BLD AUTO: 9.3 % (ref 5–12)
NEUTROPHILS # BLD AUTO: 2.3 10*3/MM3 (ref 1.7–7)
NEUTROPHILS NFR BLD AUTO: 40.2 % (ref 42.7–76)
NITRITE UR QL STRIP: NEGATIVE
NRBC BLD AUTO-RTO: 0 /100 WBC (ref 0–0.2)
PH UR STRIP.AUTO: 6 [PH] (ref 5–8)
PLATELET # BLD AUTO: 200 10*3/MM3 (ref 140–450)
PMV BLD AUTO: 10.8 FL (ref 6–12)
POTASSIUM BLD-SCNC: 4.2 MMOL/L (ref 3.5–5.2)
PROT SERPL-MCNC: 7 G/DL (ref 6–8.5)
PROT UR QL STRIP: ABNORMAL
RBC # BLD AUTO: 4.72 10*6/MM3 (ref 3.77–5.28)
RBC # UR: ABNORMAL /HPF
REF LAB TEST METHOD: ABNORMAL
SODIUM BLD-SCNC: 139 MMOL/L (ref 136–145)
SP GR UR STRIP: 1.02 (ref 1–1.03)
SQUAMOUS #/AREA URNS HPF: ABNORMAL /HPF
UROBILINOGEN UR QL STRIP: ABNORMAL
WBC NRBC COR # BLD: 5.71 10*3/MM3 (ref 3.4–10.8)
WBC UR QL AUTO: ABNORMAL /HPF

## 2020-05-06 LAB
C3 SERPL-MCNC: 132 MG/DL (ref 82–167)
C4 SERPL-MCNC: 23 MG/DL (ref 14–44)

## 2020-05-08 LAB — ERYTHROCYTE [SEDIMENTATION RATE] IN BLOOD: 4 MM/HR (ref 0–20)

## 2020-05-11 LAB
DSDNA AB SER QL CLIF: POSITIVE
DSDNA AB TITR SER CLIF: ABNORMAL {TITER}

## 2020-06-01 ENCOUNTER — OFFICE VISIT (OUTPATIENT)
Dept: FAMILY MEDICINE CLINIC | Facility: CLINIC | Age: 35
End: 2020-06-01

## 2020-06-01 VITALS
BODY MASS INDEX: 37.95 KG/M2 | WEIGHT: 250.4 LBS | HEART RATE: 127 BPM | DIASTOLIC BLOOD PRESSURE: 90 MMHG | SYSTOLIC BLOOD PRESSURE: 126 MMHG | OXYGEN SATURATION: 98 % | HEIGHT: 68 IN | TEMPERATURE: 97.1 F

## 2020-06-01 DIAGNOSIS — J30.2 SEASONAL ALLERGIC RHINITIS, UNSPECIFIED TRIGGER: ICD-10-CM

## 2020-06-01 DIAGNOSIS — H65.193 OTHER NON-RECURRENT ACUTE NONSUPPURATIVE OTITIS MEDIA OF BOTH EARS: Primary | ICD-10-CM

## 2020-06-01 PROCEDURE — 99213 OFFICE O/P EST LOW 20 MIN: CPT | Performed by: NURSE PRACTITIONER

## 2020-06-01 RX ORDER — FLUTICASONE PROPIONATE 50 MCG
2 SPRAY, SUSPENSION (ML) NASAL DAILY
Qty: 15.8 ML | Refills: 2 | Status: SHIPPED | OUTPATIENT
Start: 2020-06-01 | End: 2021-04-12

## 2020-06-01 RX ORDER — FEXOFENADINE HCL 180 MG/1
180 TABLET ORAL DAILY
Qty: 30 TABLET | Refills: 5 | Status: SHIPPED | OUTPATIENT
Start: 2020-06-01 | End: 2020-12-10

## 2020-06-01 RX ORDER — AMOXICILLIN AND CLAVULANATE POTASSIUM 875; 125 MG/1; MG/1
1 TABLET, FILM COATED ORAL 2 TIMES DAILY
Qty: 20 TABLET | Refills: 0 | Status: SHIPPED | OUTPATIENT
Start: 2020-06-01 | End: 2020-06-11

## 2020-06-01 NOTE — PROGRESS NOTES
"Milind Benítez is a 35 y.o. female.     Chief Complaint   Patient presents with   • Allergies       She presents with c/o sore throat, earache, and runny nose for the past week and a half. She was taking benadryl but it doesn't seem to be helping. She is not taking allegra or fexofenadine. She denies fever and chills. She denies ill contacts.        The following portions of the patient's history were reviewed and updated as appropriate: allergies, current medications, past family history, past medical history, past social history, past surgical history and problem list.    Review of Systems   Constitutional: Negative for fatigue, fever and unexpected weight change.   HENT: Positive for ear pain, rhinorrhea and sore throat.    Eyes: Negative for visual disturbance.   Respiratory: Positive for cough. Negative for chest tightness, shortness of breath and wheezing.    Cardiovascular: Negative for chest pain, palpitations and leg swelling.   Gastrointestinal: Positive for nausea (chronic). Negative for abdominal pain, blood in stool, constipation, diarrhea and vomiting.   Endocrine: Negative for cold intolerance and heat intolerance.   Genitourinary: Negative for dysuria and hematuria.   Musculoskeletal: Positive for arthralgias (chronic). Negative for myalgias.   Skin: Negative for color change.   Allergic/Immunologic: Positive for environmental allergies.   Neurological: Positive for headaches (chronic). Negative for dizziness, seizures and syncope.   Hematological: Negative for adenopathy.   Psychiatric/Behavioral: Negative for suicidal ideas. The patient is not nervous/anxious.        Objective     /90 (BP Location: Right arm, Patient Position: Sitting, Cuff Size: Adult)   Pulse (!) 127   Temp 97.1 °F (36.2 °C) (Temporal)   Ht 172.7 cm (67.99\")   Wt 114 kg (250 lb 6.4 oz)   SpO2 98%   BMI 38.08 kg/m²     Physical Exam   Constitutional: She is oriented to person, place, and time. Vital signs " are normal. She appears well-developed and well-nourished. No distress.   HENT:   Head: Normocephalic and atraumatic.   Right Ear: Hearing, external ear and ear canal normal. Tympanic membrane is erythematous.   Left Ear: Hearing, external ear and ear canal normal. Tympanic membrane is erythematous.   Nose: Nose normal. Right sinus exhibits no maxillary sinus tenderness and no frontal sinus tenderness. Left sinus exhibits no maxillary sinus tenderness and no frontal sinus tenderness.   Mouth/Throat: Uvula is midline, oropharynx is clear and moist and mucous membranes are normal.   Eyes: Pupils are equal, round, and reactive to light. Conjunctivae, EOM and lids are normal.   Neck: Trachea normal. No tracheal tenderness present. No tracheal deviation present.   Cardiovascular: Normal rate, regular rhythm, S1 normal, S2 normal, normal heart sounds and intact distal pulses. Exam reveals no gallop and no friction rub.   No murmur heard.  Pulmonary/Chest: Effort normal and breath sounds normal. No respiratory distress.   Abdominal: Soft. Normal appearance and bowel sounds are normal. She exhibits no distension. There is no tenderness.   Musculoskeletal: She exhibits no edema.   Lymphadenopathy:     She has no cervical adenopathy.   Neurological: She is alert and oriented to person, place, and time.   Skin: Skin is warm and dry. She is not diaphoretic.   Psychiatric: She has a normal mood and affect. Her behavior is normal. Judgment and thought content normal.       Assessment/Plan     Problem List Items Addressed This Visit     None      Visit Diagnoses     Other non-recurrent acute nonsuppurative otitis media of both ears    -  Primary    Relevant Medications    amoxicillin-clavulanate (Augmentin) 875-125 MG per tablet    Seasonal allergic rhinitis, unspecified trigger        Relevant Medications    fluticasone (Flonase) 50 MCG/ACT nasal spray    fexofenadine (Allegra Allergy) 180 MG tablet          Plan: Augmentin  ordered for otitis media. Resume fexofenadine and flonase for allergies. Self quarantine. Follow up as needed.     Patient's Body mass index is 38.08 kg/m². BMI is above normal parameters. Recommendations include: educational material.   (Normal BMI:  18.5-24.9, OW 25-29.9, Obesity 30 or greater)             Understands disease processes and need for medications.  Understands reasons for urgent and emergent care.  Patient (& family) verbalized agreement for treatment plan.   Emotional support and active listening provided.  Patient provided time to verbalize feelings.               This document has been electronically signed by DANIEL Restrepo   June 1, 2020 11:53

## 2020-06-01 NOTE — PATIENT INSTRUCTIONS

## 2020-07-06 ENCOUNTER — OFFICE VISIT (OUTPATIENT)
Dept: FAMILY MEDICINE CLINIC | Facility: CLINIC | Age: 35
End: 2020-07-06

## 2020-07-06 ENCOUNTER — LAB (OUTPATIENT)
Dept: LAB | Facility: HOSPITAL | Age: 35
End: 2020-07-06

## 2020-07-06 DIAGNOSIS — M54.50 ACUTE BILATERAL LOW BACK PAIN WITHOUT SCIATICA: ICD-10-CM

## 2020-07-06 DIAGNOSIS — J02.9 SORE THROAT: Primary | ICD-10-CM

## 2020-07-06 DIAGNOSIS — J02.9 SORE THROAT: ICD-10-CM

## 2020-07-06 LAB
FLUAV AG NPH QL: NEGATIVE
FLUBV AG NPH QL IA: NEGATIVE
S PYO AG THROAT QL: NEGATIVE

## 2020-07-06 PROCEDURE — 81003 URINALYSIS AUTO W/O SCOPE: CPT | Performed by: NURSE PRACTITIONER

## 2020-07-06 PROCEDURE — 36415 COLL VENOUS BLD VENIPUNCTURE: CPT | Performed by: NURSE PRACTITIONER

## 2020-07-06 PROCEDURE — 87081 CULTURE SCREEN ONLY: CPT

## 2020-07-06 PROCEDURE — 99441 PR PHYS/QHP TELEPHONE EVALUATION 5-10 MIN: CPT | Performed by: NURSE PRACTITIONER

## 2020-07-06 PROCEDURE — 87880 STREP A ASSAY W/OPTIC: CPT

## 2020-07-06 PROCEDURE — 80053 COMPREHEN METABOLIC PANEL: CPT | Performed by: NURSE PRACTITIONER

## 2020-07-06 PROCEDURE — U0003 INFECTIOUS AGENT DETECTION BY NUCLEIC ACID (DNA OR RNA); SEVERE ACUTE RESPIRATORY SYNDROME CORONAVIRUS 2 (SARS-COV-2) (CORONAVIRUS DISEASE [COVID-19]), AMPLIFIED PROBE TECHNIQUE, MAKING USE OF HIGH THROUGHPUT TECHNOLOGIES AS DESCRIBED BY CMS-2020-01-R: HCPCS

## 2020-07-06 PROCEDURE — 85025 COMPLETE CBC W/AUTO DIFF WBC: CPT | Performed by: NURSE PRACTITIONER

## 2020-07-06 PROCEDURE — 87804 INFLUENZA ASSAY W/OPTIC: CPT

## 2020-07-06 RX ORDER — CEFDINIR 300 MG/1
300 CAPSULE ORAL DAILY
Qty: 20 CAPSULE | Refills: 0 | Status: SHIPPED | OUTPATIENT
Start: 2020-07-06 | End: 2020-12-01

## 2020-07-06 RX ORDER — FLUCONAZOLE 100 MG/1
100 TABLET ORAL DAILY
Qty: 3 TABLET | Refills: 0 | Status: SHIPPED | OUTPATIENT
Start: 2020-07-06 | End: 2021-04-12

## 2020-07-06 NOTE — PROGRESS NOTES
Milind Benítez is a 35 y.o. female.     Chief Complaint   Patient presents with   • Sore Throat       She presents with c/o sore throat that started Friday evening. She states her sore throat is better but it feels sore on the outside like she has been punched. She sharp pain on the right side of her back. She has a history of kidney stones. She states there is pain there but not like it is on her right side. She denies fever and chills. She c/o getting really got and getting dizzy. She has not taken any medicine. She denies dysuria and blood in the urine.        The following portions of the patient's history were reviewed and updated as appropriate: allergies, current medications, past family history, past medical history, past social history, past surgical history and problem list.    Review of Systems   Constitutional: Negative for fatigue, fever and unexpected weight change.   HENT: Positive for ear pain (right ear) and sore throat. Negative for rhinorrhea.    Eyes: Negative for visual disturbance.   Respiratory: Positive for cough. Negative for chest tightness, shortness of breath and wheezing.    Cardiovascular: Negative for chest pain, palpitations and leg swelling.   Gastrointestinal: Positive for nausea. Negative for abdominal pain, blood in stool, constipation, diarrhea and vomiting.   Endocrine: Negative for cold intolerance and heat intolerance.   Genitourinary: Negative for dysuria and hematuria.   Musculoskeletal: Positive for arthralgias, back pain and myalgias.   Skin: Negative for color change.   Allergic/Immunologic: Negative for environmental allergies.   Neurological: Positive for dizziness and headaches.   Hematological: Negative for adenopathy.   Psychiatric/Behavioral: Negative for suicidal ideas. The patient is not nervous/anxious.        Objective     There were no vitals taken for this visit.    Physical Exam   Constitutional: She is oriented to person, place, and time.      Neurological: She is alert and oriented to person, place, and time.       Assessment/Plan     Problem List Items Addressed This Visit     None      Visit Diagnoses     Sore throat    -  Primary    Relevant Medications    cefdinir (OMNICEF) 300 MG capsule    fluconazole (Diflucan) 100 MG tablet    Other Relevant Orders    COVID-19,[facility] ROUTINE, NP/OP SWAB IN TRANSPORT MEDIA OR ESWAB 72 HR TAT - Swab, Nasopharynx    Beta Strep Culture, Throat - , Throat    Influenza A & B PCR - Swab, Nasopharynx    CBC & Differential    Comprehensive Metabolic Panel    Urinalysis With Culture If Indicated - Urine, Clean Catch    Acute bilateral low back pain without sciatica        Relevant Orders    CBC & Differential    Comprehensive Metabolic Panel    Urinalysis With Culture If Indicated - Urine, Clean Catch          Plan: Screen for COVID, strep, and flu. Treat for strep with cefdinir. Rest, drink lots of fluids. Get cbc, cmp, an urinalysis to look for cause of back pain. Follow up pending results. Self quarantine for two weeks. Visit last 10 minutes.      There is no height or weight on file to calculate BMI.     You have chosen to receive care through a telephone visit. Do you consent to use a telephone visit for your medical care today? Yes     This visit has been rescheduled as a phone visit to comply with patient safety concerns in accordance with CDC recommendations. Total time of discussion was 10 minutes.        Understands disease processes and need for medications.  Understands reasons for urgent and emergent care.  Patient (& family) verbalized agreement for treatment plan.   Emotional support and active listening provided.  Patient provided time to verbalize feelings.               This document has been electronically signed by DANIEL Restrepo   July 6, 2020 12:37

## 2020-07-07 LAB
ALBUMIN SERPL-MCNC: 4.7 G/DL (ref 3.5–5.2)
ALBUMIN/GLOB SERPL: 1.7 G/DL
ALP SERPL-CCNC: 61 U/L (ref 39–117)
ALT SERPL W P-5'-P-CCNC: 12 U/L (ref 1–33)
ANION GAP SERPL CALCULATED.3IONS-SCNC: 11.5 MMOL/L (ref 5–15)
AST SERPL-CCNC: 14 U/L (ref 1–32)
BASOPHILS # BLD AUTO: 0.03 10*3/MM3 (ref 0–0.2)
BASOPHILS NFR BLD AUTO: 0.4 % (ref 0–1.5)
BILIRUB SERPL-MCNC: 0.2 MG/DL (ref 0–1.2)
BILIRUB UR QL STRIP: NEGATIVE
BUN SERPL-MCNC: 13 MG/DL (ref 6–20)
BUN/CREAT SERPL: 20.3 (ref 7–25)
CALCIUM SPEC-SCNC: 9.6 MG/DL (ref 8.6–10.5)
CHLORIDE SERPL-SCNC: 100 MMOL/L (ref 98–107)
CLARITY UR: ABNORMAL
CO2 SERPL-SCNC: 24.5 MMOL/L (ref 22–29)
COLOR UR: YELLOW
CREAT SERPL-MCNC: 0.64 MG/DL (ref 0.57–1)
DEPRECATED RDW RBC AUTO: 44.9 FL (ref 37–54)
EOSINOPHIL # BLD AUTO: 0.15 10*3/MM3 (ref 0–0.4)
EOSINOPHIL NFR BLD AUTO: 2 % (ref 0.3–6.2)
ERYTHROCYTE [DISTWIDTH] IN BLOOD BY AUTOMATED COUNT: 13.6 % (ref 12.3–15.4)
GFR SERPL CREATININE-BSD FRML MDRD: 105 ML/MIN/1.73
GLOBULIN UR ELPH-MCNC: 2.7 GM/DL
GLUCOSE SERPL-MCNC: 86 MG/DL (ref 65–99)
GLUCOSE UR STRIP-MCNC: NEGATIVE MG/DL
HCT VFR BLD AUTO: 42.8 % (ref 34–46.6)
HGB BLD-MCNC: 13.7 G/DL (ref 12–15.9)
HGB UR QL STRIP.AUTO: NEGATIVE
IMM GRANULOCYTES # BLD AUTO: 0.02 10*3/MM3 (ref 0–0.05)
IMM GRANULOCYTES NFR BLD AUTO: 0.3 % (ref 0–0.5)
KETONES UR QL STRIP: NEGATIVE
LEUKOCYTE ESTERASE UR QL STRIP.AUTO: NEGATIVE
LYMPHOCYTES # BLD AUTO: 3 10*3/MM3 (ref 0.7–3.1)
LYMPHOCYTES NFR BLD AUTO: 39.3 % (ref 19.6–45.3)
MCH RBC QN AUTO: 28 PG (ref 26.6–33)
MCHC RBC AUTO-ENTMCNC: 32 G/DL (ref 31.5–35.7)
MCV RBC AUTO: 87.3 FL (ref 79–97)
MONOCYTES # BLD AUTO: 0.67 10*3/MM3 (ref 0.1–0.9)
MONOCYTES NFR BLD AUTO: 8.8 % (ref 5–12)
NEUTROPHILS NFR BLD AUTO: 3.77 10*3/MM3 (ref 1.7–7)
NEUTROPHILS NFR BLD AUTO: 49.2 % (ref 42.7–76)
NITRITE UR QL STRIP: NEGATIVE
NRBC BLD AUTO-RTO: 0 /100 WBC (ref 0–0.2)
PH UR STRIP.AUTO: <=5 [PH] (ref 5–8)
PLATELET # BLD AUTO: 193 10*3/MM3 (ref 140–450)
PMV BLD AUTO: 10.8 FL (ref 6–12)
POTASSIUM SERPL-SCNC: 4.2 MMOL/L (ref 3.5–5.2)
PROT SERPL-MCNC: 7.4 G/DL (ref 6–8.5)
PROT UR QL STRIP: ABNORMAL
RBC # BLD AUTO: 4.9 10*6/MM3 (ref 3.77–5.28)
SODIUM SERPL-SCNC: 136 MMOL/L (ref 136–145)
SP GR UR STRIP: >=1.03 (ref 1–1.03)
UROBILINOGEN UR QL STRIP: ABNORMAL
WBC # BLD AUTO: 7.64 10*3/MM3 (ref 3.4–10.8)

## 2020-07-08 LAB — BACTERIA SPEC AEROBE CULT: NORMAL

## 2020-07-09 LAB — SARS-COV-2 RNA RESP QL NAA+PROBE: NOT DETECTED

## 2020-09-01 ENCOUNTER — LAB (OUTPATIENT)
Dept: LAB | Facility: HOSPITAL | Age: 35
End: 2020-09-01

## 2020-09-01 ENCOUNTER — TRANSCRIBE ORDERS (OUTPATIENT)
Dept: ADMINISTRATIVE | Facility: HOSPITAL | Age: 35
End: 2020-09-01

## 2020-09-01 DIAGNOSIS — E55.9 VITAMIN D DEFICIENCY: Primary | ICD-10-CM

## 2020-09-01 DIAGNOSIS — Z79.899 OTHER LONG TERM (CURRENT) DRUG THERAPY: ICD-10-CM

## 2020-09-01 DIAGNOSIS — R11.0 NAUSEA: ICD-10-CM

## 2020-09-01 DIAGNOSIS — R53.83 OTHER FATIGUE: ICD-10-CM

## 2020-09-01 DIAGNOSIS — M32.9 SYSTEMIC LUPUS ERYTHEMATOSUS, UNSPECIFIED SLE TYPE, UNSPECIFIED ORGAN INVOLVEMENT STATUS (HCC): ICD-10-CM

## 2020-09-01 DIAGNOSIS — E55.9 VITAMIN D DEFICIENCY: ICD-10-CM

## 2020-09-01 PROCEDURE — 86225 DNA ANTIBODY NATIVE: CPT

## 2020-09-01 PROCEDURE — 81003 URINALYSIS AUTO W/O SCOPE: CPT

## 2020-09-01 PROCEDURE — 82248 BILIRUBIN DIRECT: CPT

## 2020-09-01 PROCEDURE — 86160 COMPLEMENT ANTIGEN: CPT

## 2020-09-01 PROCEDURE — 82306 VITAMIN D 25 HYDROXY: CPT

## 2020-09-01 PROCEDURE — 86255 FLUORESCENT ANTIBODY SCREEN: CPT

## 2020-09-01 PROCEDURE — 80053 COMPREHEN METABOLIC PANEL: CPT

## 2020-09-01 PROCEDURE — 82746 ASSAY OF FOLIC ACID SERUM: CPT

## 2020-09-01 PROCEDURE — 82607 VITAMIN B-12: CPT

## 2020-09-01 PROCEDURE — 86677 HELICOBACTER PYLORI ANTIBODY: CPT

## 2020-09-01 PROCEDURE — 85025 COMPLETE CBC W/AUTO DIFF WBC: CPT

## 2020-09-01 PROCEDURE — 36415 COLL VENOUS BLD VENIPUNCTURE: CPT

## 2020-09-01 PROCEDURE — 86140 C-REACTIVE PROTEIN: CPT

## 2020-09-01 PROCEDURE — 85652 RBC SED RATE AUTOMATED: CPT

## 2020-09-02 LAB
25(OH)D3 SERPL-MCNC: 29.7 NG/ML (ref 30–100)
ALBUMIN SERPL-MCNC: 4.3 G/DL (ref 3.5–5.2)
ALBUMIN/GLOB SERPL: 1.4 G/DL
ALP SERPL-CCNC: 55 U/L (ref 39–117)
ALT SERPL W P-5'-P-CCNC: 13 U/L (ref 1–33)
ANION GAP SERPL CALCULATED.3IONS-SCNC: 10 MMOL/L (ref 5–15)
AST SERPL-CCNC: 13 U/L (ref 1–32)
BASOPHILS # BLD AUTO: 0.01 10*3/MM3 (ref 0–0.2)
BASOPHILS NFR BLD AUTO: 0.2 % (ref 0–1.5)
BILIRUB CONJ SERPL-MCNC: <0.2 MG/DL (ref 0–0.3)
BILIRUB SERPL-MCNC: 0.2 MG/DL (ref 0–1.2)
BILIRUB UR QL STRIP: NEGATIVE
BUN SERPL-MCNC: 14 MG/DL (ref 6–20)
BUN/CREAT SERPL: 21.2 (ref 7–25)
C3 SERPL-MCNC: 139 MG/DL (ref 82–167)
C4 SERPL-MCNC: 22 MG/DL (ref 14–44)
CALCIUM SPEC-SCNC: 9.6 MG/DL (ref 8.6–10.5)
CHLORIDE SERPL-SCNC: 104 MMOL/L (ref 98–107)
CLARITY UR: CLEAR
CO2 SERPL-SCNC: 23 MMOL/L (ref 22–29)
COLOR UR: YELLOW
CREAT SERPL-MCNC: 0.66 MG/DL (ref 0.57–1)
CRP SERPL-MCNC: 0.23 MG/DL (ref 0–0.5)
DEPRECATED RDW RBC AUTO: 45.4 FL (ref 37–54)
DSDNA AB SER-ACNC: 9 IU/ML (ref 0–9)
EOSINOPHIL # BLD AUTO: 0.14 10*3/MM3 (ref 0–0.4)
EOSINOPHIL NFR BLD AUTO: 2.7 % (ref 0.3–6.2)
ERYTHROCYTE [DISTWIDTH] IN BLOOD BY AUTOMATED COUNT: 14.2 % (ref 12.3–15.4)
ERYTHROCYTE [SEDIMENTATION RATE] IN BLOOD: 6 MM/HR (ref 0–20)
FOLATE SERPL-MCNC: 13.5 NG/ML (ref 4.78–24.2)
GFR SERPL CREATININE-BSD FRML MDRD: 101 ML/MIN/1.73
GLOBULIN UR ELPH-MCNC: 3.1 GM/DL
GLUCOSE SERPL-MCNC: 86 MG/DL (ref 65–99)
GLUCOSE UR STRIP-MCNC: NEGATIVE MG/DL
H PYLORI IGA SER IA-ACNC: <9 UNITS (ref 0–8.9)
H PYLORI IGG SER IA-ACNC: 0.32 INDEX VALUE (ref 0–0.79)
H PYLORI IGM SER-ACNC: <9 UNITS (ref 0–8.9)
HCT VFR BLD AUTO: 39.9 % (ref 34–46.6)
HGB BLD-MCNC: 12.7 G/DL (ref 12–15.9)
HGB UR QL STRIP.AUTO: NEGATIVE
IMM GRANULOCYTES # BLD AUTO: 0 10*3/MM3 (ref 0–0.05)
IMM GRANULOCYTES NFR BLD AUTO: 0 % (ref 0–0.5)
KETONES UR QL STRIP: NEGATIVE
LEUKOCYTE ESTERASE UR QL STRIP.AUTO: NEGATIVE
LYMPHOCYTES # BLD AUTO: 2.56 10*3/MM3 (ref 0.7–3.1)
LYMPHOCYTES NFR BLD AUTO: 49.6 % (ref 19.6–45.3)
MCH RBC QN AUTO: 27.7 PG (ref 26.6–33)
MCHC RBC AUTO-ENTMCNC: 31.8 G/DL (ref 31.5–35.7)
MCV RBC AUTO: 86.9 FL (ref 79–97)
MONOCYTES # BLD AUTO: 0.37 10*3/MM3 (ref 0.1–0.9)
MONOCYTES NFR BLD AUTO: 7.2 % (ref 5–12)
NEUTROPHILS NFR BLD AUTO: 2.08 10*3/MM3 (ref 1.7–7)
NEUTROPHILS NFR BLD AUTO: 40.3 % (ref 42.7–76)
NITRITE UR QL STRIP: NEGATIVE
NRBC BLD AUTO-RTO: 0 /100 WBC (ref 0–0.2)
PH UR STRIP.AUTO: 6.5 [PH] (ref 5–8)
PLATELET # BLD AUTO: 215 10*3/MM3 (ref 140–450)
PMV BLD AUTO: 10.9 FL (ref 6–12)
POTASSIUM SERPL-SCNC: 4.1 MMOL/L (ref 3.5–5.2)
PROT SERPL-MCNC: 7.4 G/DL (ref 6–8.5)
PROT UR QL STRIP: ABNORMAL
RBC # BLD AUTO: 4.59 10*6/MM3 (ref 3.77–5.28)
SODIUM SERPL-SCNC: 137 MMOL/L (ref 136–145)
SP GR UR STRIP: 1.03 (ref 1–1.03)
UROBILINOGEN UR QL STRIP: ABNORMAL
VIT B12 BLD-MCNC: 580 PG/ML (ref 211–946)
WBC # BLD AUTO: 5.16 10*3/MM3 (ref 3.4–10.8)

## 2020-09-03 LAB
DSDNA AB SER QL CLIF: POSITIVE
DSDNA AB TITR SER CLIF: ABNORMAL {TITER}

## 2020-11-25 ENCOUNTER — TELEPHONE (OUTPATIENT)
Dept: FAMILY MEDICINE CLINIC | Facility: CLINIC | Age: 35
End: 2020-11-25

## 2020-11-25 NOTE — TELEPHONE ENCOUNTER
Called patient and let her know that there is no  In office today and if she was very worried about it she could go to urgent care.

## 2020-11-25 NOTE — TELEPHONE ENCOUNTER
PLACE ON STOMACH LOOKS LIKE A BURN, HAS PUS LIKE DRAINAGE, YELLOW, SKIN AROUND IT LOOKS RED SMALL AMOUNT OF DRAINAGE. SHE HAS BEEN USING [employer] OINTMENT ON IT WITH A BANDAGE. PATIENT USES [facility]. SHE IS SCHEDULED FOR A VISIT ON Monday.

## 2020-11-25 NOTE — TELEPHONE ENCOUNTER
PATIENT WAS CALLING BACK TO CHECK IF THERE WAS AN UPDATE. SHE IS ASKING TO BE CONTACTED BACK WITH DIRECTIVES.     CONTACT: [phone]

## 2020-12-01 ENCOUNTER — HOSPITAL ENCOUNTER (OUTPATIENT)
Dept: GENERAL RADIOLOGY | Facility: HOSPITAL | Age: 35
Discharge: HOME | End: 2020-12-01
Admitting: NURSE PRACTITIONER

## 2020-12-01 ENCOUNTER — OFFICE VISIT (OUTPATIENT)
Dept: UROLOGY | Facility: CLINIC | Age: 35
End: 2020-12-01

## 2020-12-01 VITALS — BODY MASS INDEX: 40.77 KG/M2 | HEIGHT: 68 IN | TEMPERATURE: 96.8 F | WEIGHT: 269 LBS

## 2020-12-01 DIAGNOSIS — R35.0 FREQUENCY OF URINATION: ICD-10-CM

## 2020-12-01 DIAGNOSIS — R10.9 FLANK PAIN WITH HISTORY OF UROLITHIASIS: Primary | ICD-10-CM

## 2020-12-01 DIAGNOSIS — Z87.442 FLANK PAIN WITH HISTORY OF UROLITHIASIS: Primary | ICD-10-CM

## 2020-12-01 DIAGNOSIS — R10.84 GENERALIZED ABDOMINAL PAIN: ICD-10-CM

## 2020-12-01 LAB
BILIRUB BLD-MCNC: ABNORMAL MG/DL
CLARITY, POC: CLEAR
COLOR UR: YELLOW
GLUCOSE UR STRIP-MCNC: NEGATIVE MG/DL
KETONES UR QL: NEGATIVE
LEUKOCYTE EST, POC: NEGATIVE
NITRITE UR-MCNC: NEGATIVE MG/ML
PH UR: 5.5 [PH] (ref 5–8)
PROT UR STRIP-MCNC: NEGATIVE MG/DL
RBC # UR STRIP: NEGATIVE /UL
SP GR UR: 1.03 (ref 1–1.03)
UROBILINOGEN UR QL: NORMAL

## 2020-12-01 PROCEDURE — 74018 RADEX ABDOMEN 1 VIEW: CPT | Performed by: RADIOLOGY

## 2020-12-01 PROCEDURE — 99214 OFFICE O/P EST MOD 30 MIN: CPT | Performed by: NURSE PRACTITIONER

## 2020-12-01 PROCEDURE — 74018 RADEX ABDOMEN 1 VIEW: CPT

## 2020-12-01 NOTE — PROGRESS NOTES
Chief Complaint:          Chief Complaint   Patient presents with   • Flank Pain     Yearly fu kidney stones       HPI:   35 y.o. female.  Very pleasant lady with a significant history of right flank pain, sciatica, kidney stones returns to clinic today for evaluation of her flank pain.  She states over the last week she has had significant discomfort in her lower back that is now becoming very bothersome to her, radiating to right lower quadrant of her abdomen.    She describes her pain as colicky in nature, initially was intermittent but now constant, dull and very achy.  Occasionally it wakes her up from sleep, reports this is very consistent to her prior kidney stones.  However she denies nausea vomiting, she denies any urinary symptoms such as frequency, urgency, dysuria.  She denies any episodes of gross hematuria, pelvic/suprapubic discomfort.    Her urine dipstick today is complete negative for any infection, it is negative for gross/microscopic hematuria.    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   • Dental bridge present    • [place] filter in place     SINCE 2003   • History of transfusion    • History of unsteady gait     SINCE STROKE   • Hypertension    • Kidney stone    • Lupus (CMS/HCC)    • Migraine    • Seizures (CMS/HCC)     LAST ONE 2003   • Stroke (CMS/HCC)     2003     The following portions of the patient's history were reviewed and updated as appropriate: allergies, current medications, past family history, past medical history, past social history, past surgical history and problem list.    Current Meds:     Current Outpatient Medications   Medication Sig Dispense Refill   • APTIOM 800 MG tablet Take 800 mg by mouth 1 (One) Time.     • diphenhydrAMINE 12.5 MG/5ML elixir 20 mL, aluminum-magnesium hydroxide-simethicone 400-400-40 MG/5ML suspension 20 mL, Lidocaine Viscous  HCl 2 % solution 20 mL Swish and spit 5 mL Every 4 (Four) Hours As Needed for Stomatitis. 240 mL 1   • DULoxetine (CYMBALTA) 60 MG capsule Take 1 capsule by mouth Daily. 30 capsule 0   • fexofenadine (Allegra Allergy) 180 MG tablet Take 1 tablet by mouth Daily. 30 tablet 5   • fluconazole (Diflucan) 100 MG tablet Take 1 tablet by mouth Daily. 3 tablet 0   • fluticasone (Flonase) 50 MCG/ACT nasal spray 2 sprays into the nostril(s) as directed by provider Daily. 15.8 mL 2   • hydroxychloroquine (PLAQUENIL) 200 MG tablet Take 200 mg by mouth Daily.     • melatonin 5 MG tablet tablet Take 5 mg by mouth.     • mupirocin (BACTROBAN) 2 % ointment Apply  topically to the appropriate area as directed 3 (Three) Times a Day. 15 g 0   • nadolol (CORGARD) 20 MG tablet      • nortriptyline (PAMELOR) 10 MG capsule Take 20 mg by mouth Every Night.     • pantoprazole (PROTONIX) 40 MG EC tablet Take 1 tablet by mouth Daily.     • promethazine (PHENERGAN) 25 MG tablet Take 25 mg by mouth Every 8 (Eight) Hours As Needed.     • raNITIdine (ZANTAC) 150 MG tablet Take 1 tablet by mouth Every 12 (Twelve) Hours.     • SUMAtriptan (IMITREX) 100 MG tablet Take 100 mg by mouth 1 (One) Time As Needed.     • tiZANidine (ZANAFLEX) 2 MG tablet Take 2 mg by mouth 3 (Three) Times a Day As Needed (TAKES 2 AT BED TIME).     • traMADol (ULTRAM) 50 MG tablet Take  by mouth 3 (Three) Times a Day As Needed.     • vitamin D (ERGOCALCIFEROL) 60006 units capsule capsule Take 50,000 Units by mouth 1 (One) Time Per Week.       No current facility-administered medications for this visit.         Allergies:      Allergies   Allergen Reactions   • Dilantin [Phenytoin]    • Phenytoin Sodium Extended         Past Surgical History:     Past Surgical History:   Procedure Laterality Date   • ABDOMINAL SURGERY     • EXTRACORPOREAL SHOCK WAVE LITHOTRIPSY (ESWL) Left 8/18/2017    Procedure: EXTRACORPOREAL SHOCKWAVE LITHOTRIPSY, FLEXIBLE URETEROSCOPY;  Surgeon: Driss  Eladio Wharton MD;  Location: [facility];  Service:    • GALLBLADDER SURGERY     • KIDNEY STONE SURGERY           Social History:     Social History     Socioeconomic History   • Marital status: Single     Spouse name: Not on file   • Number of children: Not on file   • Years of education: Not on file   • Highest education level: Not on file   Tobacco Use   • Smoking status: Never Smoker   • Smokeless tobacco: Never Used   Substance and Sexual Activity   • Alcohol use: No   • Drug use: No   • Sexual activity: Defer     Birth control/protection: None       Family History:     Family History   Problem Relation Age of Onset   • Diabetes Father    • Thyroid disease Mother        Review of Systems:     Review of Systems   Constitutional: Positive for activity change and fatigue. Negative for chills and fever.   HENT: Negative for congestion and sinus pressure.    Eyes: Negative for blurred vision, pain and redness.   Respiratory: Negative for chest tightness.    Cardiovascular: Negative for chest pain.   Gastrointestinal: Positive for abdominal distention, abdominal pain and constipation. Negative for diarrhea, nausea and vomiting.   Genitourinary: Positive for flank pain. Negative for difficulty urinating, dyspareunia, dysuria, frequency, genital sores, hematuria, pelvic pain, pelvic pressure, urgency, urinary incontinence and vaginal discharge.   Musculoskeletal: Positive for back pain.   Allergic/Immunologic: Negative for food allergies.   Neurological: Negative for dizziness, headache and confusion.   Hematological: Does not bruise/bleed easily.   Psychiatric/Behavioral: Positive for positive for hyperactivity and stress. Negative for behavioral problems and decreased concentration. The patient is nervous/anxious.         Physical Exam:     Physical Exam  Constitutional:       General: She is not in acute distress.     Appearance: She is well-developed. She is obese.   HENT:      Head: Normocephalic and atraumatic.      Eyes:      Pupils: Pupils are equal, round, and reactive to light.   Neck:      Musculoskeletal: Normal range of motion.      Thyroid: No thyromegaly.      Trachea: No tracheal deviation.   Cardiovascular:      Rate and Rhythm: Normal rate and regular rhythm.      Heart sounds: No murmur.   Pulmonary:      Effort: Pulmonary effort is normal. No respiratory distress.      Breath sounds: Normal breath sounds. No stridor. No wheezing.   Abdominal:      General: Bowel sounds are normal. There is distension.      Palpations: Abdomen is soft.      Tenderness: There is abdominal tenderness.   Genitourinary:     Labia:         Right: No tenderness.         Left: No tenderness.       Vagina: Normal. No vaginal discharge.   Musculoskeletal: Normal range of motion.         General: Tenderness present. No deformity.   Skin:     General: Skin is warm and dry.      Capillary Refill: Capillary refill takes less than 2 seconds.      Coloration: Skin is not pale.      Findings: No erythema or rash.   Neurological:      Mental Status: She is alert and oriented to person, place, and time.      Cranial Nerves: No cranial nerve deficit.      Sensory: No sensory deficit.      Coordination: Coordination normal.   Psychiatric:         Behavior: Behavior normal.         Thought Content: Thought content normal.         Judgment: Judgment normal.         Procedure:       Assessment/Plan:         ASSESSMENT  Right Flank Pain: Very pleasant patient evaluated in clinic today with concerns of kidney stones.  She reports significant right flank pain, right lower quadrant discomfort that initially was intermittent but now has been consistent and becoming bothersome to her.  She denies N/V/D, she denies having any urinary symptoms.  Dipstick today is complete negative.    We both reviewed/discussed her KUB results which showed that a kidney stone is not demonstrated on today imaging. No definite calcifications were demonstratable overlying the  kidneys or along the course of the ureters.  The patient however has a large amount of fecal material in her colon.  She also has an inferior vena cava filter noted.     Patient has significant irritable bowel syndrome, characterized by extreme amounts of constipation.  We spoke about the impact of this on bladder function.  We spoke about  its relationship to recurrent urinary tract infections.  We discussed the need for increasing p.o. fluid intake to at least 2 to 3 L of water daily, and discussed the physiology of colonic motility as well as use of MiraLAX as a bulk laxative versus the newer class of serotonin uptake blockers such as Linzess.  We stressed the need for a daily bowel movement and discussed the Mayes stool scale at length.         PLAN  We discussed she continue conservative therapy for her flank pain.  Warm compresses to flank area, hot baths/hot showers, massage, physical exercise.    She is on adequate pain regiment with tramadol daily, Zanaflex and Cymbalta.    Discussed a kidney stone prevention diet to include increasing p.o. fluid intake, to at least 1 to 2 L of water daily.  She is to avoid caffeine products such as cola, coffee, and to avoid soft or soda drinks.  She is to decrease her sodium consumption as in  Fast foods, hills, salted nuts, canned foods, and smoked/cured foods.     She is also to decrease her oxalate consumption, as in spinach, Zach greens, and Rhubarb.  Also important is to decrease protein intake, as in red meats, peanut butter, and also avoid nuts.     We also discussed a referral to the GI nurse practitioner    We discussed repeating an upper tract investigation, patient has been scheduled for CT scan.    See her back in 2 weeks and review her CT scan results.    Patient is agreeable plan of care.    Patient reports that she is not currently experiencing any symptoms of urinary incontinence.    Patient's Body mass index is 40.91 kg/m². BMI is above normal  parameters. Recommendations include: educational material, exercise counseling and nutrition counseling.    Smoking Cessation Counseling:  Never a smoker.  Patient does not currently use any tobacco products.     Counseling was given to patient for the following topics diagnostic results including: RIGHT FLANK PAIN, Abd pain,, instructions for management as follows: increased activity, warm compresses to flank area, pain control, muscle relaxants and impressions as follows: CT Abdomen/Pelvis -kidney stones. The interim medical history and current results were reviewed.  A treatment plan with follow-up was made for Flank pain with history of urolithiasis [R10.9, Z87.442].          This document has been electronically signed by Griselda Cheng-Akwa, APRN December 2, 2020 08:40 EST

## 2020-12-02 NOTE — PATIENT INSTRUCTIONS
"Dietary Guidelines to Help Prevent Kidney Stones  Kidney stones are deposits of minerals and salts that form inside your kidneys. Your risk of developing kidney stones may be greater depending on your diet, your lifestyle, the medicines you take, and whether you have certain medical conditions. Most people can reduce their chances of developing kidney stones by following the instructions below. Depending on your overall health and the type of kidney stones you tend to develop, your dietitian may give you more specific instructions.  What are tips for following this plan?  Reading food labels  · Choose foods with \"no salt added\" or \"low-salt\" labels. Limit your sodium intake to less than 1500 mg per day.  · Choose foods with calcium for each meal and snack. Try to eat about 300 mg of calcium at each meal. Foods that contain 200-500 mg of calcium per serving include:  ? 8 oz (237 ml) of milk, fortified nondairy milk, and fortified fruit juice.  ? 8 oz (237 ml) of kefir, yogurt, and soy yogurt.  ? 4 oz (118 ml) of tofu.  ? 1 oz of cheese.  ? 1 cup (300 g) of dried figs.  ? 1 cup (91 g) of cooked broccoli.  ? 1-3 oz can of sardines or mackerel.  · Most people need 1000 to 1500 mg of calcium each day. Talk to your dietitian about how much calcium is recommended for you.  Shopping  · Buy plenty of fresh fruits and vegetables. Most people do not need to avoid fruits and vegetables, even if they contain nutrients that may contribute to kidney stones.  · When shopping for convenience foods, choose:  ? Whole pieces of fruit.  ? Premade salads with dressing on the side.  ? Low-fat fruit and yogurt smoothies.  · Avoid buying frozen meals or prepared deli foods.  · Look for foods with live cultures, such as yogurt and kefir.  Cooking  · Do not add salt to food when cooking. Place a salt shaker on the table and allow each person to add his or her own salt to taste.  · Use vegetable protein, such as beans, textured vegetable " protein (TVP), or tofu instead of meat in pasta, casseroles, and soups.  Meal planning    · Eat less salt, if told by your dietitian. To do this:  ? Avoid eating processed or premade food.  ? Avoid eating fast food.  · Eat less animal protein, including cheese, meat, poultry, or fish, if told by your dietitian. To do this:  ? Limit the number of times you have meat, poultry, fish, or cheese each week. Eat a diet free of meat at least 2 days a week.  ? Eat only one serving each day of meat, poultry, fish, or seafood.  ? When you prepare animal protein, cut pieces into small portion sizes. For most meat and fish, one serving is about the size of one deck of cards.  · Eat at least 5 servings of fresh fruits and vegetables each day. To do this:  ? Keep fruits and vegetables on hand for snacks.  ? Eat 1 piece of fruit or a handful of berries with breakfast.  ? Have a salad and fruit at lunch.  ? Have two kinds of vegetables at dinner.  · Limit foods that are high in a substance called oxalate. These include:  ? Spinach.  ? Rhubarb.  ? Beets.  ? Potato chips and french fries.  ? Nuts.  · If you regularly take a diuretic medicine, make sure to eat at least 1-2 fruits or vegetables high in potassium each day. These include:  ? Avocado.  ? Banana.  ? Orange, prune, carrot, or tomato juice.  ? Baked potato.  ? Cabbage.  ? Beans and split peas.  General instructions    · Drink enough fluid to keep your urine clear or pale yellow. This is the most important thing you can do.  · Talk to your health care provider and dietitian about taking daily supplements. Depending on your health and the cause of your kidney stones, you may be advised:  ? Not to take supplements with vitamin C.  ? To take a calcium supplement.  ? To take a daily probiotic supplement.  ? To take other supplements such as magnesium, fish oil, or vitamin B6.  · Take all medicines and supplements as told by your health care provider.  · Limit alcohol intake to no  more than 1 drink a day for nonpregnant women and 2 drinks a day for men. One drink equals 12 oz of beer, 5 oz of wine, or 1½ oz of hard liquor.  · Lose weight if told by your health care provider. Work with your dietitian to find strategies and an eating plan that works best for you.  What foods are not recommended?  Limit your intake of the following foods, or as told by your dietitian. Talk to your dietitian about specific foods you should avoid based on the type of kidney stones and your overall health.  Grains  Breads. Bagels. Rolls. Baked goods. Salted crackers. Cereal. Pasta.  Vegetables  Spinach. Rhubarb. Beets. Canned vegetables. Pickles. Olives.  Meats and other protein foods  Nuts. Nut butters. Large portions of meat, poultry, or fish. Salted or cured meats. Deli meats. Hot dogs. Sausages.  Dairy  Cheese.  Beverages  Regular soft drinks. Regular vegetable juice.  Seasonings and other foods  Seasoning blends with salt. Salad dressings. Canned soups. Soy sauce. Ketchup. Barbecue sauce. Canned pasta sauce. Casseroles. Pizza. Lasagna. Frozen meals. Potato chips. French fries.  Summary  · You can reduce your risk of kidney stones by making changes to your diet.  · The most important thing you can do is drink enough fluid. You should drink enough fluid to keep your urine clear or pale yellow.  · Ask your health care provider or dietitian how much protein from animal sources you should eat each day, and also how much salt and calcium you should have each day.  This information is not intended to replace advice given to you by your health care provider. Make sure you discuss any questions you have with your health care provider.  Document Revised: 04/08/2020 Document Reviewed: 11/28/2017  Elsevier Patient Education © 2020 [employer]      Low-Purine Eating Plan  A low-purine eating plan involves making food choices to limit your intake of purine. Purine is a kind of uric acid. Too much uric acid in your blood  can cause certain conditions, such as gout and kidney stones. Eating a low-purine diet can help control these conditions.  What are tips for following this plan?  Reading food labels    · Avoid foods with saturated or Trans fat.  · Check the ingredient list of grains-based foods, such as bread and cereal, to make sure that they contain whole grains.  · Check the ingredient list of sauces or soups to make sure they do not contain meat or fish.  · When choosing soft drinks, check the ingredient list to make sure they do not contain high-fructose corn syrup.  Shopping  · Buy plenty of fresh fruits and vegetables.  · Avoid buying canned or fresh fish.  · Buy dairy products labeled as low-fat or nonfat.  · Avoid buying premade or processed foods. These foods are often high in fat, salt (sodium), and added sugar.  Cooking  · Use olive oil instead of butter when cooking. Oils like olive oil, canola oil, and sunflower oil contain healthy fats.  Meal planning  · Learn which foods do or do not affect you. If you find out that a food tends to cause your gout symptoms to flare up, avoid eating that food. You can enjoy foods that do not cause problems. If you have any questions about a food item, talk with your dietitian or health care provider.  · Limit foods high in fat, especially saturated fat. Fat makes it harder for your body to get rid of uric acid.  · Choose foods that are lower in fat and are lean sources of protein.  General guidelines  · Limit alcohol intake to no more than 1 drink a day for nonpregnant women and 2 drinks a day for men. One drink equals 12 oz of beer, 5 oz of wine, or 1½ oz of hard liquor. Alcohol can affect the way your body gets rid of uric acid.  · Drink plenty of water to keep your urine clear or pale yellow. Fluids can help remove uric acid from your body.  · If directed by your health care provider, take a vitamin C supplement.  · Work with your health care provider and dietitian to develop a  plan to achieve or maintain a healthy weight. Losing weight can help reduce uric acid in your blood.  What foods are recommended?  The items listed may not be a complete list. Talk with your dietitian about what dietary choices are best for you.  Foods low in purines  Foods low in purines do not need to be limited. These include:  · All fruits.  · All low-purine vegetables, pickles, and olives.  · Breads, pasta, rice, cornbread, and popcorn. Cake and other baked goods.  · All dairy foods.  · Eggs, nuts, and nut butters.  · Spices and condiments, such as salt, herbs, and vinegar.  · Plant oils, butter, and margarine.  · Water, sugar-free soft drinks, tea, coffee, and cocoa.  · Vegetable-based soups, broths, sauces, and gravies.  Foods moderate in purines  Foods moderate in purines should be limited to the amounts listed.  · ½ cup of asparagus, cauliflower, spinach, mushrooms, or green peas, each day.  · 2/3 cup uncooked oatmeal, each day.  · ¼ cup dry wheat bran or wheat germ, each day.  · 2-3 ounces of meat or poultry, each day.  · 4-6 ounces of shellfish, such as crab, lobster, oysters, or shrimp, each day.  · 1 cup cooked beans, peas, or lentils, each day.  · Soup, broths, or bouillon made from meat or fish. Limit these foods as much as possible.  What foods are not recommended?  The items listed may not be a complete list. Talk with your dietitian about what dietary choices are best for you.  Limit your intake of foods high in purines, including:  · Beer and other alcohol.  · Meat-based gravy or sauce.  · Canned or fresh fish, such as:  ? Anchovies, sardines, herring, and tuna.  ? Mussels and scallops.  ? Codfish, trout, and blossom.  · Rodgers.  · Organ meats, such as:  ? Liver or kidney.  ? Tripe.  ? Sweetbreads (thymus gland or pancreas).  · Wild game or goose.  · Yeast or yeast extract supplements.  · Drinks sweetened with high-fructose corn syrup.  Summary  · Eating a low-purine diet can help control  conditions caused by too much uric acid in the body, such as gout or kidney stones.  · Choose low-purine foods, limit alcohol, and limit foods high in fat.  · You will learn over time which foods do or do not affect you. If you find out that a food tends to cause your gout symptoms to flare up, avoid eating that food.  This information is not intended to replace advice given to you by your health care provider. Make sure you discuss any questions you have with your health care provider.  Document Revised: 11/30/2018 Document Reviewed: 01/31/2018  [employer] Patient Education © 2020 [employer]    Discussed a kidney stone prevention diet to include increasing p.o. fluid intake, to at least 1 to 2 L of water daily.  She is to avoid caffeine products such as cola, coffee, and to avoid soft or soda drinks.  She is to decrease her sodium consumption as in  Fast foods, hills, salted nuts, canned foods, and smoked/cured foods. She is also to decrease her oxalate consumption, as in spinach, Zach greens, and Rhubarb.  Also important is to decrease protein intake, as in red meats, peanut butter, and also avoid nuts.    Flank Pain, Adult  Flank pain is pain in your side. The flank is the area of your side between your upper belly (abdomen) and your back. The pain may occur over a short time (acute), or it may be long-term or come back often (chronic). It may be mild or very bad. Pain in this area can be caused by many different things.  Follow these instructions at home:    · Drink enough fluid to keep your pee (urine) clear or pale yellow.  · Rest as told by your doctor.  · Take over-the-counter and prescription medicines only as told by your doctor.  · Keep a journal to keep track of:  ? What has caused your flank pain.  ? What has made it feel better.  · Keep all follow-up visits as told by your doctor. This is important.  Contact a doctor if:  · Medicine does not help your pain.  · You have new symptoms.  · Your pain  gets worse.  · You have a fever.  · Your symptoms last longer than 2-3 days.  · You have trouble peeing.  · You are peeing more often than normal.  Get help right away if:  · You have trouble breathing.  · You are short of breath.  · Your belly hurts, or it is swollen or red.  · You feel sick to your stomach (nauseous).  · You throw up (vomit).  · You feel like you will pass out, or you do pass out (faint).  · You have blood in your pee.  Summary  · Flank pain is pain in your side. The flank is the area of your side between your upper belly (abdomen) and your back.  · Flank pain may occur over a short time (acute), or it may be long-term or come back often (chronic). It may be mild or very bad.  · Pain in this area can be caused by many different things.  · Contact your doctor if your symptoms get worse or they last longer than 2-3 days.  This information is not intended to replace advice given to you by your health care provider. Make sure you discuss any questions you have with your health care provider.  Document Revised: 11/30/2018 Document Reviewed: 04/09/2018  Elsevier Patient Education © 2020 [employer]

## 2020-12-10 DIAGNOSIS — J30.2 SEASONAL ALLERGIC RHINITIS, UNSPECIFIED TRIGGER: ICD-10-CM

## 2020-12-10 RX ORDER — FEXOFENADINE HYDROCHLORIDE 180 MG/1
TABLET, FILM COATED ORAL
Qty: 30 TABLET | Refills: 5 | Status: SHIPPED | OUTPATIENT
Start: 2020-12-10 | End: 2021-06-16 | Stop reason: SDUPTHER

## 2020-12-11 ENCOUNTER — APPOINTMENT (OUTPATIENT)
Dept: CT IMAGING | Facility: HOSPITAL | Age: 35
End: 2020-12-11

## 2020-12-28 ENCOUNTER — HOSPITAL ENCOUNTER (OUTPATIENT)
Dept: CT IMAGING | Facility: HOSPITAL | Age: 35
Discharge: HOME | End: 2020-12-28
Admitting: NURSE PRACTITIONER

## 2020-12-28 DIAGNOSIS — R10.84 GENERALIZED ABDOMINAL PAIN: ICD-10-CM

## 2020-12-28 DIAGNOSIS — R10.9 FLANK PAIN WITH HISTORY OF UROLITHIASIS: ICD-10-CM

## 2020-12-28 DIAGNOSIS — Z87.442 FLANK PAIN WITH HISTORY OF UROLITHIASIS: ICD-10-CM

## 2020-12-28 LAB — CREAT BLDA-MCNC: 0.6 MG/DL (ref 0.6–1.3)

## 2020-12-28 PROCEDURE — 25010000002 IOPAMIDOL 61 % SOLUTION: Performed by: NURSE PRACTITIONER

## 2020-12-28 PROCEDURE — 74178 CT ABD&PLV WO CNTR FLWD CNTR: CPT | Performed by: RADIOLOGY

## 2020-12-28 PROCEDURE — 74178 CT ABD&PLV WO CNTR FLWD CNTR: CPT

## 2020-12-28 PROCEDURE — 82565 ASSAY OF CREATININE: CPT

## 2020-12-28 RX ADMIN — IOPAMIDOL 80 ML: 612 INJECTION, SOLUTION INTRAVENOUS at 14:38

## 2020-12-29 ENCOUNTER — OFFICE VISIT (OUTPATIENT)
Dept: UROLOGY | Facility: CLINIC | Age: 35
End: 2020-12-29

## 2020-12-29 VITALS — BODY MASS INDEX: 40.76 KG/M2 | TEMPERATURE: 97.2 F | WEIGHT: 268.96 LBS | HEIGHT: 68 IN

## 2020-12-29 DIAGNOSIS — Z87.442 FLANK PAIN WITH HISTORY OF UROLITHIASIS: ICD-10-CM

## 2020-12-29 DIAGNOSIS — R10.9 FLANK PAIN WITH HISTORY OF UROLITHIASIS: ICD-10-CM

## 2020-12-29 DIAGNOSIS — N20.0 LEFT NEPHROLITHIASIS: Primary | ICD-10-CM

## 2020-12-29 LAB
BILIRUB BLD-MCNC: ABNORMAL MG/DL
CLARITY, POC: CLEAR
COLOR UR: YELLOW
GLUCOSE UR STRIP-MCNC: NEGATIVE MG/DL
KETONES UR QL: NEGATIVE
LEUKOCYTE EST, POC: NEGATIVE
NITRITE UR-MCNC: NEGATIVE MG/ML
PH UR: 5 [PH] (ref 5–8)
PROT UR STRIP-MCNC: ABNORMAL MG/DL
RBC # UR STRIP: ABNORMAL /UL
SP GR UR: 1.03 (ref 1–1.03)
UROBILINOGEN UR QL: NORMAL

## 2020-12-29 PROCEDURE — 99214 OFFICE O/P EST MOD 30 MIN: CPT | Performed by: NURSE PRACTITIONER

## 2020-12-29 NOTE — PATIENT INSTRUCTIONS
"Dietary Guidelines to Help Prevent Kidney Stones  Kidney stones are deposits of minerals and salts that form inside your kidneys. Your risk of developing kidney stones may be greater depending on your diet, your lifestyle, the medicines you take, and whether you have certain medical conditions. Most people can reduce their chances of developing kidney stones by following the instructions below. Depending on your overall health and the type of kidney stones you tend to develop, your dietitian may give you more specific instructions.  What are tips for following this plan?  Reading food labels  · Choose foods with \"no salt added\" or \"low-salt\" labels. Limit your sodium intake to less than 1500 mg per day.  · Choose foods with calcium for each meal and snack. Try to eat about 300 mg of calcium at each meal. Foods that contain 200-500 mg of calcium per serving include:  ? 8 oz (237 ml) of milk, fortified nondairy milk, and fortified fruit juice.  ? 8 oz (237 ml) of kefir, yogurt, and soy yogurt.  ? 4 oz (118 ml) of tofu.  ? 1 oz of cheese.  ? 1 cup (300 g) of dried figs.  ? 1 cup (91 g) of cooked broccoli.  ? 1-3 oz can of sardines or mackerel.  · Most people need 1000 to 1500 mg of calcium each day. Talk to your dietitian about how much calcium is recommended for you.  Shopping  · Buy plenty of fresh fruits and vegetables. Most people do not need to avoid fruits and vegetables, even if they contain nutrients that may contribute to kidney stones.  · When shopping for convenience foods, choose:  ? Whole pieces of fruit.  ? Premade salads with dressing on the side.  ? Low-fat fruit and yogurt smoothies.  · Avoid buying frozen meals or prepared deli foods.  · Look for foods with live cultures, such as yogurt and kefir.  Cooking  · Do not add salt to food when cooking. Place a salt shaker on the table and allow each person to add his or her own salt to taste.  · Use vegetable protein, such as beans, textured vegetable " protein (TVP), or tofu instead of meat in pasta, casseroles, and soups.  Meal planning    · Eat less salt, if told by your dietitian. To do this:  ? Avoid eating processed or premade food.  ? Avoid eating fast food.  · Eat less animal protein, including cheese, meat, poultry, or fish, if told by your dietitian. To do this:  ? Limit the number of times you have meat, poultry, fish, or cheese each week. Eat a diet free of meat at least 2 days a week.  ? Eat only one serving each day of meat, poultry, fish, or seafood.  ? When you prepare animal protein, cut pieces into small portion sizes. For most meat and fish, one serving is about the size of one deck of cards.  · Eat at least 5 servings of fresh fruits and vegetables each day. To do this:  ? Keep fruits and vegetables on hand for snacks.  ? Eat 1 piece of fruit or a handful of berries with breakfast.  ? Have a salad and fruit at lunch.  ? Have two kinds of vegetables at dinner.  · Limit foods that are high in a substance called oxalate. These include:  ? Spinach.  ? Rhubarb.  ? Beets.  ? Potato chips and french fries.  ? Nuts.  · If you regularly take a diuretic medicine, make sure to eat at least 1-2 fruits or vegetables high in potassium each day. These include:  ? Avocado.  ? Banana.  ? Orange, prune, carrot, or tomato juice.  ? Baked potato.  ? Cabbage.  ? Beans and split peas.  General instructions    · Drink enough fluid to keep your urine clear or pale yellow. This is the most important thing you can do.  · Talk to your health care provider and dietitian about taking daily supplements. Depending on your health and the cause of your kidney stones, you may be advised:  ? Not to take supplements with vitamin C.  ? To take a calcium supplement.  ? To take a daily probiotic supplement.  ? To take other supplements such as magnesium, fish oil, or vitamin B6.  · Take all medicines and supplements as told by your health care provider.  · Limit alcohol intake to no  more than 1 drink a day for nonpregnant women and 2 drinks a day for men. One drink equals 12 oz of beer, 5 oz of wine, or 1½ oz of hard liquor.  · Lose weight if told by your health care provider. Work with your dietitian to find strategies and an eating plan that works best for you.  What foods are not recommended?  Limit your intake of the following foods, or as told by your dietitian. Talk to your dietitian about specific foods you should avoid based on the type of kidney stones and your overall health.  Grains  Breads. Bagels. Rolls. Baked goods. Salted crackers. Cereal. Pasta.  Vegetables  Spinach. Rhubarb. Beets. Canned vegetables. Pickles. Olives.  Meats and other protein foods  Nuts. Nut butters. Large portions of meat, poultry, or fish. Salted or cured meats. Deli meats. Hot dogs. Sausages.  Dairy  Cheese.  Beverages  Regular soft drinks. Regular vegetable juice.  Seasonings and other foods  Seasoning blends with salt. Salad dressings. Canned soups. Soy sauce. Ketchup. Barbecue sauce. Canned pasta sauce. Casseroles. Pizza. Lasagna. Frozen meals. Potato chips. French fries.  Summary  · You can reduce your risk of kidney stones by making changes to your diet.  · The most important thing you can do is drink enough fluid. You should drink enough fluid to keep your urine clear or pale yellow.  · Ask your health care provider or dietitian how much protein from animal sources you should eat each day, and also how much salt and calcium you should have each day.  This information is not intended to replace advice given to you by your health care provider. Make sure you discuss any questions you have with your health care provider.  Document Revised: 04/08/2020 Document Reviewed: 11/28/2017  [employer]vier Patient Education © 2020 [employer]      Low-Purine Eating Plan  A low-purine eating plan involves making food choices to limit your intake of purine. Purine is a kind of uric acid. Too much uric acid in your blood  can cause certain conditions, such as gout and kidney stones. Eating a low-purine diet can help control these conditions.  What are tips for following this plan?  Reading food labels    · Avoid foods with saturated or Trans fat.  · Check the ingredient list of grains-based foods, such as bread and cereal, to make sure that they contain whole grains.  · Check the ingredient list of sauces or soups to make sure they do not contain meat or fish.  · When choosing soft drinks, check the ingredient list to make sure they do not contain high-fructose corn syrup.  Shopping  · Buy plenty of fresh fruits and vegetables.  · Avoid buying canned or fresh fish.  · Buy dairy products labeled as low-fat or nonfat.  · Avoid buying premade or processed foods. These foods are often high in fat, salt (sodium), and added sugar.  Cooking  · Use olive oil instead of butter when cooking. Oils like olive oil, canola oil, and sunflower oil contain healthy fats.  Meal planning  · Learn which foods do or do not affect you. If you find out that a food tends to cause your gout symptoms to flare up, avoid eating that food. You can enjoy foods that do not cause problems. If you have any questions about a food item, talk with your dietitian or health care provider.  · Limit foods high in fat, especially saturated fat. Fat makes it harder for your body to get rid of uric acid.  · Choose foods that are lower in fat and are lean sources of protein.  General guidelines  · Limit alcohol intake to no more than 1 drink a day for nonpregnant women and 2 drinks a day for men. One drink equals 12 oz of beer, 5 oz of wine, or 1½ oz of hard liquor. Alcohol can affect the way your body gets rid of uric acid.  · Drink plenty of water to keep your urine clear or pale yellow. Fluids can help remove uric acid from your body.  · If directed by your health care provider, take a vitamin C supplement.  · Work with your health care provider and dietitian to develop a  plan to achieve or maintain a healthy weight. Losing weight can help reduce uric acid in your blood.  What foods are recommended?  The items listed may not be a complete list. Talk with your dietitian about what dietary choices are best for you.  Foods low in purines  Foods low in purines do not need to be limited. These include:  · All fruits.  · All low-purine vegetables, pickles, and olives.  · Breads, pasta, rice, cornbread, and popcorn. Cake and other baked goods.  · All dairy foods.  · Eggs, nuts, and nut butters.  · Spices and condiments, such as salt, herbs, and vinegar.  · Plant oils, butter, and margarine.  · Water, sugar-free soft drinks, tea, coffee, and cocoa.  · Vegetable-based soups, broths, sauces, and gravies.  Foods moderate in purines  Foods moderate in purines should be limited to the amounts listed.  · ½ cup of asparagus, cauliflower, spinach, mushrooms, or green peas, each day.  · 2/3 cup uncooked oatmeal, each day.  · ¼ cup dry wheat bran or wheat germ, each day.  · 2-3 ounces of meat or poultry, each day.  · 4-6 ounces of shellfish, such as crab, lobster, oysters, or shrimp, each day.  · 1 cup cooked beans, peas, or lentils, each day.  · Soup, broths, or bouillon made from meat or fish. Limit these foods as much as possible.  What foods are not recommended?  The items listed may not be a complete list. Talk with your dietitian about what dietary choices are best for you.  Limit your intake of foods high in purines, including:  · Beer and other alcohol.  · Meat-based gravy or sauce.  · Canned or fresh fish, such as:  ? Anchovies, sardines, herring, and tuna.  ? Mussels and scallops.  ? Codfish, trout, and blossom.  · Rodgers.  · Organ meats, such as:  ? Liver or kidney.  ? Tripe.  ? Sweetbreads (thymus gland or pancreas).  · Wild game or goose.  · Yeast or yeast extract supplements.  · Drinks sweetened with high-fructose corn syrup.  Summary  · Eating a low-purine diet can help control  conditions caused by too much uric acid in the body, such as gout or kidney stones.  · Choose low-purine foods, limit alcohol, and limit foods high in fat.  · You will learn over time which foods do or do not affect you. If you find out that a food tends to cause your gout symptoms to flare up, avoid eating that food.  This information is not intended to replace advice given to you by your health care provider. Make sure you discuss any questions you have with your health care provider.  Document Revised: 11/30/2018 Document Reviewed: 01/31/2018  [employer] Patient Education © 2020 [employer]    Discussed a kidney stone prevention diet to include increasing p.o. fluid intake, to at least 1 to 2 L of water daily.  She is to avoid caffeine products such as cola, coffee, and to avoid soft or soda drinks.  She is to decrease her sodium consumption as in  Fast foods, hills, salted nuts, canned foods, and smoked/cured foods. She is also to decrease her oxalate consumption, as in spinach, Zach greens, and Rhubarb.  Also important is to decrease protein intake, as in red meats, peanut butter, and also avoid nuts.

## 2020-12-29 NOTE — PROGRESS NOTES
Chief Complaint:          Chief Complaint   Patient presents with   • Left Nephrolithiasis /Bilateral Flank Pain     2 WEEK FOLLOW UP /Review CT Scan       HPI:   35 y.o. female.returns to clinic today for follow up on bilateral Flank Pain, lower back pain and lower quadrant abdominal discomfort. She reports improvement, in her symptoms, but still reports intermittent discomfort.    She is also here to review her CT scan which shows Small nonobstructing stone in the lower pole collecting system of the left kidney. At the time of the CT there were no findings  of ureteral stone or obstruction.    She has urine frequency, dysuria at times but denies any burning on urination, nocturia, gross hematuria, or CVA tenderness. She denies N/V/D. Denies chills or fevers.  Her urine dipstick is completely Negative for any infection, she has trace MicroHematuria.    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   • Dental bridge present    • Isabella filter in place     SINCE 2003   • History of transfusion    • History of unsteady gait     SINCE STROKE   • Hypertension    • Kidney stone    • Lupus (CMS/HCC)    • Migraine    • Seizures (CMS/[facility])     LAST ONE 2003   • Stroke (CMS/[facility])     2003         The following portions of the patient's history were reviewed and updated as appropriate: allergies, current medications, past family history, past medical history, past social history, past surgical history and problem list.    Current Meds:     Current Outpatient Medications   Medication Sig Dispense Refill   • Allergy Relief 180 MG tablet TAKE 1 TABLET BY MOUTH EVERY DAY 30 tablet 5   • APTIOM 800 MG tablet Take 800 mg by mouth 1 (One) Time.     • diphenhydrAMINE 12.5 MG/5ML elixir 20 mL, aluminum-magnesium hydroxide-simethicone 400-400-40 MG/5ML suspension 20 mL, Lidocaine Viscous HCl 2 % solution 20 mL Swish and spit 5 mL Every 4  (Four) Hours As Needed for Stomatitis. 240 mL 1   • DULoxetine (CYMBALTA) 60 MG capsule Take 1 capsule by mouth Daily. 30 capsule 0   • fluconazole (Diflucan) 100 MG tablet Take 1 tablet by mouth Daily. 3 tablet 0   • fluticasone (Flonase) 50 MCG/ACT nasal spray 2 sprays into the nostril(s) as directed by provider Daily. 15.8 mL 2   • hydroxychloroquine (PLAQUENIL) 200 MG tablet Take 200 mg by mouth Daily.     • melatonin 5 MG tablet tablet Take 5 mg by mouth.     • mupirocin (BACTROBAN) 2 % ointment Apply  topically to the appropriate area as directed 3 (Three) Times a Day. 15 g 0   • nadolol (CORGARD) 20 MG tablet      • nortriptyline (PAMELOR) 10 MG capsule Take 20 mg by mouth Every Night.     • pantoprazole (PROTONIX) 40 MG EC tablet Take 1 tablet by mouth Daily.     • promethazine (PHENERGAN) 25 MG tablet Take 25 mg by mouth Every 8 (Eight) Hours As Needed.     • raNITIdine (ZANTAC) 150 MG tablet Take 1 tablet by mouth Every 12 (Twelve) Hours.     • SUMAtriptan (IMITREX) 100 MG tablet Take 100 mg by mouth 1 (One) Time As Needed.     • tiZANidine (ZANAFLEX) 2 MG tablet Take 2 mg by mouth 3 (Three) Times a Day As Needed (TAKES 2 AT BED TIME).     • traMADol (ULTRAM) 50 MG tablet Take  by mouth 3 (Three) Times a Day As Needed.     • vitamin D (ERGOCALCIFEROL) 66965 units capsule capsule Take 50,000 Units by mouth 1 (One) Time Per Week.       No current facility-administered medications for this visit.         Allergies:      Allergies   Allergen Reactions   • Dilantin [Phenytoin]    • Phenytoin Sodium Extended         Past Surgical History:     Past Surgical History:   Procedure Laterality Date   • ABDOMINAL SURGERY     • EXTRACORPOREAL SHOCK WAVE LITHOTRIPSY (ESWL) Left 8/18/2017    Procedure: EXTRACORPOREAL SHOCKWAVE LITHOTRIPSY, FLEXIBLE URETEROSCOPY;  Surgeon: Driss Wharton MD;  Location: [facility];  Service:    • GALLBLADDER SURGERY     • KIDNEY STONE SURGERY           Social History:     Social  History     Socioeconomic History   • Marital status: Single     Spouse name: Not on file   • Number of children: Not on file   • Years of education: Not on file   • Highest education level: Not on file   Tobacco Use   • Smoking status: Never Smoker   • Smokeless tobacco: Never Used   Substance and Sexual Activity   • Alcohol use: No   • Drug use: No   • Sexual activity: Defer     Birth control/protection: None       Family History:     Family History   Problem Relation Age of Onset   • Diabetes Father    • Thyroid disease Mother        Review of Systems:     Review of Systems   Constitutional: Positive for activity change and fatigue. Negative for chills and fever.   HENT: Negative for congestion.    Eyes: Negative for blurred vision.   Gastrointestinal: Negative for abdominal pain, nausea and vomiting.   Genitourinary: Positive for dysuria, flank pain, pelvic pain and pelvic pressure. Negative for difficulty urinating, dyspareunia, frequency, genital sores, hematuria, urgency, urinary incontinence and vaginal discharge.   Musculoskeletal: Positive for back pain. Arthralgias:  lower back.   Neurological: Negative for dizziness, headache and confusion.   Psychiatric/Behavioral: Positive for stress. Negative for behavioral problems and decreased concentration.        Physical Exam:     Physical Exam  Constitutional:       Appearance: She is well-developed. She is obese.   HENT:      Head: Normocephalic and atraumatic.   Eyes:      Pupils: Pupils are equal, round, and reactive to light.   Neck:      Musculoskeletal: Normal range of motion.      Thyroid: No thyromegaly.      Trachea: No tracheal deviation.   Cardiovascular:      Rate and Rhythm: Normal rate and regular rhythm.      Heart sounds: No murmur.   Pulmonary:      Effort: Pulmonary effort is normal. No respiratory distress.      Breath sounds: Normal breath sounds. No stridor. No wheezing.   Abdominal:      General: Bowel sounds are normal. There is  distension.      Palpations: Abdomen is soft.      Tenderness: There is abdominal tenderness.   Genitourinary:     Labia:         Right: No tenderness.         Left: No tenderness.       Vagina: Normal. No vaginal discharge.   Musculoskeletal: Normal range of motion.         General: Tenderness present. No deformity.   Skin:     General: Skin is warm and dry.      Capillary Refill: Capillary refill takes less than 2 seconds.      Coloration: Skin is not pale.      Findings: No erythema or rash.   Neurological:      Mental Status: She is alert and oriented to person, place, and time.      Cranial Nerves: No cranial nerve deficit.      Sensory: No sensory deficit.      Coordination: Coordination normal.   Psychiatric:         Behavior: Behavior normal.         Thought Content: Thought content normal.         Judgment: Judgment normal.         Procedure:       Assessment/Plan:               ASSESSMENT  Left Renal Calculus /Flank Pain: The Patient has been diagnosed with a Left renal calculus.  SHe presents with lower back pain, bilateral flank pain with left  colicky pain that has been intermittent occasionally sharp with pelvic pressure and discomfort ongoing for 2 weeks. She has urine frequency, dysuria at times but denies any burning on urination, nocturia, gross hematuria, or CVA tenderness. She denies N/V/D. Denies chills or fevers.  Her urine dipstick is completely Negative for any infection, she has trace MicroHematuria.    We both reviewed and discussed her CT scan results which show Small nonobstructing stone in the lower pole collecting system of the left kidney. At the time of the CT there were no findings of ureteral stone or obstruction.     We have discussed the various parameters regarding spontaneous passage including the notion that a 2-3 mm stone has a high likelihood of spontaneous passage versus a larger stone  being caught up in the upper areas of the urinary tract. We also discussed the medical  management of stone disease and the use of medical expulsive therapy in the form of Flomax. This is used in an off label setting. I also talked about nonoperative management including ambulation and increasing fluids and hot tub as being an effective adjuncts in the treatment of a stone. We discussed the absolute relative indicators for intervention including the presence of sepsis, and pain we cannot control as the primary need for urgent intervention.           PLAN  We discussed she continue conservative therapy for her flank pain.  Warm compresses to flank area, hot baths/hot showers, massage, physical exercise.     She is on adequate pain regiment with tramadol daily, Zanaflex and Cymbalta.     Discussed a kidney stone prevention diet to include increasing p.o. fluid intake, to at least 1 to 2 L of water daily.  She is to avoid caffeine products such as cola, coffee, and to avoid soft or soda drinks.  She is to decrease her sodium consumption as in  Fast foods, hills, salted nuts, canned foods, and smoked/cured foods.      She is also to decrease her oxalate consumption, as in spinach, Zach greens, and Rhubarb.  Also important is to decrease protein intake, as in red meats, peanut butter, and also avoid nuts.      We also discussed a referral to the GI nurse practitioner for her IBBS with constipation -Patient wants to wait. Reports results with Miralax     See her back in One year for follow up or sooner if need be.     Patient is agreeable plan of care.     Patient reports that she is not currently experiencing any symptoms of urinary incontinence.    Patient's Body mass index is 40.91 kg/m². BMI is above normal parameters. Recommendations include: educational material, exercise counseling and nutrition counseling.    Smoking Cessation Counseling:  Never a smoker.  Patient does not currently use any tobacco products.     Counseling was given to patient for the following topics diagnostic results including:  RIGHT FLANK PAIN, Abd pain,, instructions for management as follows: increased activity, warm compresses to flank area, pain control, muscle relaxants and impressions as follows: CT Abdomen/Pelvis -kidney stones. The interim medical history and current results were reviewed.  A treatment plan with follow-up was made for Flank pain with history of urolithiasis,  Left nephrolithiasis [N20.0].          This document has been electronically signed by Griselda Cheng-Akwa, APRN December 29, 2020 10:52 EST

## 2021-03-15 ENCOUNTER — IMMUNIZATION (OUTPATIENT)
Dept: VACCINE CLINIC | Facility: HOSPITAL | Age: 36
End: 2021-03-15

## 2021-03-15 PROCEDURE — 0001A: CPT | Performed by: INTERNAL MEDICINE

## 2021-03-15 PROCEDURE — 91300 HC SARSCOV02 VAC 30MCG/0.3ML IM: CPT | Performed by: INTERNAL MEDICINE

## 2021-04-05 ENCOUNTER — TRANSCRIBE ORDERS (OUTPATIENT)
Dept: ADMINISTRATIVE | Facility: HOSPITAL | Age: 36
End: 2021-04-05

## 2021-04-05 ENCOUNTER — LAB (OUTPATIENT)
Dept: LAB | Facility: HOSPITAL | Age: 36
End: 2021-04-05

## 2021-04-05 ENCOUNTER — IMMUNIZATION (OUTPATIENT)
Dept: VACCINE CLINIC | Facility: HOSPITAL | Age: 36
End: 2021-04-05

## 2021-04-05 DIAGNOSIS — M32.9 SYSTEMIC LUPUS ERYTHEMATOSUS, UNSPECIFIED SLE TYPE, UNSPECIFIED ORGAN INVOLVEMENT STATUS (HCC): ICD-10-CM

## 2021-04-05 DIAGNOSIS — I82.A19: ICD-10-CM

## 2021-04-05 DIAGNOSIS — E55.9 VITAMIN D DEFICIENCY: Primary | ICD-10-CM

## 2021-04-05 DIAGNOSIS — R53.83 OTHER FATIGUE: ICD-10-CM

## 2021-04-05 DIAGNOSIS — E55.9 VITAMIN D DEFICIENCY: ICD-10-CM

## 2021-04-05 PROCEDURE — 87086 URINE CULTURE/COLONY COUNT: CPT

## 2021-04-05 PROCEDURE — 86160 COMPLEMENT ANTIGEN: CPT

## 2021-04-05 PROCEDURE — 36415 COLL VENOUS BLD VENIPUNCTURE: CPT

## 2021-04-05 PROCEDURE — 81001 URINALYSIS AUTO W/SCOPE: CPT

## 2021-04-05 PROCEDURE — 82306 VITAMIN D 25 HYDROXY: CPT

## 2021-04-05 PROCEDURE — 85025 COMPLETE CBC W/AUTO DIFF WBC: CPT

## 2021-04-05 PROCEDURE — 91300 HC SARSCOV02 VAC 30MCG/0.3ML IM: CPT | Performed by: INTERNAL MEDICINE

## 2021-04-05 PROCEDURE — 80053 COMPREHEN METABOLIC PANEL: CPT

## 2021-04-05 PROCEDURE — 86225 DNA ANTIBODY NATIVE: CPT

## 2021-04-05 PROCEDURE — 0002A: CPT | Performed by: INTERNAL MEDICINE

## 2021-04-06 LAB
25(OH)D3 SERPL-MCNC: 21.7 NG/ML (ref 30–100)
ALBUMIN SERPL-MCNC: 4.3 G/DL (ref 3.5–5.2)
ALBUMIN/GLOB SERPL: 1.8 G/DL
ALP SERPL-CCNC: 67 U/L (ref 39–117)
ALT SERPL W P-5'-P-CCNC: 14 U/L (ref 1–33)
ANION GAP SERPL CALCULATED.3IONS-SCNC: 10.5 MMOL/L (ref 5–15)
AST SERPL-CCNC: 20 U/L (ref 1–32)
BACTERIA SPEC AEROBE CULT: NORMAL
BACTERIA UR QL AUTO: ABNORMAL /HPF
BASOPHILS # BLD AUTO: 0.01 10*3/MM3 (ref 0–0.2)
BASOPHILS NFR BLD AUTO: 0.2 % (ref 0–1.5)
BILIRUB SERPL-MCNC: 0.2 MG/DL (ref 0–1.2)
BILIRUB UR QL STRIP: NEGATIVE
BUN SERPL-MCNC: 14 MG/DL (ref 6–20)
BUN/CREAT SERPL: 18.9 (ref 7–25)
C3 SERPL-MCNC: 146 MG/DL (ref 82–167)
C4 SERPL-MCNC: 20 MG/DL (ref 14–44)
CALCIUM SPEC-SCNC: 9.2 MG/DL (ref 8.6–10.5)
CHLORIDE SERPL-SCNC: 105 MMOL/L (ref 98–107)
CLARITY UR: ABNORMAL
CO2 SERPL-SCNC: 21.5 MMOL/L (ref 22–29)
COD CRY URNS QL: ABNORMAL /HPF
COLOR UR: YELLOW
CREAT SERPL-MCNC: 0.74 MG/DL (ref 0.57–1)
DEPRECATED RDW RBC AUTO: 42.6 FL (ref 37–54)
DSDNA AB SER-ACNC: 9 IU/ML (ref 0–9)
EOSINOPHIL # BLD AUTO: 0.19 10*3/MM3 (ref 0–0.4)
EOSINOPHIL NFR BLD AUTO: 3.2 % (ref 0.3–6.2)
ERYTHROCYTE [DISTWIDTH] IN BLOOD BY AUTOMATED COUNT: 13.7 % (ref 12.3–15.4)
GFR SERPL CREATININE-BSD FRML MDRD: 89 ML/MIN/1.73
GLOBULIN UR ELPH-MCNC: 2.4 GM/DL
GLUCOSE SERPL-MCNC: 80 MG/DL (ref 65–99)
GLUCOSE UR STRIP-MCNC: NEGATIVE MG/DL
HCT VFR BLD AUTO: 41.6 % (ref 34–46.6)
HGB BLD-MCNC: 13.4 G/DL (ref 12–15.9)
HGB UR QL STRIP.AUTO: NEGATIVE
HYALINE CASTS UR QL AUTO: ABNORMAL /LPF
IMM GRANULOCYTES # BLD AUTO: 0.02 10*3/MM3 (ref 0–0.05)
IMM GRANULOCYTES NFR BLD AUTO: 0.3 % (ref 0–0.5)
KETONES UR QL STRIP: NEGATIVE
LEUKOCYTE ESTERASE UR QL STRIP.AUTO: NEGATIVE
LYMPHOCYTES # BLD AUTO: 2.33 10*3/MM3 (ref 0.7–3.1)
LYMPHOCYTES NFR BLD AUTO: 39.7 % (ref 19.6–45.3)
MCH RBC QN AUTO: 27.3 PG (ref 26.6–33)
MCHC RBC AUTO-ENTMCNC: 32.2 G/DL (ref 31.5–35.7)
MCV RBC AUTO: 84.9 FL (ref 79–97)
MONOCYTES # BLD AUTO: 0.49 10*3/MM3 (ref 0.1–0.9)
MONOCYTES NFR BLD AUTO: 8.3 % (ref 5–12)
NEUTROPHILS NFR BLD AUTO: 2.83 10*3/MM3 (ref 1.7–7)
NEUTROPHILS NFR BLD AUTO: 48.3 % (ref 42.7–76)
NITRITE UR QL STRIP: NEGATIVE
NRBC BLD AUTO-RTO: 0 /100 WBC (ref 0–0.2)
PH UR STRIP.AUTO: 5.5 [PH] (ref 5–8)
PLATELET # BLD AUTO: 209 10*3/MM3 (ref 140–450)
PMV BLD AUTO: 11.5 FL (ref 6–12)
POTASSIUM SERPL-SCNC: 4.1 MMOL/L (ref 3.5–5.2)
PROT SERPL-MCNC: 6.7 G/DL (ref 6–8.5)
PROT UR QL STRIP: ABNORMAL
RBC # BLD AUTO: 4.9 10*6/MM3 (ref 3.77–5.28)
RBC # UR: ABNORMAL /HPF
REF LAB TEST METHOD: ABNORMAL
SODIUM SERPL-SCNC: 137 MMOL/L (ref 136–145)
SP GR UR STRIP: >=1.03 (ref 1–1.03)
SQUAMOUS #/AREA URNS HPF: ABNORMAL /HPF
UROBILINOGEN UR QL STRIP: ABNORMAL
WBC # BLD AUTO: 5.87 10*3/MM3 (ref 3.4–10.8)
WBC UR QL AUTO: ABNORMAL /HPF
YEAST URNS QL MICRO: ABNORMAL /HPF

## 2021-04-12 ENCOUNTER — OFFICE VISIT (OUTPATIENT)
Dept: FAMILY MEDICINE CLINIC | Facility: CLINIC | Age: 36
End: 2021-04-12

## 2021-04-12 VITALS
DIASTOLIC BLOOD PRESSURE: 78 MMHG | BODY MASS INDEX: 39.16 KG/M2 | TEMPERATURE: 97 F | HEIGHT: 68 IN | OXYGEN SATURATION: 98 % | SYSTOLIC BLOOD PRESSURE: 132 MMHG | WEIGHT: 258.4 LBS | HEART RATE: 102 BPM

## 2021-04-12 DIAGNOSIS — G25.81 RESTLESS LEG SYNDROME: Primary | ICD-10-CM

## 2021-04-12 DIAGNOSIS — J06.9 ACUTE URI: ICD-10-CM

## 2021-04-12 LAB
FLUAV RNA RESP QL NAA+PROBE: NOT DETECTED
FLUBV RNA RESP QL NAA+PROBE: NOT DETECTED
SARS-COV-2 RNA RESP QL NAA+PROBE: NOT DETECTED

## 2021-04-12 PROCEDURE — 99213 OFFICE O/P EST LOW 20 MIN: CPT | Performed by: NURSE PRACTITIONER

## 2021-04-12 PROCEDURE — 87636 SARSCOV2 & INF A&B AMP PRB: CPT | Performed by: NURSE PRACTITIONER

## 2021-04-12 RX ORDER — METHYLPREDNISOLONE 4 MG/1
TABLET ORAL
Qty: 21 TABLET | Refills: 0 | Status: SHIPPED | OUTPATIENT
Start: 2021-04-12 | End: 2021-06-09

## 2021-04-12 RX ORDER — OMEPRAZOLE 40 MG/1
40 CAPSULE, DELAYED RELEASE ORAL DAILY
Status: ON HOLD | COMMUNITY
Start: 2021-02-11 | End: 2021-11-17

## 2021-04-12 RX ORDER — CEFDINIR 300 MG/1
300 CAPSULE ORAL 2 TIMES DAILY
Qty: 20 CAPSULE | Refills: 0 | Status: SHIPPED | OUTPATIENT
Start: 2021-04-12 | End: 2021-06-09

## 2021-04-12 RX ORDER — PRAMIPEXOLE DIHYDROCHLORIDE 0.25 MG/1
.25-.5 TABLET ORAL
COMMUNITY
Start: 2021-03-10 | End: 2021-06-09

## 2021-04-12 NOTE — PATIENT INSTRUCTIONS
Agree with triage advice. No further action. Calorie Counting for Weight Loss  Calories are units of energy. Your body needs a certain amount of calories from food to keep you going throughout the day. When you eat more calories than your body needs, your body stores the extra calories as fat. When you eat fewer calories than your body needs, your body burns fat to get the energy it needs.  Calorie counting means keeping track of how many calories you eat and drink each day. Calorie counting can be helpful if you need to lose weight. If you make sure to eat fewer calories than your body needs, you should lose weight. Ask your health care provider what a healthy weight is for you.  For calorie counting to work, you will need to eat the right number of calories in a day in order to lose a healthy amount of weight per week. A dietitian can help you determine how many calories you need in a day and will give you suggestions on how to reach your calorie goal.  · A healthy amount of weight to lose per week is usually 1-2 lb (0.5-0.9 kg). This usually means that your daily calorie intake should be reduced by 500-750 calories.  · Eating 1,200 - 1,500 calories per day can help most women lose weight.  · Eating 1,500 - 1,800 calories per day can help most men lose weight.  What is my plan?  My goal is to have __________ calories per day.  If I have this many calories per day, I should lose around __________ pounds per week.  What do I need to know about calorie counting?  In order to meet your daily calorie goal, you will need to:  · Find out how many calories are in each food you would like to eat. Try to do this before you eat.  · Decide how much of the food you plan to eat.  · Write down what you ate and how many calories it had. Doing this is called keeping a food log.  To successfully lose weight, it is important to balance calorie counting with a healthy lifestyle that includes regular activity. Aim for 150 minutes of moderate exercise (such as walking) or 75  minutes of vigorous exercise (such as running) each week.  Where do I find calorie information?    The number of calories in a food can be found on a Nutrition Facts label. If a food does not have a Nutrition Facts label, try to look up the calories online or ask your dietitian for help.  Remember that calories are listed per serving. If you choose to have more than one serving of a food, you will have to multiply the calories per serving by the amount of servings you plan to eat. For example, the label on a package of bread might say that a serving size is 1 slice and that there are 90 calories in a serving. If you eat 1 slice, you will have eaten 90 calories. If you eat 2 slices, you will have eaten 180 calories.  How do I keep a food log?  Immediately after each meal, record the following information in your food log:  · What you ate. Don't forget to include toppings, sauces, and other extras on the food.  · How much you ate. This can be measured in cups, ounces, or number of items.  · How many calories each food and drink had.  · The total number of calories in the meal.  Keep your food log near you, such as in a small notebook in your pocket, or use a mobile lima or website. Some programs will calculate calories for you and show you how many calories you have left for the day to meet your goal.  What are some calorie counting tips?    · Use your calories on foods and drinks that will fill you up and not leave you hungry:  ? Some examples of foods that fill you up are nuts and nut butters, vegetables, lean proteins, and high-fiber foods like whole grains. High-fiber foods are foods with more than 5 g fiber per serving.  ? Drinks such as sodas, specialty coffee drinks, alcohol, and juices have a lot of calories, yet do not fill you up.  · Eat nutritious foods and avoid empty calories. Empty calories are calories you get from foods or beverages that do not have many vitamins or protein, such as candy, sweets, and  "soda. It is better to have a nutritious high-calorie food (such as an avocado) than a food with few nutrients (such as a bag of chips).  · Know how many calories are in the foods you eat most often. This will help you calculate calorie counts faster.  · Pay attention to calories in drinks. Low-calorie drinks include water and unsweetened drinks.  · Pay attention to nutrition labels for \"low fat\" or \"fat free\" foods. These foods sometimes have the same amount of calories or more calories than the full fat versions. They also often have added sugar, starch, or salt, to make up for flavor that was removed with the fat.  · Find a way of tracking calories that works for you. Get creative. Try different apps or programs if writing down calories does not work for you.  What are some portion control tips?  · Know how many calories are in a serving. This will help you know how many servings of a certain food you can have.  · Use a measuring cup to measure serving sizes. You could also try weighing out portions on a kitchen scale. With time, you will be able to estimate serving sizes for some foods.  · Take some time to put servings of different foods on your favorite plates, bowls, and cups so you know what a serving looks like.  · Try not to eat straight from a bag or box. Doing this can lead to overeating. Put the amount you would like to eat in a cup or on a plate to make sure you are eating the right portion.  · Use smaller plates, glasses, and bowls to prevent overeating.  · Try not to multitask (for example, watch TV or use your computer) while eating. If it is time to eat, sit down at a table and enjoy your food. This will help you to know when you are full. It will also help you to be aware of what you are eating and how much you are eating.  What are tips for following this plan?  Reading food labels  · Check the calorie count compared to the serving size. The serving size may be smaller than what you are used to " "eating.  · Check the source of the calories. Make sure the food you are eating is high in vitamins and protein and low in saturated and trans fats.  Shopping  · Read nutrition labels while you shop. This will help you make healthy decisions before you decide to purchase your food.  · Make a grocery list and stick to it.  Cooking  · Try to cook your favorite foods in a healthier way. For example, try baking instead of frying.  · Use low-fat dairy products.  Meal planning  · Use more fruits and vegetables. Half of your plate should be fruits and vegetables.  · Include lean proteins like poultry and fish.  How do I count calories when eating out?  · Ask for smaller portion sizes.  · Consider sharing an entree and sides instead of getting your own entree.  · If you get your own entree, eat only half. Ask for a box at the beginning of your meal and put the rest of your entree in it so you are not tempted to eat it.  · If calories are listed on the menu, choose the lower calorie options.  · Choose dishes that include vegetables, fruits, whole grains, low-fat dairy products, and lean protein.  · Choose items that are boiled, broiled, grilled, or steamed. Stay away from items that are buttered, battered, fried, or served with cream sauce. Items labeled \"crispy\" are usually fried, unless stated otherwise.  · Choose water, low-fat milk, unsweetened iced tea, or other drinks without added sugar. If you want an alcoholic beverage, choose a lower calorie option such as a glass of wine or light beer.  · Ask for dressings, sauces, and syrups on the side. These are usually high in calories, so you should limit the amount you eat.  · If you want a salad, choose a garden salad and ask for grilled meats. Avoid extra toppings like hills, cheese, or fried items. Ask for the dressing on the side, or ask for olive oil and vinegar or lemon to use as dressing.  · Estimate how many servings of a food you are given. For example, a serving of " cooked rice is ½ cup or about the size of half a baseball. Knowing serving sizes will help you be aware of how much food you are eating at restaurants. The list below tells you how big or small some common portion sizes are based on everyday objects:  ? 1 oz--4 stacked dice.  ? 3 oz--1 deck of cards.  ? 1 tsp--1 die.  ? 1 Tbsp--½ a ping-pong ball.  ? 2 Tbsp--1 ping-pong ball.  ? ½ cup--½ baseball.  ? 1 cup--1 baseball.  Summary  · Calorie counting means keeping track of how many calories you eat and drink each day. If you eat fewer calories than your body needs, you should lose weight.  · A healthy amount of weight to lose per week is usually 1-2 lb (0.5-0.9 kg). This usually means reducing your daily calorie intake by 500-750 calories.  · The number of calories in a food can be found on a Nutrition Facts label. If a food does not have a Nutrition Facts label, try to look up the calories online or ask your dietitian for help.  · Use your calories on foods and drinks that will fill you up, and not on foods and drinks that will leave you hungry.  · Use smaller plates, glasses, and bowls to prevent overeating.  This information is not intended to replace advice given to you by your health care provider. Make sure you discuss any questions you have with your health care provider.  Document Revised: 09/06/2019 Document Reviewed: 11/17/2017  [employer] Patient Education © 2020 [employer]

## 2021-04-12 NOTE — PROGRESS NOTES
"Milind Benítez is a 35 y.o. female.     Chief Complaint   Patient presents with   • Cough   • Sore Throat   • Nasal Congestion       She presents with c/o headache, sore throat, runny nose, and extreme fatigue for the past couple weeks. She denies fever and chills. She denies loss of taste and smell. She has been taking benadryl but it is not helping. She c/o cough with sputum in her throat, but she didn't cough it up. She had her second COVID vaccine this past Monday.        The following portions of the patient's history were reviewed and updated as appropriate: allergies, current medications, past family history, past medical history, past social history, past surgical history and problem list.    Review of Systems   Constitutional: Positive for fatigue. Negative for fever and unexpected weight change.   HENT: Positive for postnasal drip, rhinorrhea, sore throat and trouble swallowing. Negative for ear pain.    Eyes: Negative for visual disturbance.   Respiratory: Positive for cough. Negative for chest tightness and shortness of breath.    Cardiovascular: Negative for chest pain, palpitations and leg swelling.   Gastrointestinal: Positive for diarrhea and nausea. Negative for abdominal pain, blood in stool, constipation and vomiting.   Endocrine: Negative for cold intolerance and heat intolerance.   Genitourinary: Negative for dysuria and hematuria.   Musculoskeletal: Negative for arthralgias and myalgias.   Skin: Negative for color change.   Allergic/Immunologic: Negative for environmental allergies.   Neurological: Positive for headaches. Negative for dizziness, seizures and syncope.   Hematological: Negative for adenopathy.   Psychiatric/Behavioral: Negative for suicidal ideas. The patient is not nervous/anxious.        Objective     /78 (BP Location: Right arm, Patient Position: Sitting, Cuff Size: Adult)   Pulse 102   Temp 97 °F (36.1 °C) (Temporal)   Ht 172.7 cm (67.99\")   Wt 117 kg " (258 lb 6.4 oz)   SpO2 98%   BMI 39.30 kg/m²     Physical Exam  Constitutional:       General: She is not in acute distress.     Appearance: Normal appearance. She is well-developed. She is not diaphoretic.   HENT:      Head: Normocephalic and atraumatic.      Right Ear: Hearing, tympanic membrane, ear canal and external ear normal.      Left Ear: Hearing, tympanic membrane, ear canal and external ear normal.      Nose: Nose normal.      Right Sinus: No maxillary sinus tenderness or frontal sinus tenderness.      Left Sinus: No maxillary sinus tenderness or frontal sinus tenderness.      Mouth/Throat:      Mouth: Mucous membranes are moist.      Pharynx: Oropharynx is clear. Uvula midline.   Eyes:      General: Lids are normal.      Conjunctiva/sclera: Conjunctivae normal.      Pupils: Pupils are equal, round, and reactive to light.   Neck:      Trachea: Trachea normal. No tracheal tenderness or tracheal deviation.   Cardiovascular:      Rate and Rhythm: Normal rate and regular rhythm.      Heart sounds: Normal heart sounds, S1 normal and S2 normal. No murmur heard.   No friction rub. No gallop.    Pulmonary:      Effort: Pulmonary effort is normal. No respiratory distress.      Breath sounds: Normal breath sounds.   Abdominal:      General: Bowel sounds are normal. There is no distension.      Palpations: Abdomen is soft.      Tenderness: There is no abdominal tenderness.   Lymphadenopathy:      Cervical: No cervical adenopathy.   Skin:     General: Skin is warm and dry.   Neurological:      Mental Status: She is alert and oriented to person, place, and time.   Psychiatric:         Behavior: Behavior normal.         Thought Content: Thought content normal.         Judgment: Judgment normal.         Assessment/Plan     Problem List Items Addressed This Visit     None      Visit Diagnoses     Restless leg syndrome    -  Primary    Acute URI        Relevant Medications    cefdinir (OMNICEF) 300 MG capsule     methylPREDNISolone (MEDROL) 4 MG dose pack    Other Relevant Orders    COVID-19 and FLU A/B PCR - Swab, Nasopharynx          Plan: Get COVID screening test. Cefdinir and medrol ordered for acute uri. Rest, drink lots of fluids. Follow up as needed. Labs from 4/5 from another provider reviewed. CBC, CMP normal.     Patient's Body mass index is 39.3 kg/m². BMI is above normal parameters. Recommendations include: educational material.   (Normal BMI:  18.5-24.9, OW 25-29.9, Obesity 30 or greater)          Understands disease processes and need for medications.  Understands reasons for urgent and emergent care.  Patient (& family) verbalized agreement for treatment plan.   Emotional support and active listening provided.  Patient provided time to verbalize feelings.               This document has been electronically signed by DANIEL Restrepo   April 12, 2021 14:33 EDT

## 2021-06-01 LAB
EXTERNAL ABO GROUPING: NORMAL
EXTERNAL ANTIBODY SCREEN: POSITIVE
EXTERNAL CHLAMYDIA SCREEN: NORMAL
EXTERNAL GONORRHEA SCREEN: NORMAL
EXTERNAL HEPATITIS B SURFACE ANTIGEN: NEGATIVE
EXTERNAL RH FACTOR: POSITIVE
EXTERNAL SYPHILIS RPR SCREEN: NORMAL
HIV1 P24 AG SERPL QL IA: NORMAL

## 2021-06-03 ENCOUNTER — TRANSCRIBE ORDERS (OUTPATIENT)
Dept: OBSTETRICS AND GYNECOLOGY | Facility: HOSPITAL | Age: 36
End: 2021-06-03

## 2021-06-03 DIAGNOSIS — I10 ESSENTIAL HYPERTENSION: Primary | ICD-10-CM

## 2021-06-03 DIAGNOSIS — Z86.718 HISTORY OF BLOOD CLOTS: ICD-10-CM

## 2021-06-03 DIAGNOSIS — G40.909 MATERNAL SEIZURE DISORDER (HCC): ICD-10-CM

## 2021-06-03 DIAGNOSIS — O99.350 MATERNAL SEIZURE DISORDER (HCC): ICD-10-CM

## 2021-06-09 ENCOUNTER — LAB (OUTPATIENT)
Dept: LAB | Facility: HOSPITAL | Age: 36
End: 2021-06-09

## 2021-06-09 ENCOUNTER — OFFICE VISIT (OUTPATIENT)
Dept: OBSTETRICS AND GYNECOLOGY | Facility: HOSPITAL | Age: 36
End: 2021-06-09

## 2021-06-09 ENCOUNTER — HOSPITAL ENCOUNTER (OUTPATIENT)
Dept: WOMENS IMAGING | Facility: HOSPITAL | Age: 36
Discharge: HOME | End: 2021-06-09

## 2021-06-09 VITALS — SYSTOLIC BLOOD PRESSURE: 140 MMHG | DIASTOLIC BLOOD PRESSURE: 85 MMHG | WEIGHT: 264 LBS | BODY MASS INDEX: 40.15 KG/M2

## 2021-06-09 DIAGNOSIS — M32.9 LUPUS (HCC): ICD-10-CM

## 2021-06-09 DIAGNOSIS — Z86.718 HISTORY OF MATERNAL DEEP VEIN THROMBOSIS (DVT): ICD-10-CM

## 2021-06-09 DIAGNOSIS — Z86.718 HISTORY OF BLOOD CLOTS: ICD-10-CM

## 2021-06-09 DIAGNOSIS — O99.350 MATERNAL SEIZURE DISORDER (HCC): ICD-10-CM

## 2021-06-09 DIAGNOSIS — Z87.59 HISTORY OF MATERNAL DEEP VEIN THROMBOSIS (DVT): Primary | ICD-10-CM

## 2021-06-09 DIAGNOSIS — G40.909 MATERNAL SEIZURE DISORDER (HCC): ICD-10-CM

## 2021-06-09 DIAGNOSIS — Z86.73 HISTORY OF STROKE IN ADULTHOOD: ICD-10-CM

## 2021-06-09 DIAGNOSIS — I10 ESSENTIAL HYPERTENSION: ICD-10-CM

## 2021-06-09 DIAGNOSIS — Z86.718 HISTORY OF MATERNAL DEEP VEIN THROMBOSIS (DVT): Primary | ICD-10-CM

## 2021-06-09 DIAGNOSIS — Z87.59 HISTORY OF MATERNAL DEEP VEIN THROMBOSIS (DVT): ICD-10-CM

## 2021-06-09 PROCEDURE — 99243 OFF/OP CNSLTJ NEW/EST LOW 30: CPT | Performed by: OBSTETRICS & GYNECOLOGY

## 2021-06-09 PROCEDURE — 86235 NUCLEAR ANTIGEN ANTIBODY: CPT

## 2021-06-09 PROCEDURE — 36415 COLL VENOUS BLD VENIPUNCTURE: CPT

## 2021-06-09 PROCEDURE — 76801 OB US < 14 WKS SINGLE FETUS: CPT | Performed by: OBSTETRICS & GYNECOLOGY

## 2021-06-09 PROCEDURE — 76801 OB US < 14 WKS SINGLE FETUS: CPT

## 2021-06-09 RX ORDER — LAMOTRIGINE 100 MG/1
100 TABLET ORAL 2 TIMES DAILY
COMMUNITY

## 2021-06-09 RX ORDER — FOLIC ACID 1 MG/1
1 TABLET ORAL DAILY
COMMUNITY
End: 2021-11-19 | Stop reason: HOSPADM

## 2021-06-09 RX ORDER — PROMETHAZINE HYDROCHLORIDE 25 MG/1
25 TABLET ORAL 3 TIMES DAILY PRN
COMMUNITY
End: 2021-11-19 | Stop reason: HOSPADM

## 2021-06-09 RX ORDER — PRENATAL WITH FERROUS FUM AND FOLIC ACID 3080; 920; 120; 400; 22; 1.84; 3; 20; 10; 1; 12; 200; 27; 25; 2 [IU]/1; [IU]/1; MG/1; [IU]/1; MG/1; MG/1; MG/1; MG/1; MG/1; MG/1; UG/1; MG/1; MG/1; MG/1; MG/1
1 TABLET ORAL DAILY
COMMUNITY
End: 2023-01-26

## 2021-06-09 RX ORDER — PANTOPRAZOLE SODIUM 40 MG/1
40 TABLET, DELAYED RELEASE ORAL DAILY
COMMUNITY
End: 2023-01-26 | Stop reason: SDUPTHER

## 2021-06-09 NOTE — PROGRESS NOTES
Pt states this is an unplanned pregnancy. FOB not involved. Pt denies cramping, lof or vag bleeding. NIPT normal, female. Sees OB 6/28. Sees a rheumotologist for her lupus. Unsure of what her last lupus titers are.

## 2021-06-09 NOTE — PROGRESS NOTES
Documentation of the ultasound findings, images, and interpretations will be available in the patient's [facility] report which is located in the imaging tab in chart review.    Consultation requested on patient by referring doctor.     Thank you for requesting our service to provide consultation to your patient given her advanced maternal age. The patient will be 36 years old at the time of delivery. She was quoted the following age-related risks at term: Risk for Down syndrome 1 in 225, risk for any chromosomal abnormality 1 in 125. Options in genetic testing and genetic screening were discussed with the patient. I noted an option of genetic testing in the 2nd trimester of transabdominal amniocentesis for the determination of fetal chromosomal complement as well as amniotic fluid alpha-fetoprotein for the evaluation of a fetal open neural tube defect. A procedure-related fetal loss rate of 1 in 500 would be quoted with midtrimester amniocentesis. I also discussed the option of analysis of cell-free fetal DNA in maternal blood. I noted this directed analysis measures the relative proportion of chromosomes with the detection rate of fetal Down syndrome quoted as 99% with a false positive rate of less than .1%. Screening for other fetal aneuploidies is also possible but the detection rate is lower with cell-free fetal DNA technology. I then discussed the clinical utility of ultrasound and/or biochemical screening for the detection of fetal aneuploidy as well as fetal open neural tube defects. Further, I have reviewed her self-reported family history and made suggestions as appropriate based on my review.   Patient has already had a cfDNA screen that returned as low risk.  We discussed that this significantly lowered the risk of chromosomal abnormalities but does not eliminate risk.  Amniocentesis was discussed. The risks of the proceedure were discussed including a 1 in 500 risk of proceedure-related pregnancy loss.    Patient will await results of anatomic survey.    Thank you for requesting our service to provide consultation to your patient given her pregnancy complicated by lupus. Systemic lupus erythematosus (SLE) is notoriously variable in its presentation, course and outcome. Over the past several decades, pregnancy outcomes in women with SLE have remarkably improved. Important factors for pregnancy outcome include: whether disease is active at the beginning of the pregnancy, coexistence of other medical or obstetrical disorders, whether antiphospholipid antibodies are detected and absence of active renal involvement as manifest by proteinuria or renal dysfunction. As a general rule during pregnancy, lupus improves in a third of women, remains unchanged in a third and worsens in the remaining third.     Management during pregnancy consists primarily of monitoring maternal clinical and laboratory conditions as well as fetal well-being. Pregnancy-induced thrombocytopenia and proteinuria resemble lupus disease activity and the identification of a lupus flare is confounded by the increase in facial and palmar erythema of normal pregnancy. The sedimentation rate is misleading because of pregnancy-induced hyperfibrinogenemia. Falling levels of complement components C3 and C4 are more likely to be associated with active lupus disease.     Pharmacologic treatments of lupus during pregnancy can include immunosuppressive agents such as azathioprine, antimalarials or glucocorticoid therapy. Although hydroxychloroquine is noted to cross the placenta, it has not been associated with congenital malformations. Further, in randomized studies of hydroxychloroquine versus placebo there has been a reported improvement in outcomes of those receiving hydroxychloroquine therapy. Immunosuppressive agents such as azathioprine are beneficial in controlling active disease and have a good safety record during pregnancy. Cyclophosphamide, however,  is teratogenic and is not recommended for use during pregnancy. For severe lupus flares, I would recommend bolus glucocorticoid steroids such as methylprednisolone.    The pediatrician should be informed of the patient's history such that evaluation for  lupus erythematosus can occur. Specifically, these infants are at risk for complete or incomplete congenital heart block, subacute cutaneous lupus erythematosus lesions, hematologic manifestations including severe thrombocytopenia and rarely transient CNS abnormalities.     SSA and SSB antibodies were ordered.    Thank you for allowing our service to provide consultation to Ms. Anjum. In general, women with a venous thromboembolism in a prior pregnancy, those with an anatomic abnormality that predisposes them to a venous thromboembolism such as May-Thurner syndrome or an event which is estrogen-related are offered thromboprophylaxis during pregnancy and postpartum. As Coumadin crosses the placenta and has been associated with congenital abnormalities, thromboprophylaxis with unfractionated heparin or low molecular weight heparin is the preferred method of thromboprophylaxis during pregnancy. In terms of Lovenox therapy, she should have her platelet count checked initially 5-7 days from the start of therapy and if normal, repeated at least once a month to rule out heparin-induced thrombocytopenia. In addition, she should take at least 2000 mg of calcium daily because of the risk of heparin-induced osteoporosis. I have explained the importance of this to the patient.     Patient had a inferior vena cava filter placed for her DVT as at the time of her DVT she was not able to be anticoagulated due to a hemorrhagic stroke.  Since she has this filter in place anticoagulation is not necessary during this pregnancy as she is hesitant to be anticoagulated with her history of hemorrhagic stroke.  I have recommended that she start on a low-dose aspirin daily to  decrease her risk of DVT.     I spent a total time of 40 minutes with this patient of which greater than 50% was face to face counseling and coordination of care. Questions asked by the patient were answered.

## 2021-06-10 LAB
ENA SS-A AB SER-ACNC: 0.2 AI (ref 0–0.9)
ENA SS-B AB SER-ACNC: <0.2 AI (ref 0–0.9)

## 2021-06-16 ENCOUNTER — OFFICE VISIT (OUTPATIENT)
Dept: FAMILY MEDICINE CLINIC | Facility: CLINIC | Age: 36
End: 2021-06-16

## 2021-06-16 ENCOUNTER — HOSPITAL ENCOUNTER (OUTPATIENT)
Dept: ULTRASOUND IMAGING | Facility: HOSPITAL | Age: 36
Discharge: HOME | End: 2021-06-16
Admitting: NURSE PRACTITIONER

## 2021-06-16 VITALS
WEIGHT: 272.2 LBS | BODY MASS INDEX: 41.25 KG/M2 | HEART RATE: 112 BPM | HEIGHT: 68 IN | SYSTOLIC BLOOD PRESSURE: 132 MMHG | DIASTOLIC BLOOD PRESSURE: 86 MMHG | TEMPERATURE: 97.7 F | OXYGEN SATURATION: 98 %

## 2021-06-16 DIAGNOSIS — R10.11 RIGHT UPPER QUADRANT PAIN: ICD-10-CM

## 2021-06-16 DIAGNOSIS — R10.11 RIGHT UPPER QUADRANT PAIN: Primary | ICD-10-CM

## 2021-06-16 DIAGNOSIS — J30.2 SEASONAL ALLERGIC RHINITIS, UNSPECIFIED TRIGGER: ICD-10-CM

## 2021-06-16 LAB
BASOPHILS # BLD AUTO: 0.02 10*3/MM3 (ref 0–0.2)
BASOPHILS NFR BLD AUTO: 0.2 % (ref 0–1.5)
BILIRUB BLD-MCNC: NEGATIVE MG/DL
CLARITY, POC: CLEAR
COLOR UR: YELLOW
DEPRECATED RDW RBC AUTO: 51.9 FL (ref 37–54)
EOSINOPHIL # BLD AUTO: 0.21 10*3/MM3 (ref 0–0.4)
EOSINOPHIL NFR BLD AUTO: 2.4 % (ref 0.3–6.2)
ERYTHROCYTE [DISTWIDTH] IN BLOOD BY AUTOMATED COUNT: 16 % (ref 12.3–15.4)
GLUCOSE UR STRIP-MCNC: NEGATIVE MG/DL
HCT VFR BLD AUTO: 38.2 % (ref 34–46.6)
HGB BLD-MCNC: 12 G/DL (ref 12–15.9)
IMM GRANULOCYTES # BLD AUTO: 0.06 10*3/MM3 (ref 0–0.05)
IMM GRANULOCYTES NFR BLD AUTO: 0.7 % (ref 0–0.5)
KETONES UR QL: NEGATIVE
LEUKOCYTE EST, POC: ABNORMAL
LYMPHOCYTES # BLD AUTO: 1.86 10*3/MM3 (ref 0.7–3.1)
LYMPHOCYTES NFR BLD AUTO: 21.5 % (ref 19.6–45.3)
MCH RBC QN AUTO: 27.5 PG (ref 26.6–33)
MCHC RBC AUTO-ENTMCNC: 31.4 G/DL (ref 31.5–35.7)
MCV RBC AUTO: 87.6 FL (ref 79–97)
MONOCYTES # BLD AUTO: 0.7 10*3/MM3 (ref 0.1–0.9)
MONOCYTES NFR BLD AUTO: 8.1 % (ref 5–12)
NEUTROPHILS NFR BLD AUTO: 5.81 10*3/MM3 (ref 1.7–7)
NEUTROPHILS NFR BLD AUTO: 67.1 % (ref 42.7–76)
NITRITE UR-MCNC: NEGATIVE MG/ML
NRBC BLD AUTO-RTO: 0 /100 WBC (ref 0–0.2)
PH UR: 6 [PH] (ref 5–8)
PLATELET # BLD AUTO: 193 10*3/MM3 (ref 140–450)
PMV BLD AUTO: 10.5 FL (ref 6–12)
PROT UR STRIP-MCNC: ABNORMAL MG/DL
RBC # BLD AUTO: 4.36 10*6/MM3 (ref 3.77–5.28)
RBC # UR STRIP: ABNORMAL /UL
SP GR UR: 1.03 (ref 1–1.03)
UROBILINOGEN UR QL: NORMAL
WBC # BLD AUTO: 8.66 10*3/MM3 (ref 3.4–10.8)

## 2021-06-16 PROCEDURE — 76700 US EXAM ABDOM COMPLETE: CPT

## 2021-06-16 PROCEDURE — 99214 OFFICE O/P EST MOD 30 MIN: CPT | Performed by: NURSE PRACTITIONER

## 2021-06-16 PROCEDURE — 85025 COMPLETE CBC W/AUTO DIFF WBC: CPT | Performed by: NURSE PRACTITIONER

## 2021-06-16 PROCEDURE — 76700 US EXAM ABDOM COMPLETE: CPT | Performed by: RADIOLOGY

## 2021-06-16 RX ORDER — FEXOFENADINE HCL 180 MG/1
180 TABLET ORAL DAILY
Qty: 30 TABLET | Refills: 5 | Status: SHIPPED | OUTPATIENT
Start: 2021-06-16 | End: 2023-01-26

## 2021-06-16 NOTE — PROGRESS NOTES
Her ultrasound reports as not showing any cause for her pain. Her blood counts are normal as well. Did she get in with her OB?

## 2021-06-16 NOTE — PROGRESS NOTES
"Milind Benítez is a 36 y.o. female.     Chief Complaint   Patient presents with   • Flank Pain     Right   • Foot Pain     Bilateral, worse on Right       She presents with c/o right upper quadrant pain for a couple weeks. If she sits up or lays down it is sharp. Pain is constant. She has been vomiting in the mornings. She is pregnant. 14 weeks. She called the OB and they told her to come to primary care. She c/o difficulty voiding sometimes. She has a history of kidney stones. She has had her gallbladder removed.        The following portions of the patient's history were reviewed and updated as appropriate: allergies, current medications, past family history, past medical history, past social history, past surgical history and problem list.    Review of Systems   Constitutional: Negative for fatigue, fever and unexpected weight change.   HENT: Negative for ear pain, rhinorrhea and sore throat.    Eyes: Negative for visual disturbance.   Respiratory: Negative for cough, chest tightness and shortness of breath.    Cardiovascular: Negative for chest pain, palpitations and leg swelling.   Gastrointestinal: Positive for abdominal pain. Negative for blood in stool, constipation, diarrhea, nausea and vomiting.   Endocrine: Negative for cold intolerance and heat intolerance.   Genitourinary: Positive for difficulty urinating. Negative for dysuria and hematuria.   Musculoskeletal: Negative for arthralgias and myalgias.   Skin: Negative for color change.   Allergic/Immunologic: Negative for environmental allergies.   Hematological: Negative for adenopathy.   Psychiatric/Behavioral: Negative for suicidal ideas. The patient is not nervous/anxious.        Objective     /86 (BP Location: Right arm, Patient Position: Sitting, Cuff Size: Adult)   Pulse 112   Temp 97.7 °F (36.5 °C) (Temporal)   Ht 172.7 cm (67.99\")   Wt 123 kg (272 lb 3.2 oz)   LMP 03/08/2021   SpO2 98%   BMI 41.40 kg/m²     Physical " Exam  Vitals reviewed.   Constitutional:       General: She is not in acute distress.     Appearance: Normal appearance. She is well-developed. She is not diaphoretic.   HENT:      Head: Normocephalic and atraumatic.   Cardiovascular:      Rate and Rhythm: Normal rate and regular rhythm.      Heart sounds: Normal heart sounds, S1 normal and S2 normal. No murmur heard.   No friction rub. No gallop.    Pulmonary:      Effort: Pulmonary effort is normal. No respiratory distress.      Breath sounds: Normal breath sounds.   Abdominal:      General: Bowel sounds are normal. There is no distension.      Palpations: Abdomen is soft.      Tenderness: There is abdominal tenderness in the right upper quadrant. There is no guarding.   Skin:     General: Skin is warm and dry.   Neurological:      Mental Status: She is alert and oriented to person, place, and time.   Psychiatric:         Behavior: Behavior normal.         Thought Content: Thought content normal.         Judgment: Judgment normal.         Assessment/Plan     Problem List Items Addressed This Visit     None      Visit Diagnoses     Right upper quadrant pain    -  Primary    Relevant Orders    US Abdomen Complete    Comprehensive Metabolic Panel    POC Urinalysis Dipstick (Completed)    CBC & Differential          Plan: Get stat ultrasound, cmp, cbc, This is concerning for HELLP syndrome. I personally called her OB/GYN's office Dr. Hudson Shelton and spoke with the nurse who states they spoke with the provider and are waiting to see if they can get her in for a visit today. I explained that am concerned about HELLP syndrome. Nicole has an extensive medical history making her high risk. The nurse verbalized an understanding of the same. Urinalysis has a trace of blood and a small amount of leukocytes. Not enough sample for urine culture. Patient instructed to bring back more urine if she can to send for culture. Plan to call patient with further instructions following  her results. I explained to the patient that she must see her OB. She verbalized an understanding.    @Body mass index is 41.4 kg/m².                Understands disease processes and need for medications.  Understands reasons for urgent and emergent care.  Patient (& family) verbalized agreement for treatment plan.   Emotional support and active listening provided.  Patient provided time to verbalize feelings.               This document has been electronically signed by DANIEL Restrepo   June 16, 2021 10:36 EDT

## 2021-06-17 ENCOUNTER — LAB (OUTPATIENT)
Dept: FAMILY MEDICINE CLINIC | Facility: CLINIC | Age: 36
End: 2021-06-17

## 2021-06-17 ENCOUNTER — TELEPHONE (OUTPATIENT)
Dept: FAMILY MEDICINE CLINIC | Facility: CLINIC | Age: 36
End: 2021-06-17

## 2021-06-17 DIAGNOSIS — R10.11 RIGHT UPPER QUADRANT PAIN: ICD-10-CM

## 2021-06-17 PROCEDURE — 87086 URINE CULTURE/COLONY COUNT: CPT | Performed by: NURSE PRACTITIONER

## 2021-06-17 PROCEDURE — 36415 COLL VENOUS BLD VENIPUNCTURE: CPT | Performed by: NURSE PRACTITIONER

## 2021-06-17 PROCEDURE — 80053 COMPREHEN METABOLIC PANEL: CPT | Performed by: NURSE PRACTITIONER

## 2021-06-17 NOTE — TELEPHONE ENCOUNTER
Attempted to call patient at [phone].  No answer, left voicemail and will keep trying to reach her.

## 2021-06-17 NOTE — TELEPHONE ENCOUNTER
"----- Message from Elissa Valdez sent at 6/17/2021  5:17 AM EDT -----  Regarding: Cancellation of Order # [id number]  Order number [id number] for the procedure COMPREHENSIVE METABOLIC   PANEL [[id number]] has been canceled by Elissa Valdez [[id number]].   This procedure was ordered by you on Jun 16, 2021 for the patient  Livia Benítez [[id number]]. The reason for cancellation was   \"Outpatient Redraw\".  "

## 2021-06-18 LAB
ALBUMIN SERPL-MCNC: 4.1 G/DL (ref 3.5–5.2)
ALBUMIN/GLOB SERPL: 1.6 G/DL
ALP SERPL-CCNC: 56 U/L (ref 39–117)
ALT SERPL W P-5'-P-CCNC: 23 U/L (ref 1–33)
ANION GAP SERPL CALCULATED.3IONS-SCNC: 13.1 MMOL/L (ref 5–15)
AST SERPL-CCNC: 16 U/L (ref 1–32)
BACTERIA SPEC AEROBE CULT: NORMAL
BILIRUB SERPL-MCNC: 0.2 MG/DL (ref 0–1.2)
BUN SERPL-MCNC: 8 MG/DL (ref 6–20)
BUN/CREAT SERPL: 14.8 (ref 7–25)
CALCIUM SPEC-SCNC: 9.6 MG/DL (ref 8.6–10.5)
CHLORIDE SERPL-SCNC: 105 MMOL/L (ref 98–107)
CO2 SERPL-SCNC: 22.9 MMOL/L (ref 22–29)
CREAT SERPL-MCNC: 0.54 MG/DL (ref 0.57–1)
GFR SERPL CREATININE-BSD FRML MDRD: 127 ML/MIN/1.73
GLOBULIN UR ELPH-MCNC: 2.6 GM/DL
GLUCOSE SERPL-MCNC: 105 MG/DL (ref 65–99)
POTASSIUM SERPL-SCNC: 4.4 MMOL/L (ref 3.5–5.2)
PROT SERPL-MCNC: 6.7 G/DL (ref 6–8.5)
SODIUM SERPL-SCNC: 141 MMOL/L (ref 136–145)

## 2021-07-28 ENCOUNTER — HOSPITAL ENCOUNTER (OUTPATIENT)
Dept: WOMENS IMAGING | Facility: HOSPITAL | Age: 36
Discharge: HOME | End: 2021-07-28
Admitting: OBSTETRICS & GYNECOLOGY

## 2021-07-28 ENCOUNTER — OFFICE VISIT (OUTPATIENT)
Dept: OBSTETRICS AND GYNECOLOGY | Facility: HOSPITAL | Age: 36
End: 2021-07-28

## 2021-07-28 VITALS — SYSTOLIC BLOOD PRESSURE: 139 MMHG | DIASTOLIC BLOOD PRESSURE: 74 MMHG | WEIGHT: 277 LBS | BODY MASS INDEX: 42.13 KG/M2

## 2021-07-28 DIAGNOSIS — E66.01 MORBID OBESITY WITH BMI OF 40.0-44.9, ADULT (HCC): ICD-10-CM

## 2021-07-28 DIAGNOSIS — Z34.90 PREGNANCY, UNSPECIFIED GESTATIONAL AGE: ICD-10-CM

## 2021-07-28 DIAGNOSIS — Z87.59 HISTORY OF MATERNAL DEEP VEIN THROMBOSIS (DVT): ICD-10-CM

## 2021-07-28 DIAGNOSIS — R56.9 SEIZURES (HCC): ICD-10-CM

## 2021-07-28 DIAGNOSIS — M32.9 LUPUS (HCC): ICD-10-CM

## 2021-07-28 DIAGNOSIS — Z86.73 HISTORY OF STROKE IN ADULTHOOD: ICD-10-CM

## 2021-07-28 DIAGNOSIS — I10 ESSENTIAL HYPERTENSION: ICD-10-CM

## 2021-07-28 DIAGNOSIS — Z86.718 HISTORY OF MATERNAL DEEP VEIN THROMBOSIS (DVT): ICD-10-CM

## 2021-07-28 DIAGNOSIS — M32.9 LUPUS (HCC): Primary | ICD-10-CM

## 2021-07-28 PROCEDURE — 76811 OB US DETAILED SNGL FETUS: CPT

## 2021-07-28 PROCEDURE — 76811 OB US DETAILED SNGL FETUS: CPT | Performed by: OBSTETRICS & GYNECOLOGY

## 2021-07-28 RX ORDER — ASPIRIN 81 MG/1
81 TABLET ORAL DAILY
Status: ON HOLD | COMMUNITY
End: 2021-11-17

## 2021-07-28 NOTE — PROGRESS NOTES
Pt denies lof, vag bleeding. Reports occasional cramping. Sees OB 8/23. Denies blurred vision, ha or epigastric pain. Pt unable to leave urine sample due to recent void.

## 2021-09-22 ENCOUNTER — OFFICE VISIT (OUTPATIENT)
Dept: OBSTETRICS AND GYNECOLOGY | Facility: HOSPITAL | Age: 36
End: 2021-09-22

## 2021-09-22 ENCOUNTER — HOSPITAL ENCOUNTER (OUTPATIENT)
Dept: WOMENS IMAGING | Facility: HOSPITAL | Age: 36
Discharge: HOME | End: 2021-09-22
Admitting: OBSTETRICS & GYNECOLOGY

## 2021-09-22 VITALS — BODY MASS INDEX: 42.43 KG/M2 | SYSTOLIC BLOOD PRESSURE: 104 MMHG | DIASTOLIC BLOOD PRESSURE: 57 MMHG | WEIGHT: 279 LBS

## 2021-09-22 DIAGNOSIS — R56.9 SEIZURES (HCC): ICD-10-CM

## 2021-09-22 DIAGNOSIS — E66.01 MORBID OBESITY WITH BMI OF 40.0-44.9, ADULT (HCC): ICD-10-CM

## 2021-09-22 DIAGNOSIS — Z34.90 PREGNANCY, UNSPECIFIED GESTATIONAL AGE: ICD-10-CM

## 2021-09-22 DIAGNOSIS — O09.893 PREVIOUS DEEP VEIN THROMBOSIS (DVT) AFFECTING PREGNANCY IN THIRD TRIMESTER: ICD-10-CM

## 2021-09-22 DIAGNOSIS — M32.9 LUPUS (HCC): ICD-10-CM

## 2021-09-22 DIAGNOSIS — M32.9 SYSTEMIC LUPUS ERYTHEMATOSUS, UNSPECIFIED SLE TYPE, UNSPECIFIED ORGAN INVOLVEMENT STATUS (HCC): Primary | ICD-10-CM

## 2021-09-22 DIAGNOSIS — I10 ESSENTIAL HYPERTENSION: ICD-10-CM

## 2021-09-22 DIAGNOSIS — Z86.718 PREVIOUS DEEP VEIN THROMBOSIS (DVT) AFFECTING PREGNANCY IN THIRD TRIMESTER: ICD-10-CM

## 2021-09-22 DIAGNOSIS — R03.0 ELEVATED BLOOD PRESSURE READING: ICD-10-CM

## 2021-09-22 PROBLEM — E66.9 OBESITY (BMI 35.0-39.9 WITHOUT COMORBIDITY): Status: RESOLVED | Noted: 2019-05-28 | Resolved: 2021-09-22

## 2021-09-22 LAB
BILIRUB BLD-MCNC: NEGATIVE MG/DL
CLARITY, POC: CLEAR
COLOR UR: ABNORMAL
GLUCOSE UR STRIP-MCNC: NEGATIVE MG/DL
KETONES UR QL: NEGATIVE
LEUKOCYTE EST, POC: NEGATIVE
NITRITE UR-MCNC: NEGATIVE MG/ML
PH UR: 6 [PH] (ref 5–8)
PROT UR STRIP-MCNC: ABNORMAL MG/DL
RBC # UR STRIP: NEGATIVE /UL
SP GR UR: 1.03 (ref 1–1.03)
UROBILINOGEN UR QL: NORMAL

## 2021-09-22 PROCEDURE — 76816 OB US FOLLOW-UP PER FETUS: CPT | Performed by: OBSTETRICS & GYNECOLOGY

## 2021-09-22 PROCEDURE — 99212 OFFICE O/P EST SF 10 MIN: CPT | Performed by: OBSTETRICS & GYNECOLOGY

## 2021-09-22 PROCEDURE — 76816 OB US FOLLOW-UP PER FETUS: CPT

## 2021-09-22 RX ORDER — PANTOPRAZOLE SODIUM 40 MG/1
TABLET, DELAYED RELEASE ORAL
Status: ON HOLD | COMMUNITY
Start: 2021-09-10 | End: 2021-11-17

## 2021-09-22 RX ORDER — ASPIRIN 81 MG/1
TABLET ORAL TAKE AS DIRECTED
COMMUNITY
End: 2021-11-19 | Stop reason: HOSPADM

## 2021-09-22 RX ORDER — LAMOTRIGINE 100 MG/1
TABLET ORAL EVERY 12 HOURS
Status: ON HOLD | COMMUNITY
Start: 2021-08-23 | End: 2021-11-17

## 2021-09-22 RX ORDER — DULOXETIN HYDROCHLORIDE 60 MG/1
1 CAPSULE, DELAYED RELEASE ORAL TAKE AS DIRECTED
Status: ON HOLD | COMMUNITY
End: 2021-11-17

## 2021-09-22 NOTE — PROGRESS NOTES
"Pt denies all s/s PTL.  Pt reports \"had protein in urine at last OB appt.\"  Will do urine dip here.  Next OB appt 10/05/21.  NIPS normal, female.  "

## 2021-10-20 ENCOUNTER — HOSPITAL ENCOUNTER (OUTPATIENT)
Dept: WOMENS IMAGING | Facility: HOSPITAL | Age: 36
Discharge: HOME | End: 2021-10-20
Admitting: OBSTETRICS & GYNECOLOGY

## 2021-10-20 ENCOUNTER — OFFICE VISIT (OUTPATIENT)
Dept: OBSTETRICS AND GYNECOLOGY | Facility: HOSPITAL | Age: 36
End: 2021-10-20

## 2021-10-20 VITALS — DIASTOLIC BLOOD PRESSURE: 79 MMHG | SYSTOLIC BLOOD PRESSURE: 133 MMHG | WEIGHT: 284 LBS | BODY MASS INDEX: 43.19 KG/M2

## 2021-10-20 DIAGNOSIS — E66.01 MORBID OBESITY WITH BMI OF 40.0-44.9, ADULT (HCC): ICD-10-CM

## 2021-10-20 DIAGNOSIS — M32.9 SYSTEMIC LUPUS ERYTHEMATOSUS, UNSPECIFIED SLE TYPE, UNSPECIFIED ORGAN INVOLVEMENT STATUS (HCC): ICD-10-CM

## 2021-10-20 DIAGNOSIS — R03.0 ELEVATED BLOOD PRESSURE READING: ICD-10-CM

## 2021-10-20 DIAGNOSIS — M32.9 SYSTEMIC LUPUS ERYTHEMATOSUS, UNSPECIFIED SLE TYPE, UNSPECIFIED ORGAN INVOLVEMENT STATUS (HCC): Primary | ICD-10-CM

## 2021-10-20 DIAGNOSIS — Z34.90 PREGNANCY, UNSPECIFIED GESTATIONAL AGE: ICD-10-CM

## 2021-10-20 DIAGNOSIS — Z86.718 PREVIOUS DEEP VEIN THROMBOSIS (DVT) AFFECTING PREGNANCY IN THIRD TRIMESTER: ICD-10-CM

## 2021-10-20 DIAGNOSIS — O09.893 PREVIOUS DEEP VEIN THROMBOSIS (DVT) AFFECTING PREGNANCY IN THIRD TRIMESTER: ICD-10-CM

## 2021-10-20 PROCEDURE — 76816 OB US FOLLOW-UP PER FETUS: CPT | Performed by: OBSTETRICS & GYNECOLOGY

## 2021-10-20 PROCEDURE — 76819 FETAL BIOPHYS PROFIL W/O NST: CPT | Performed by: OBSTETRICS & GYNECOLOGY

## 2021-10-20 PROCEDURE — 76816 OB US FOLLOW-UP PER FETUS: CPT

## 2021-10-20 PROCEDURE — 76819 FETAL BIOPHYS PROFIL W/O NST: CPT

## 2021-10-20 NOTE — PROGRESS NOTES
"Pt denies all s/s PTL, denies other problems.  Pt reports +headache, denies visual changes or epigastric pain.  States \"b/p was up at OB on Monday 10/18/21.  Seeing OB twice weekly with next appt 10/25/21.  NIPS normal female. Pt reports \"already went to rest room and cannot void at this moment.\"  "

## 2021-11-13 ENCOUNTER — HOSPITAL ENCOUNTER (OUTPATIENT)
Facility: HOSPITAL | Age: 36
Discharge: HOME | End: 2021-11-13
Attending: OBSTETRICS & GYNECOLOGY | Admitting: OBSTETRICS & GYNECOLOGY

## 2021-11-13 VITALS — TEMPERATURE: 98.1 F | BODY MASS INDEX: 44.41 KG/M2 | WEIGHT: 293 LBS | RESPIRATION RATE: 15 BRPM | HEIGHT: 68 IN

## 2021-11-13 PROBLEM — O16.9 HYPERTENSION AFFECTING PREGNANCY: Status: ACTIVE | Noted: 2021-11-13

## 2021-11-13 PROCEDURE — 96372 THER/PROPH/DIAG INJ SC/IM: CPT

## 2021-11-13 PROCEDURE — G0463 HOSPITAL OUTPT CLINIC VISIT: HCPCS

## 2021-11-13 PROCEDURE — 25010000002 BETAMETHASONE ACET & SOD PHOS PER 4 MG: Performed by: OBSTETRICS & GYNECOLOGY

## 2021-11-13 PROCEDURE — 59025 FETAL NON-STRESS TEST: CPT

## 2021-11-13 RX ORDER — BETAMETHASONE SODIUM PHOSPHATE AND BETAMETHASONE ACETATE 3; 3 MG/ML; MG/ML
12 INJECTION, SUSPENSION INTRA-ARTICULAR; INTRALESIONAL; INTRAMUSCULAR; SOFT TISSUE EVERY 24 HOURS
Status: DISCONTINUED | OUTPATIENT
Start: 2021-11-13 | End: 2021-11-13 | Stop reason: HOSPADM

## 2021-11-13 RX ADMIN — BETAMETHASONE ACETATE AND BETAMETHASONE SODIUM PHOSPHATE 12 MG: 3; 3 INJECTION, SUSPENSION INTRA-ARTICULAR; INTRALESIONAL; INTRAMUSCULAR; SOFT TISSUE at 17:50

## 2021-11-13 NOTE — NON STRESS TEST
Livia Benítez, a  at 35w5d with an DEMETRICE of 2021, by Last Menstrual Period, was seen at [facility] LABOR DELIVERY for a nonstress test.    Chief Complaint   Patient presents with   • Medication Administration     PRESENTS FOR 2ND CELESTONE INJECTION.  PT STATES SHE HAS OCC UC'S AND DENIES ANY VAG BLEEDING AND NO LEAKING OF FLUID.  PT HAS +FM.       Patient Active Problem List   Diagnosis   • Hypertension   • Migraine   • Seizures (HCC)   • Lupus (HCC)   • Disseminated lupus erythematosus (HCC)   • Atypical chest pain   • Non-rheumatic mitral regurgitation   • Morbid obesity with BMI of 40.0-44.9, adult (HCC)   • Previous deep vein thrombosis (DVT) affecting pregnancy in third trimester   • Hypertension affecting pregnancy       Start Time: 1640  Stop Time: 1700    Interpretation A  Nonstress Test Interpretation A: Reactive (21 : Lois Graham, RN)  Comments A: VERIFIED BYPHOEBE COLEMAN RN (21 : Lois Graham, RN)

## 2021-11-17 ENCOUNTER — OFFICE VISIT (OUTPATIENT)
Dept: OBSTETRICS AND GYNECOLOGY | Facility: HOSPITAL | Age: 36
End: 2021-11-17

## 2021-11-17 ENCOUNTER — HOSPITAL ENCOUNTER (INPATIENT)
Facility: HOSPITAL | Age: 36
LOS: 2 days | Discharge: HOME | End: 2021-11-19
Attending: OBSTETRICS & GYNECOLOGY | Admitting: OBSTETRICS & GYNECOLOGY

## 2021-11-17 ENCOUNTER — DOCUMENTATION (OUTPATIENT)
Dept: OBSTETRICS AND GYNECOLOGY | Facility: HOSPITAL | Age: 36
End: 2021-11-17

## 2021-11-17 ENCOUNTER — HOSPITAL ENCOUNTER (OUTPATIENT)
Dept: WOMENS IMAGING | Facility: HOSPITAL | Age: 36
Discharge: HOME | End: 2021-11-17
Admitting: OBSTETRICS & GYNECOLOGY

## 2021-11-17 ENCOUNTER — ANESTHESIA EVENT (OUTPATIENT)
Dept: LABOR AND DELIVERY | Facility: HOSPITAL | Age: 36
End: 2021-11-17

## 2021-11-17 ENCOUNTER — ANESTHESIA (OUTPATIENT)
Dept: LABOR AND DELIVERY | Facility: HOSPITAL | Age: 36
End: 2021-11-17

## 2021-11-17 DIAGNOSIS — E66.01 MORBID OBESITY WITH BMI OF 40.0-44.9, ADULT (HCC): Primary | ICD-10-CM

## 2021-11-17 DIAGNOSIS — O16.3 HYPERTENSION AFFECTING PREGNANCY IN THIRD TRIMESTER: ICD-10-CM

## 2021-11-17 DIAGNOSIS — R03.0 ELEVATED BLOOD PRESSURE READING: ICD-10-CM

## 2021-11-17 DIAGNOSIS — Z86.718 PREVIOUS DEEP VEIN THROMBOSIS (DVT) AFFECTING PREGNANCY IN THIRD TRIMESTER: ICD-10-CM

## 2021-11-17 DIAGNOSIS — Z34.90 PREGNANCY, UNSPECIFIED GESTATIONAL AGE: ICD-10-CM

## 2021-11-17 DIAGNOSIS — M32.9 SYSTEMIC LUPUS ERYTHEMATOSUS, UNSPECIFIED SLE TYPE, UNSPECIFIED ORGAN INVOLVEMENT STATUS (HCC): ICD-10-CM

## 2021-11-17 DIAGNOSIS — O09.893 PREVIOUS DEEP VEIN THROMBOSIS (DVT) AFFECTING PREGNANCY IN THIRD TRIMESTER: ICD-10-CM

## 2021-11-17 PROBLEM — O14.10 SEVERE PRE-ECLAMPSIA: Status: ACTIVE | Noted: 2021-11-17

## 2021-11-17 LAB
ABO GROUP BLD: NORMAL
ALBUMIN SERPL-MCNC: 3.54 G/DL (ref 3.5–5.2)
ALBUMIN/GLOB SERPL: 1.3 G/DL
ALP SERPL-CCNC: 156 U/L (ref 39–117)
ALT SERPL W P-5'-P-CCNC: 14 U/L (ref 1–33)
ANION GAP SERPL CALCULATED.3IONS-SCNC: 11.7 MMOL/L (ref 5–15)
ANTI-FYA: NORMAL
AST SERPL-CCNC: 14 U/L (ref 1–32)
BASOPHILS # BLD AUTO: 0.01 10*3/MM3 (ref 0–0.2)
BASOPHILS NFR BLD AUTO: 0.1 % (ref 0–1.5)
BILIRUB BLD-MCNC: NEGATIVE MG/DL
BILIRUB SERPL-MCNC: 0.2 MG/DL (ref 0–1.2)
BLD GP AB SCN SERPL QL: POSITIVE
BUN SERPL-MCNC: 10 MG/DL (ref 6–20)
BUN/CREAT SERPL: 23.3 (ref 7–25)
CALCIUM SPEC-SCNC: 9.3 MG/DL (ref 8.6–10.5)
CHLORIDE SERPL-SCNC: 106 MMOL/L (ref 98–107)
CLARITY, POC: CLEAR
CO2 SERPL-SCNC: 19.3 MMOL/L (ref 22–29)
COLOR UR: YELLOW
CREAT SERPL-MCNC: 0.43 MG/DL (ref 0.57–1)
DEPRECATED RDW RBC AUTO: 50.4 FL (ref 37–54)
EOSINOPHIL # BLD AUTO: 0.1 10*3/MM3 (ref 0–0.4)
EOSINOPHIL NFR BLD AUTO: 0.8 % (ref 0.3–6.2)
ERYTHROCYTE [DISTWIDTH] IN BLOOD BY AUTOMATED COUNT: 15.7 % (ref 12.3–15.4)
EXTERNAL GROUP B STREP ANTIGEN: NORMAL
GFR SERPL CREATININE-BSD FRML MDRD: >150 ML/MIN/1.73
GLOBULIN UR ELPH-MCNC: 2.8 GM/DL
GLUCOSE SERPL-MCNC: 94 MG/DL (ref 65–99)
GLUCOSE UR STRIP-MCNC: NEGATIVE MG/DL
HCT VFR BLD AUTO: 42.7 % (ref 34–46.6)
HGB BLD-MCNC: 13.2 G/DL (ref 12–15.9)
IMM GRANULOCYTES # BLD AUTO: 0.2 10*3/MM3 (ref 0–0.05)
IMM GRANULOCYTES NFR BLD AUTO: 1.6 % (ref 0–0.5)
LYMPHOCYTES # BLD AUTO: 1.84 10*3/MM3 (ref 0.7–3.1)
LYMPHOCYTES NFR BLD AUTO: 15.2 % (ref 19.6–45.3)
MCH RBC QN AUTO: 27.3 PG (ref 26.6–33)
MCHC RBC AUTO-ENTMCNC: 30.9 G/DL (ref 31.5–35.7)
MCV RBC AUTO: 88.4 FL (ref 79–97)
MONOCYTES # BLD AUTO: 0.88 10*3/MM3 (ref 0.1–0.9)
MONOCYTES NFR BLD AUTO: 7.2 % (ref 5–12)
NEUTROPHILS NFR BLD AUTO: 75.1 % (ref 42.7–76)
NEUTROPHILS NFR BLD AUTO: 9.11 10*3/MM3 (ref 1.7–7)
NRBC BLD AUTO-RTO: 0 /100 WBC (ref 0–0.2)
PLATELET # BLD AUTO: 173 10*3/MM3 (ref 140–450)
PMV BLD AUTO: 11 FL (ref 6–12)
POTASSIUM SERPL-SCNC: 4.3 MMOL/L (ref 3.5–5.2)
PROT SERPL-MCNC: 6.3 G/DL (ref 6–8.5)
PROT UR STRIP-MCNC: ABNORMAL MG/DL
RBC # BLD AUTO: 4.83 10*6/MM3 (ref 3.77–5.28)
RH BLD: POSITIVE
SARS-COV-2 RNA RESP QL NAA+PROBE: NOT DETECTED
SODIUM SERPL-SCNC: 137 MMOL/L (ref 136–145)
T&S EXPIRATION DATE: NORMAL
WBC NRBC COR # BLD: 12.14 10*3/MM3 (ref 3.4–10.8)

## 2021-11-17 PROCEDURE — 0 MORPHINE PER 10 MG: Performed by: NURSE ANESTHETIST, CERTIFIED REGISTERED

## 2021-11-17 PROCEDURE — 86922 COMPATIBILITY TEST ANTIGLOB: CPT

## 2021-11-17 PROCEDURE — 25010000002 FENTANYL CITRATE (PF) 50 MCG/ML SOLUTION: Performed by: NURSE ANESTHETIST, CERTIFIED REGISTERED

## 2021-11-17 PROCEDURE — 86900 BLOOD TYPING SEROLOGIC ABO: CPT | Performed by: OBSTETRICS & GYNECOLOGY

## 2021-11-17 PROCEDURE — 76816 OB US FOLLOW-UP PER FETUS: CPT | Performed by: OBSTETRICS & GYNECOLOGY

## 2021-11-17 PROCEDURE — 86920 COMPATIBILITY TEST SPIN: CPT

## 2021-11-17 PROCEDURE — 85025 COMPLETE CBC W/AUTO DIFF WBC: CPT | Performed by: OBSTETRICS & GYNECOLOGY

## 2021-11-17 PROCEDURE — 76816 OB US FOLLOW-UP PER FETUS: CPT

## 2021-11-17 PROCEDURE — 76819 FETAL BIOPHYS PROFIL W/O NST: CPT | Performed by: OBSTETRICS & GYNECOLOGY

## 2021-11-17 PROCEDURE — 86901 BLOOD TYPING SEROLOGIC RH(D): CPT | Performed by: OBSTETRICS & GYNECOLOGY

## 2021-11-17 PROCEDURE — 86850 RBC ANTIBODY SCREEN: CPT | Performed by: OBSTETRICS & GYNECOLOGY

## 2021-11-17 PROCEDURE — 0UB70ZZ EXCISION OF BILATERAL FALLOPIAN TUBES, OPEN APPROACH: ICD-10-PCS | Performed by: OBSTETRICS & GYNECOLOGY

## 2021-11-17 PROCEDURE — 25010000002 HYDROMORPHONE PER 4 MG: Performed by: OBSTETRICS & GYNECOLOGY

## 2021-11-17 PROCEDURE — 25010000002 ONDANSETRON PER 1 MG

## 2021-11-17 PROCEDURE — 86870 RBC ANTIBODY IDENTIFICATION: CPT | Performed by: OBSTETRICS & GYNECOLOGY

## 2021-11-17 PROCEDURE — C1889 IMPLANT/INSERT DEVICE, NOC: HCPCS | Performed by: OBSTETRICS & GYNECOLOGY

## 2021-11-17 PROCEDURE — 80053 COMPREHEN METABOLIC PANEL: CPT | Performed by: OBSTETRICS & GYNECOLOGY

## 2021-11-17 PROCEDURE — U0003 INFECTIOUS AGENT DETECTION BY NUCLEIC ACID (DNA OR RNA); SEVERE ACUTE RESPIRATORY SYNDROME CORONAVIRUS 2 (SARS-COV-2) (CORONAVIRUS DISEASE [COVID-19]), AMPLIFIED PROBE TECHNIQUE, MAKING USE OF HIGH THROUGHPUT TECHNOLOGIES AS DESCRIBED BY CMS-2020-01-R: HCPCS | Performed by: OBSTETRICS & GYNECOLOGY

## 2021-11-17 PROCEDURE — 76819 FETAL BIOPHYS PROFIL W/O NST: CPT

## 2021-11-17 PROCEDURE — 25010000002 METOCLOPRAMIDE PER 10 MG: Performed by: OBSTETRICS & GYNECOLOGY

## 2021-11-17 PROCEDURE — 25010000002 ONDANSETRON PER 1 MG: Performed by: NURSE ANESTHETIST, CERTIFIED REGISTERED

## 2021-11-17 PROCEDURE — 0 CEFAZOLIN SODIUM-DEXTROSE 2-3 GM-%(50ML) RECONSTITUTED SOLUTION: Performed by: OBSTETRICS & GYNECOLOGY

## 2021-11-17 PROCEDURE — 25010000002 PHENYLEPHRINE 10 MG/ML SOLUTION: Performed by: NURSE ANESTHETIST, CERTIFIED REGISTERED

## 2021-11-17 DEVICE — CLIP FALLOP FILSHIE TI PR STRL BX/20: Type: IMPLANTABLE DEVICE | Site: FALLOPIAN TUBE | Status: FUNCTIONAL

## 2021-11-17 DEVICE — SEAL HEMO SURG ARISTA/AH ABS/PWDR 3GM: Type: IMPLANTABLE DEVICE | Site: UTERUS | Status: FUNCTIONAL

## 2021-11-17 RX ORDER — PHENYLEPHRINE HYDROCHLORIDE 10 MG/ML
INJECTION INTRAVENOUS AS NEEDED
Status: DISCONTINUED | OUTPATIENT
Start: 2021-11-17 | End: 2021-11-17 | Stop reason: SURG

## 2021-11-17 RX ORDER — NALOXONE HCL 0.4 MG/ML
0.1 VIAL (ML) INJECTION
Status: DISCONTINUED | OUTPATIENT
Start: 2021-11-17 | End: 2021-11-19 | Stop reason: HOSPADM

## 2021-11-17 RX ORDER — PANTOPRAZOLE SODIUM 40 MG/1
40 TABLET, DELAYED RELEASE ORAL
Status: CANCELLED | OUTPATIENT
Start: 2021-11-18

## 2021-11-17 RX ORDER — OXYTOCIN-SODIUM CHLORIDE 0.9% IV SOLN 30 UNIT/500ML 30-0.9/5 UT/ML-%
125 SOLUTION INTRAVENOUS CONTINUOUS PRN
Status: COMPLETED | OUTPATIENT
Start: 2021-11-17 | End: 2021-11-17

## 2021-11-17 RX ORDER — CETIRIZINE HYDROCHLORIDE 10 MG/1
10 TABLET ORAL DAILY
Status: CANCELLED | OUTPATIENT
Start: 2021-11-18

## 2021-11-17 RX ORDER — HYDROMORPHONE HYDROCHLORIDE 1 MG/ML
0.5 INJECTION, SOLUTION INTRAMUSCULAR; INTRAVENOUS; SUBCUTANEOUS
Status: DISCONTINUED | OUTPATIENT
Start: 2021-11-17 | End: 2021-11-19 | Stop reason: HOSPADM

## 2021-11-17 RX ORDER — SODIUM CHLORIDE 0.9 % (FLUSH) 0.9 %
3-10 SYRINGE (ML) INJECTION AS NEEDED
Status: DISCONTINUED | OUTPATIENT
Start: 2021-11-17 | End: 2021-11-17

## 2021-11-17 RX ORDER — OXYTOCIN-SODIUM CHLORIDE 0.9% IV SOLN 30 UNIT/500ML 30-0.9/5 UT/ML-%
250 SOLUTION INTRAVENOUS CONTINUOUS
Status: ACTIVE | OUTPATIENT
Start: 2021-11-17 | End: 2021-11-17

## 2021-11-17 RX ORDER — FENTANYL CITRATE 50 UG/ML
50 INJECTION, SOLUTION INTRAMUSCULAR; INTRAVENOUS
Status: DISCONTINUED | OUTPATIENT
Start: 2021-11-17 | End: 2021-11-17

## 2021-11-17 RX ORDER — HYDROXYCHLOROQUINE SULFATE 200 MG/1
200 TABLET, FILM COATED ORAL 2 TIMES DAILY
Status: CANCELLED | OUTPATIENT
Start: 2021-11-17

## 2021-11-17 RX ORDER — DROPERIDOL 2.5 MG/ML
0.62 INJECTION, SOLUTION INTRAMUSCULAR; INTRAVENOUS ONCE AS NEEDED
Status: DISCONTINUED | OUTPATIENT
Start: 2021-11-17 | End: 2021-11-17

## 2021-11-17 RX ORDER — EPHEDRINE SULFATE 50 MG/ML
10 INJECTION, SOLUTION INTRAVENOUS
Status: DISCONTINUED | OUTPATIENT
Start: 2021-11-17 | End: 2021-11-19 | Stop reason: HOSPADM

## 2021-11-17 RX ORDER — ONDANSETRON 2 MG/ML
4 INJECTION INTRAMUSCULAR; INTRAVENOUS AS NEEDED
Status: DISCONTINUED | OUTPATIENT
Start: 2021-11-17 | End: 2021-11-17

## 2021-11-17 RX ORDER — OXYTOCIN-SODIUM CHLORIDE 0.9% IV SOLN 30 UNIT/500ML 30-0.9/5 UT/ML-%
999 SOLUTION INTRAVENOUS ONCE
Status: DISCONTINUED | OUTPATIENT
Start: 2021-11-17 | End: 2021-11-19 | Stop reason: HOSPADM

## 2021-11-17 RX ORDER — IPRATROPIUM BROMIDE AND ALBUTEROL SULFATE 2.5; .5 MG/3ML; MG/3ML
3 SOLUTION RESPIRATORY (INHALATION) ONCE AS NEEDED
Status: DISCONTINUED | OUTPATIENT
Start: 2021-11-17 | End: 2021-11-17

## 2021-11-17 RX ORDER — BUPIVACAINE HYDROCHLORIDE 7.5 MG/ML
INJECTION, SOLUTION EPIDURAL; RETROBULBAR AS NEEDED
Status: DISCONTINUED | OUTPATIENT
Start: 2021-11-17 | End: 2021-11-17 | Stop reason: SURG

## 2021-11-17 RX ORDER — HYDROXYZINE 50 MG/1
50 TABLET, FILM COATED ORAL EVERY 6 HOURS PRN
Status: DISCONTINUED | OUTPATIENT
Start: 2021-11-17 | End: 2021-11-19 | Stop reason: HOSPADM

## 2021-11-17 RX ORDER — METOCLOPRAMIDE HYDROCHLORIDE 5 MG/ML
10 INJECTION INTRAMUSCULAR; INTRAVENOUS
Status: DISCONTINUED | OUTPATIENT
Start: 2021-11-17 | End: 2021-11-18

## 2021-11-17 RX ORDER — OXYTOCIN-SODIUM CHLORIDE 0.9% IV SOLN 30 UNIT/500ML 30-0.9/5 UT/ML-%
125 SOLUTION INTRAVENOUS CONTINUOUS PRN
Status: DISCONTINUED | OUTPATIENT
Start: 2021-11-17 | End: 2021-11-19 | Stop reason: HOSPADM

## 2021-11-17 RX ORDER — OXYTOCIN-SODIUM CHLORIDE 0.9% IV SOLN 30 UNIT/500ML 30-0.9/5 UT/ML-%
999 SOLUTION INTRAVENOUS ONCE
Status: DISCONTINUED | OUTPATIENT
Start: 2021-11-17 | End: 2021-11-17 | Stop reason: HOSPADM

## 2021-11-17 RX ORDER — POLYETHYLENE GLYCOL 3350 17 G/17G
17 POWDER, FOR SOLUTION ORAL DAILY
Status: DISCONTINUED | OUTPATIENT
Start: 2021-11-18 | End: 2021-11-19 | Stop reason: HOSPADM

## 2021-11-17 RX ORDER — ONDANSETRON 2 MG/ML
INJECTION INTRAMUSCULAR; INTRAVENOUS
Status: COMPLETED
Start: 2021-11-17 | End: 2021-11-17

## 2021-11-17 RX ORDER — OXYTOCIN-SODIUM CHLORIDE 0.9% IV SOLN 30 UNIT/500ML 30-0.9/5 UT/ML-%
SOLUTION INTRAVENOUS
Status: COMPLETED
Start: 2021-11-17 | End: 2021-11-17

## 2021-11-17 RX ORDER — PROMETHAZINE HYDROCHLORIDE 25 MG/1
12.5 TABLET ORAL EVERY 4 HOURS PRN
Status: DISCONTINUED | OUTPATIENT
Start: 2021-11-17 | End: 2021-11-19 | Stop reason: HOSPADM

## 2021-11-17 RX ORDER — DULOXETIN HYDROCHLORIDE 60 MG/1
60 CAPSULE, DELAYED RELEASE ORAL DAILY
Status: CANCELLED | OUTPATIENT
Start: 2021-11-18

## 2021-11-17 RX ORDER — OXYCODONE AND ACETAMINOPHEN 10; 325 MG/1; MG/1
1 TABLET ORAL EVERY 4 HOURS PRN
Status: DISCONTINUED | OUTPATIENT
Start: 2021-11-17 | End: 2021-11-19 | Stop reason: HOSPADM

## 2021-11-17 RX ORDER — OXYTOCIN 10 [USP'U]/ML
INJECTION, SOLUTION INTRAMUSCULAR; INTRAVENOUS AS NEEDED
Status: DISCONTINUED | OUTPATIENT
Start: 2021-11-17 | End: 2021-11-17 | Stop reason: SURG

## 2021-11-17 RX ORDER — ASPIRIN 81 MG/1
81 TABLET ORAL TAKE AS DIRECTED
Status: CANCELLED | OUTPATIENT
Start: 2021-11-18

## 2021-11-17 RX ORDER — CHOLECALCIFEROL (VITAMIN D3) 125 MCG
5 CAPSULE ORAL NIGHTLY
Status: CANCELLED | OUTPATIENT
Start: 2021-11-17

## 2021-11-17 RX ORDER — LAMOTRIGINE 100 MG/1
100 TABLET ORAL 2 TIMES DAILY
Status: CANCELLED | OUTPATIENT
Start: 2021-11-17

## 2021-11-17 RX ORDER — MISOPROSTOL 100 UG/1
600 TABLET ORAL AS NEEDED
Status: DISCONTINUED | OUTPATIENT
Start: 2021-11-17 | End: 2021-11-17

## 2021-11-17 RX ORDER — SODIUM CHLORIDE, SODIUM LACTATE, POTASSIUM CHLORIDE, CALCIUM CHLORIDE 600; 310; 30; 20 MG/100ML; MG/100ML; MG/100ML; MG/100ML
100 INJECTION, SOLUTION INTRAVENOUS ONCE AS NEEDED
Status: COMPLETED | OUTPATIENT
Start: 2021-11-17 | End: 2021-11-17

## 2021-11-17 RX ORDER — OXYCODONE HYDROCHLORIDE AND ACETAMINOPHEN 5; 325 MG/1; MG/1
1 TABLET ORAL ONCE AS NEEDED
Status: DISCONTINUED | OUTPATIENT
Start: 2021-11-17 | End: 2021-11-17

## 2021-11-17 RX ORDER — ONDANSETRON 2 MG/ML
INJECTION INTRAMUSCULAR; INTRAVENOUS AS NEEDED
Status: DISCONTINUED | OUTPATIENT
Start: 2021-11-17 | End: 2021-11-17 | Stop reason: SURG

## 2021-11-17 RX ORDER — SODIUM CHLORIDE, SODIUM LACTATE, POTASSIUM CHLORIDE, CALCIUM CHLORIDE 600; 310; 30; 20 MG/100ML; MG/100ML; MG/100ML; MG/100ML
125 INJECTION, SOLUTION INTRAVENOUS CONTINUOUS
Status: DISCONTINUED | OUTPATIENT
Start: 2021-11-17 | End: 2021-11-19 | Stop reason: HOSPADM

## 2021-11-17 RX ORDER — CEFAZOLIN SODIUM 2 G/50ML
2 SOLUTION INTRAVENOUS ONCE
Status: COMPLETED | OUTPATIENT
Start: 2021-11-17 | End: 2021-11-17

## 2021-11-17 RX ORDER — MAGNESIUM HYDROXIDE 1200 MG/15ML
LIQUID ORAL AS NEEDED
Status: DISCONTINUED | OUTPATIENT
Start: 2021-11-17 | End: 2021-11-19 | Stop reason: HOSPADM

## 2021-11-17 RX ORDER — ONDANSETRON 4 MG/1
4 TABLET, FILM COATED ORAL EVERY 6 HOURS PRN
Status: DISCONTINUED | OUTPATIENT
Start: 2021-11-17 | End: 2021-11-19 | Stop reason: HOSPADM

## 2021-11-17 RX ORDER — ONDANSETRON 2 MG/ML
4 INJECTION INTRAMUSCULAR; INTRAVENOUS EVERY 6 HOURS PRN
Status: DISCONTINUED | OUTPATIENT
Start: 2021-11-17 | End: 2021-11-19 | Stop reason: HOSPADM

## 2021-11-17 RX ORDER — MORPHINE SULFATE 0.5 MG/ML
INJECTION, SOLUTION EPIDURAL; INTRATHECAL; INTRAVENOUS AS NEEDED
Status: DISCONTINUED | OUTPATIENT
Start: 2021-11-17 | End: 2021-11-17 | Stop reason: SURG

## 2021-11-17 RX ORDER — SIMETHICONE 80 MG
80 TABLET,CHEWABLE ORAL 4 TIMES DAILY PRN
Status: DISCONTINUED | OUTPATIENT
Start: 2021-11-17 | End: 2021-11-19 | Stop reason: HOSPADM

## 2021-11-17 RX ORDER — METHYLERGONOVINE MALEATE 0.2 MG/ML
200 INJECTION INTRAVENOUS ONCE AS NEEDED
Status: DISCONTINUED | OUTPATIENT
Start: 2021-11-17 | End: 2021-11-17

## 2021-11-17 RX ORDER — HYDROCORTISONE 25 MG/G
CREAM TOPICAL 3 TIMES DAILY PRN
Status: DISCONTINUED | OUTPATIENT
Start: 2021-11-17 | End: 2021-11-19 | Stop reason: HOSPADM

## 2021-11-17 RX ORDER — FENTANYL CITRATE 50 UG/ML
INJECTION, SOLUTION INTRAMUSCULAR; INTRAVENOUS AS NEEDED
Status: DISCONTINUED | OUTPATIENT
Start: 2021-11-17 | End: 2021-11-17 | Stop reason: SURG

## 2021-11-17 RX ORDER — CARBOPROST TROMETHAMINE 250 UG/ML
250 INJECTION, SOLUTION INTRAMUSCULAR AS NEEDED
Status: DISCONTINUED | OUTPATIENT
Start: 2021-11-17 | End: 2021-11-17

## 2021-11-17 RX ORDER — PROMETHAZINE HYDROCHLORIDE 25 MG/1
25 TABLET ORAL 3 TIMES DAILY PRN
Status: CANCELLED | OUTPATIENT
Start: 2021-11-17

## 2021-11-17 RX ORDER — METOCLOPRAMIDE 10 MG/1
10 TABLET ORAL ONCE
Status: DISCONTINUED | OUTPATIENT
Start: 2021-11-17 | End: 2021-11-19 | Stop reason: HOSPADM

## 2021-11-17 RX ORDER — MEPERIDINE HYDROCHLORIDE 25 MG/ML
12.5 INJECTION INTRAMUSCULAR; INTRAVENOUS; SUBCUTANEOUS
Status: DISCONTINUED | OUTPATIENT
Start: 2021-11-17 | End: 2021-11-17

## 2021-11-17 RX ORDER — OXYCODONE HYDROCHLORIDE AND ACETAMINOPHEN 5; 325 MG/1; MG/1
1 TABLET ORAL EVERY 4 HOURS PRN
Status: DISCONTINUED | OUTPATIENT
Start: 2021-11-17 | End: 2021-11-19 | Stop reason: HOSPADM

## 2021-11-17 RX ORDER — PRENATAL VIT/IRON FUM/FOLIC AC 27MG-0.8MG
1 TABLET ORAL DAILY
Status: CANCELLED | OUTPATIENT
Start: 2021-11-18

## 2021-11-17 RX ORDER — FOLIC ACID 1 MG/1
1 TABLET ORAL DAILY
Status: CANCELLED | OUTPATIENT
Start: 2021-11-18

## 2021-11-17 RX ORDER — HYDROXYCHLOROQUINE SULFATE 200 MG/1
200 TABLET, FILM COATED ORAL 2 TIMES DAILY
Status: DISCONTINUED | OUTPATIENT
Start: 2021-11-17 | End: 2021-11-19 | Stop reason: HOSPADM

## 2021-11-17 RX ADMIN — SODIUM CHLORIDE, POTASSIUM CHLORIDE, SODIUM LACTATE AND CALCIUM CHLORIDE 100 ML/HR: 600; 310; 30; 20 INJECTION, SOLUTION INTRAVENOUS at 17:35

## 2021-11-17 RX ADMIN — MORPHINE SULFATE 2.5 MG: 0.5 INJECTION, SOLUTION EPIDURAL; INTRATHECAL; INTRAVENOUS at 17:01

## 2021-11-17 RX ADMIN — ONDANSETRON 4 MG: 2 INJECTION INTRAMUSCULAR; INTRAVENOUS at 19:30

## 2021-11-17 RX ADMIN — MORPHINE SULFATE 4.6 MG: 0.5 INJECTION, SOLUTION EPIDURAL; INTRATHECAL; INTRAVENOUS at 16:50

## 2021-11-17 RX ADMIN — EPHEDRINE SULFATE 20 MG: 50 INJECTION, SOLUTION INTRAVENOUS at 16:37

## 2021-11-17 RX ADMIN — OXYTOCIN 20 UNITS: 10 INJECTION, SOLUTION INTRAMUSCULAR; INTRAVENOUS at 16:49

## 2021-11-17 RX ADMIN — ONDANSETRON 8 MG: 2 INJECTION INTRAMUSCULAR; INTRAVENOUS at 16:26

## 2021-11-17 RX ADMIN — FENTANYL CITRATE 75 MCG: 50 INJECTION, SOLUTION INTRAMUSCULAR; INTRAVENOUS at 16:50

## 2021-11-17 RX ADMIN — SODIUM CHLORIDE, POTASSIUM CHLORIDE, SODIUM LACTATE AND CALCIUM CHLORIDE 1000 ML: 600; 310; 30; 20 INJECTION, SOLUTION INTRAVENOUS at 15:14

## 2021-11-17 RX ADMIN — EPHEDRINE SULFATE 10 MG: 50 INJECTION INTRAVENOUS at 18:21

## 2021-11-17 RX ADMIN — SODIUM CHLORIDE, POTASSIUM CHLORIDE, SODIUM LACTATE AND CALCIUM CHLORIDE 125 ML/HR: 600; 310; 30; 20 INJECTION, SOLUTION INTRAVENOUS at 13:23

## 2021-11-17 RX ADMIN — BUPIVACAINE HYDROCHLORIDE 2 ML: 7.5 INJECTION, SOLUTION EPIDURAL; RETROBULBAR at 16:32

## 2021-11-17 RX ADMIN — HYDROMORPHONE HYDROCHLORIDE 0.5 MG: 1 INJECTION, SOLUTION INTRAMUSCULAR; INTRAVENOUS; SUBCUTANEOUS at 20:42

## 2021-11-17 RX ADMIN — EPHEDRINE SULFATE 10 MG: 50 INJECTION, SOLUTION INTRAVENOUS at 16:40

## 2021-11-17 RX ADMIN — CEFAZOLIN SODIUM 2 G: 2 SOLUTION INTRAVENOUS at 16:26

## 2021-11-17 RX ADMIN — OXYTOCIN-SODIUM CHLORIDE 0.9% IV SOLN 30 UNIT/500ML 30 UNITS: 30-0.9/5 SOLUTION at 17:23

## 2021-11-17 RX ADMIN — PHENYLEPHRINE HYDROCHLORIDE 100 MCG: 10 INJECTION INTRAVENOUS at 17:09

## 2021-11-17 RX ADMIN — METOCLOPRAMIDE 10 MG: 5 INJECTION, SOLUTION INTRAMUSCULAR; INTRAVENOUS at 20:34

## 2021-11-17 RX ADMIN — EPHEDRINE SULFATE 20 MG: 50 INJECTION, SOLUTION INTRAVENOUS at 16:44

## 2021-11-17 RX ADMIN — FENTANYL CITRATE 25 MCG: 50 INJECTION, SOLUTION INTRAMUSCULAR; INTRAVENOUS at 16:32

## 2021-11-17 RX ADMIN — MORPHINE SULFATE 0.4 MG: 0.5 INJECTION EPIDURAL; INTRATHECAL; INTRAVENOUS at 16:32

## 2021-11-17 RX ADMIN — SODIUM CHLORIDE, POTASSIUM CHLORIDE, SODIUM LACTATE AND CALCIUM CHLORIDE: 600; 310; 30; 20 INJECTION, SOLUTION INTRAVENOUS at 16:26

## 2021-11-17 RX ADMIN — MORPHINE SULFATE 2.5 MG: 0.5 INJECTION, SOLUTION EPIDURAL; INTRATHECAL; INTRAVENOUS at 16:56

## 2021-11-17 RX ADMIN — PHENYLEPHRINE HYDROCHLORIDE 200 MCG: 10 INJECTION INTRAVENOUS at 17:16

## 2021-11-17 RX ADMIN — PHENYLEPHRINE HYDROCHLORIDE 100 MCG: 10 INJECTION INTRAVENOUS at 16:45

## 2021-11-17 NOTE — ANESTHESIA PROCEDURE NOTES
Spinal Block      Patient location during procedure: OB  Indication:at surgeon's request, post-op pain management and procedure for pain  Performed By  CRNA: Johnie Loving CRNA  Preanesthetic Checklist  Completed: patient identified, IV checked, site marked, risks and benefits discussed, surgical consent, monitors and equipment checked, pre-op evaluation and timeout performed  Spinal Block Prep:  Patient Position:sitting  Sterile Tech:cap, gloves, mask and sterile barriers  Prep:Betadine  Patient Monitoring:blood pressure monitoring, continuous pulse oximetry and EKG  Spinal Block Procedure  Approach:midline  Guidance:landmark technique and palpation technique  Location:L4-L5  Needle Type:Quincke  Needle Gauge:22 G  Placement of Spinal needle event:cerebrospinal fluid aspirated  Paresthesia: no  Fluid Appearance:clear     Post Assessment  Patient Tolerance:patient tolerated the procedure well with no apparent complications  Complications no  Additional Notes  L4/5 with 25g Quinke unablet to pass for three attempts.   Convert to 22g on first attempt with ease.

## 2021-11-17 NOTE — H&P
ELAINE Jorge  Obstetric History and Physical    No chief complaint on file.      Subjective     Patient is a 36 y.o. female  currently at 36w2d, who presents from maternal-fetal medicine today.  I have discussed with Dr. Jevon Morillo who recommends delivery for likely severe preeclampsia.  Patient has complicated history of chronic hypertension, lupus, remote history of DVT and hemorrhagic stroke, currently has Isabella filter in place.  Patient states that she is not to push and was scheduled for  next week and desires to proceed in this manner.  Patient is desiring permanent sterilization.  She complains of headache and visual changes today.    Her prenatal care is complicated by  hypertension  chronic hypertension with superimposed pre-eclampsia and desires sterlization  valid consents currently available.  Her previous obstetric/gynecological history is noted for Lupus, remote history of DVT, remote history of hemorrhagic stroke, Isabella filter in place..    The following portions of the patients history were reviewed and updated as appropriate: current medications, allergies, past medical history, past surgical history, past family history, past social history and problem list .       Prenatal Information:   Maternal Prenatal Labs  Blood Type No results found for: ABO   Rh Status No results found for: RH   Antibody Screen No results found for: ABSCRN   Rapid Urine Drug Screen No results found for: AMPMETHU, BARBITSCNUR, LABBENZSCN, LABMETHSCN, LABOPIASCN, THCURSCR, COCAINEUR, AMPHETSCREEN, PROPOXSCN, BUPRENORSCNU, METAMPSCNUR, OXYCODONESCN, TRICYCLICSCN   Group B Strep Culture No results found for: GBSANTIGEN           External Prenatal Results     Pregnancy Outside Results - Transcribed From Office Records - See Scanned Records For Details     Test Value Date Time    ABO ^ A  21     Rh ^ Positive  21     Antibody Screen ^ Positive  21     Varicella IgG       Rubella       Hgb   12.0 g/dL 21 1055       13.4 g/dL 21 1302    Hct  38.2 % 21 1055       41.6 % 21 1302    Glucose Fasting GTT       Glucose Tolerance Test 1 hour       Glucose Tolerance Test 3 hour       Gonorrhea (discrete) ^ NEG  21     Chlamydia (discrete) ^ NEG  21     RPR ^ Non-Reactive  21     VDRL       Syphilis Antibody       HBsAg ^ Negative  21     Herpes Simplex Virus PCR       Herpes Simplex VIrus Culture       HIV ^ Non-Reactive  21     Hep C RNA Quant PCR       Hep C Antibody  NonReactive  03/22/15 1103    AFP       Group B Strep       GBS Susceptibility to Clindamycin       GBS Susceptibility to Erythromycin       Fetal Fibronectin       Genetic Testing, Maternal Blood             Drug Screening     Test Value Date Time    Urine Drug Screen       Amphetamine Screen       Barbiturate Screen       Benzodiazepine Screen       Methadone Screen       Phencyclidine Screen       Opiates Screen       THC Screen       Cocaine Screen       Propoxyphene Screen       Buprenorphine Screen       Methamphetamine Screen       Oxycodone Screen       Tricyclic Antidepressants Screen             Legend    ^: Historical                          Past OB History:     OB History    Para Term  AB Living   1 0 0 0 0 0   SAB IAB Ectopic Molar Multiple Live Births   0 0 0 0 0 0      # Outcome Date GA Lbr Max/2nd Weight Sex Delivery Anes PTL Lv   1 Current                Past Medical History: Past Medical History:   Diagnosis Date   • Arthritis    • Blood clots in brain       ALSO ONE IN LEG IN .   • Dental bridge present    • Isabella filter in place     SINCE    • History of transfusion    • History of unsteady gait     SINCE STROKE   • Hx of trichomoniasis    • Hypertension 2003    CHTN   • Kidney stone     ,    • Lupus (HCC)    • Migraine    • Seizures (HCC)     LAST ONE    • Stroke (HCC)           Past Surgical History Past  Surgical History:   Procedure Laterality Date   • ABDOMINAL SURGERY     • EXTRACORPOREAL SHOCK WAVE LITHOTRIPSY (ESWL) Left 2017    Procedure: EXTRACORPOREAL SHOCKWAVE LITHOTRIPSY, FLEXIBLE URETEROSCOPY;  Surgeon: Driss Wharton MD;  Location: [facility] OR;  Service:    • GALLBLADDER SURGERY     • KIDNEY STONE SURGERY      ,    • MOUTH SURGERY      wisdom teeth   • OTHER SURGICAL HISTORY      IVC filter      Family History: Family History   Problem Relation Age of Onset   • Diabetes Father    • Thyroid disease Mother       Social History:  reports that she has never smoked. She has never used smokeless tobacco.   reports previous alcohol use.   reports no history of drug use.        Review of Systems      Objective     Vital Signs Range for the last 24 hours  Temperature: Temp:  [98.3 °F (36.8 °C)] 98.3 °F (36.8 °C)   Temp Source: Temp src: Oral   BP: BP: (141)/(90) 141/90   Pulse: Heart Rate:  [117] 117   Respirations: Resp:  [18] 18   Weight: Weight:  [133 kg (293 lb 9.6 oz)] 133 kg (293 lb 9.6 oz)     Physical Examination: General appearance - alert, well appearing, and in no distress  Chest - clear to auscultation, no wheezes, rales or rhonchi, symmetric air entry  Heart - normal rate and regular rhythm  Abdomen - soft, nontender, nondistended, no masses or organomegaly  Extremities - peripheral pulses normal, no pedal edema, no clubbing or cyanosis                              Assessment/Plan       * No active hospital problems. *      Assessment & Plan    Assessment/Plan:  1.  Intrauterine pregnancy at 36w2d weeks gestation with reactive fetal status.    2.  Severe superimposed preeclampsia-patient stable.  Per maternal-fetal medicine delivery is recommended today.  Will alert nursery.  Patient counseled for delivery will proceed with  section secondary to [place] filter placement and history of hemorrhagic stroke with previously preplanned  section.  Risks and  benefit of surgery are reviewed with patient all questions were answered she admits understanding and desires to proceed she desires to proceed with tubal as well risk of regret permanence and ectopic are reviewed patient missed understanding and desires to proceed with sterilization.  3.  History of DVT remotely, hemorrhagic stroke, and current Isabella filter in place-see #2 above.      Claudine Taylor DO  11/17/2021  12:43 EST

## 2021-11-17 NOTE — PROGRESS NOTES
Maternal Child Initial Intake    Patient Name: Livia Benítez     Preferred Name: Livia     YOB: 1985     Patient E-mail Address: [email or web address]                Patient gives permission for information/resources to be emailed: yes    Currently Employed: No    How well do you speak English? Very Well     Services: No    Language Spoken/Preferred English    Willingness to participate in [employer]: yes    Home Phone: [phone]  Cell Phone: [phone]  Alternate/Work/School Phone: None  Best Time for Contacting: anytime  Best Method for Contacting: Home  May we contact you by phone: yes  May we contact you by mail: yes  Highest Level of Education to Date: High school diploma    Professional Contacts  Type of Provider Name Next Appointment   OB/GYN Provider:     Primary Care Provider:       Dental Provider:     Speciality Provider:      Pediatrician Chosen         Have you seen dentist in last 6 months: [] YES    [] NO    Other Providers/Services Presently Utilizing:   WIC (Women, Infant, Children)  Outpatient Care    Pregnancy Confirmed: Yes  Patient's last menstrual period was 03/08/2021. Patient's last menstrual period was 03/08/2021.   DEMETRICE: 12/13/2021 Based Upon Other  Feeding Plan: [] Breast [x] Bottle  Current Pregnancy Related Symptoms, Concerns, or Questions: None  Allergies   Allergen Reactions   • Dilantin [Phenytoin] Other (See Comments)     Pt unsure   • Ibuprofen Other (See Comments)     Patient states cannot take due to kidney function.       Prior to Admission medications    Medication Sig Start Date End Date Taking? Authorizing Provider   aspirin (Antoine Aspirin EC Low Dose) 81 MG EC tablet Take  by mouth Take As Directed.    ProviderJuan Diego MD   aspirin 81 MG EC tablet Take 81 mg by mouth Daily.    ProviderJuan Diego MD   doxylamine (UNISOM) 25 MG tablet Take 50 mg by mouth At Night As Needed for Sleep.    ProviderJuan Diego MD   DULoxetine (CYMBALTA) 60  MG capsule Take 1 capsule by mouth Daily. 5/10/19   Shivani Box APRN   DULoxetine (Cymbalta) 60 MG capsule Take 1 capsule by mouth Take As Directed.    Juan Diego Teran MD   LW-K7-L64-D-Omega 3-Phytoster (ANIMI-3/VITAMIN D PO) Take 50 mcg by mouth Daily.    Juan Diego Teran MD   fexofenadine (Allergy Relief) 180 MG tablet Take 1 tablet by mouth Daily. 21   Shivani Box APRN   folic acid (FOLVITE) 1 MG tablet Take 1 mg by mouth Daily.    Juan Diego Teran MD   hydroxychloroquine (PLAQUENIL) 200 MG tablet Take 200 mg by mouth 2 (Two) Times a Day. 16   Juan Diego Teran MD   lamoTRIgine (LaMICtal) 100 MG tablet Take 100 mg by mouth 2 (Two) Times a Day.    Juan Diego Teran MD   lamoTRIgine (LaMICtal) 100 MG tablet Take  by mouth Every 12 (Twelve) Hours. 21   Juan Diego Teran MD   melatonin 5 MG tablet tablet Take 5 mg by mouth.    Juan Diego Teran MD   omeprazole (priLOSEC) 40 MG capsule Take 40 mg by mouth Daily. 21   Juan Diego Teran MD   pantoprazole (PROTONIX) 40 MG EC tablet Take 40 mg by mouth Daily.    Juan Diego Teran MD   pantoprazole (PROTONIX) 40 MG EC tablet TAKE 1 TABLET BY MOUTH EVERY DAY 9/10/21   Juan Diego Teran MD   Prenatal Vit-Fe Fumarate-FA (Prenatal 27-1) 27-1 MG tablet tablet Take 1 tablet by mouth Daily.    Juan Diego Teran MD   promethazine (PHENERGAN) 25 MG tablet Take 25 mg by mouth Every 6 (Six) Hours As Needed for Nausea or Vomiting.    Juan Diego Teran MD   vitamin D (ERGOCALCIFEROL) 85457 units capsule capsule Take 50,000 Units by mouth 1 (One) Time Per Week.    Juan Diego Teran MD      OB/GYN Specific History:   GHTN and Other Lupus    Patient's last menstrual period was 2021.   Estimated Date of Delivery: 21   Pregnancy History:   Social History:   Sexually Active: Yes Partners: Male  Birth Control/Protection Post Delivery: undecided    Pregnancy History:  Current  Pregnancy Baby Sex: Unknown  Date: GA (wks) Birth Wt Sex Del Type Place of Del Comments/Complications Living (Y/N)                                                                           Past Medical History:   Diagnosis Date   • Arthritis    • Blood clots in brain     2003  ALSO ONE IN LEG IN 2003.   • Dental bridge present    • Isabella filter in place     SINCE 2003   • History of transfusion 2003   • History of unsteady gait     SINCE STROKE   • Hx of trichomoniasis 2016   • Hypertension 2003    CHTN   • Kidney stone     2008, 2015   • Lupus (HCC) 2002   • Migraine    • Seizures (HCC)     LAST ONE 2003   • Stroke (HCC)     2003      Past Surgical History:   Procedure Laterality Date   • ABDOMINAL SURGERY     • EXTRACORPOREAL SHOCK WAVE LITHOTRIPSY (ESWL) Left 8/18/2017    Procedure: EXTRACORPOREAL SHOCKWAVE LITHOTRIPSY, FLEXIBLE URETEROSCOPY;  Surgeon: Driss Wharton MD;  Location: [facility] OR;  Service:    • GALLBLADDER SURGERY  2008   • KIDNEY STONE SURGERY      2008, 2015   • MOUTH SURGERY  2007    wisdom teeth   • OTHER SURGICAL HISTORY  2003    IVC filter      Implants: none  Family History   Problem Relation Age of Onset   • Diabetes Father    • Thyroid disease Mother         Social History:  Smoking Status: Not Currently  Cigarettes   Passive Exposure: yes  Counseling Given: yes  Smokeless Tobacco: Never   Alcohol Use: No   Drinks/wk: 0   Substance Use History: No  Substances Used & Use/Wk: 0    [place] Domestic Violence/Abuse Screening  Within the last year, have you been:  Humiliated or emotionally abused in other ways by your partner or ex-partner no  Afraid of your partner or ex-partner no  Raped or forced to have any kind of sexual activity by your partner or ex-partner no  Kicked, hit, slapped, or otherwise physically hurt by your partner or ex-partner no  Feels Unsafe at home or work/school: no  Feels Threatened by Someone no  Does anyone try to keep you from having contact with others/doing  things outside your home: no  History of physical, mental, emotional, financial abuse/neglect: no    Spiritual, Cultural, Religous, Value Awareness  Any Spiritual, Cultural, or Advent Beliefs/Practices/Values that we need to be mindful of throughout your maternity experience: no    Resource/Environmental Concerns:  Current Living Arrangement: home/apt/condo  Resource/Environmental Concerns:   None    Disability/Function:  Do you have:  Difficulty Hearing or Deaf: no  Difficulty Seeing or Blind: no  Difficulty Concentrating or with Decision-Making: no  Difficulty Communicating: no  Difficulty with Comprehension/Understanding of Information Provided: no  Difficulty with Performing Activities of Daily Living: no    Accommodations/Assistive Devices Utilized (e.g. hearing aids, sign language, braille, service animal, /aide, etc.): None    Equipment Presently Utilized/Available at Home: Other None  Education:   Preferred Language for Discussing Healthcare: English  Ability to Read: yes  Ability to Write: yes    Preferred learning method: listening, reading and demonstration  Learning Barriers: none  Topic Education Information Comments        Hospital Education Programs [] Provided                         [] Acknowledged                [] Refused    [facility] Maternal-Child Department [] Provided                         [] Acknowledged                [] Refused    Signs or Symptoms to Report [] Provided                         [] Acknowledged                [] Refused    Other:  [] Provided                         [] Acknowledged                [] Refused    Comments:       Significant Other/Support Person: Name:Bety Benítez                          Relationship: Mother   Do you have MyChart?  [x] YES    [] NO   Specific Resources Provided and Method: How to find a pediatrician, How to find a primary care provider, Insurance Benefits/Incentives, WIC Benefits Sent via: [employer]    Do you have the  Drive [employer]ha?  [] YES    [] NO     Intake Summary   [x] Intake completed, will follow-up with patient:yes  [] Discussed community resources and information provided or assisted with appointments (see notes)  [] Other, including any provider notifications (see notes)  [] Declines Project [place] Participation  Diana Trevino, RN   Maternal Nurse Navigator

## 2021-11-17 NOTE — OP NOTE
Primary Low Transverse  Section with Bilateral Tubal Ligation Operation Note    Livia Benítez  [birth year]21  Gestational Age: 36w2d    Pre-op Diagnosis:   Severe Super-imposed pre-eclampsia    Post-op Diagnosis:     FEDERICO    Procedure(s):   SECTION PRIMARY WITH TUBAL     Surgeon(s):  Claudine Taylor DO    Anesthesia: Spinal    Staff:   Circulator: Jeri Bajwa RN  Scrub Person: Bernie Falcon  Baby Nurse: Sharonda Paula RN; Gretel Richard RN; Sonia Gonzalez RN  Assistant: Laura Jordan    Estimated Blood Loss: 800 cc    Urine Output:: * No values recorded between 2021  4:26 PM and 2021  5:28 PM *    IVF: Intravenous fluids were administered, normal saline      Grafts/Implants: NA    Procedure     The patient was placed in the dorsal lithotomy position.  She was then prepped and draped in the normal sterile fashion.  Spinal anesthesia was found to be adequate.  A pfannenstiel skin incision was made with the scalpel and carried down to the underlying layer of fascia.  The fascia was then incised and extended bilaterally with the doan scissors.  The superior aspect of this incision was grasped with kocher clamps x 2, tented upward and dissected off with doan scissors.  The clamps were removed and placed on the inferior aspect of the incision, tented upward and dissected off the rectus muscle to the pubic simphysis.  The rectus muscle was then  in the midline.  The peritoneum was identified, grasped with hemostats x 2 and entered sharply with Metzenbaum scissors.  This incision was then extended in a superiorly and inferiorly.  The bladder blade was then inserted.  The vesicouterine peritoneum was grasped and entered sharply with Metzenbaum scissors and extended bilaterally.  The bladder blade was re-inserted.  A transverse uterine incision was then made with the scalpel and extended in a cranio-caudal  Blunt fashion.  Bladder blade was removed. The infants head was  then grasped and brought through the hysterotomy.  The mouth and nares was bulb suctioned.  The shoulders and body were then delivered atraumatically.  The cord was then doubly clamped and cut and the infant was handed off to the nursing staff with vigorous cry and movement of all extremities.  Cord blood and gas were obtained.  The placenta was manually extracted.  The uterus was exteriorized and cleared of all clot and debris.  There was a vessel in the placental bed that was bleeding pressure was held hemostasis was noted and Yunier was also placed over it precautionary.  The hysterotomy was grasped with Allis clamps at the edge and repaired with 1-0 Monocryl in a running and locked fashion.  A second layer of the same suture was made.  The posterior cul-de-sac was irrigated and suctioned.  Attention was then turned to the tubal portion of this procedure.  The right tube was grasped and a Filshie clip x2 was placed completely traversing the tube.  The same procedure was performed on the contralateral side. The uterine incision  was hemostatic and the uterus was returned to the abdomen.  The gutters were cleared of all clot and debris.  Second look at the uterine incision was hemostatic.  The rectus muscle was visualized and hemostatic.  Fascia was then grasped with kocher clamps and re-approximated with 0 vicryl in a single layer and running fashion.  The subcutaneous layer was irrigated and hemostatic with Bovie cautery.  This layer was then re-approximated with 2-0 plain gut stitch.  The skin was re-approximated staples.  Sterile dressing was applied to the incision.  Uterus was found to be firm and at the umbilicus.  Sponge, lap and needle counts were correct.  Pt taken to recovery in stable condition.     APGARS  8   9            GENDER  Female    Complications: None    Claudine Taylor DO     Date: 11/17/2021  Time: 18:31 EST

## 2021-11-17 NOTE — ANESTHESIA PREPROCEDURE EVALUATION
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   Substance History      OB/GYN    (+) Pregnant, Preeclampsia, pregnancy induced hypertension        Other   arthritis,          Phys Exam Other: Pregnant Belly                Anesthesia Plan    ASA 3     spinal       Anesthetic plan, all risks, benefits, and alternatives have been provided, discussed and informed consent has been obtained with: patient.    Plan discussed with CRNA.

## 2021-11-17 NOTE — ANESTHESIA POSTPROCEDURE EVALUATION
Patient: Livia Benítez    Procedure Summary     Date: 21 Room / Location:  [facility]   [facility]    Anesthesia Start:  Anesthesia Stop:     Procedure:  SECTION PRIMARY WITH TUBAL (Bilateral Abdomen) Diagnosis:     Surgeons: Claudine Taylor DO Provider: Yossi Moran MD    Anesthesia Type: spinal ASA Status: 3          Anesthesia Type: spinal    Vitals  Vitals Value Taken Time   BP 72/28 21 1738   Temp     Pulse 94 21 1738   Resp     SpO2 90 % 21   Vitals shown include unvalidated device data.        Post Anesthesia Care and Evaluation    Patient location during evaluation: PACU  Patient participation: complete - patient participated  Level of consciousness: awake  Pain score: 0  Pain management: adequate  Airway patency: patent  Anesthetic complications: No anesthetic complications  PONV Status: controlled  Cardiovascular status: hemodynamically stable  Respiratory status: nasal cannula  Hydration status: acceptable

## 2021-11-17 NOTE — PROGRESS NOTES
Documentation of the ultasound findings, images, and interpretations will be available in the patient's Viewpoint report located in the Chart Review Imaging tab in [facility].

## 2021-11-18 LAB
BASOPHILS # BLD AUTO: 0.03 10*3/MM3 (ref 0–0.2)
BASOPHILS NFR BLD AUTO: 0.2 % (ref 0–1.5)
DEPRECATED RDW RBC AUTO: 52.1 FL (ref 37–54)
EOSINOPHIL # BLD AUTO: 0.09 10*3/MM3 (ref 0–0.4)
EOSINOPHIL NFR BLD AUTO: 0.6 % (ref 0.3–6.2)
ERYTHROCYTE [DISTWIDTH] IN BLOOD BY AUTOMATED COUNT: 16 % (ref 12.3–15.4)
HCT VFR BLD AUTO: 42.7 % (ref 34–46.6)
HGB BLD-MCNC: 13.2 G/DL (ref 12–15.9)
IMM GRANULOCYTES # BLD AUTO: 0.24 10*3/MM3 (ref 0–0.05)
IMM GRANULOCYTES NFR BLD AUTO: 1.6 % (ref 0–0.5)
LYMPHOCYTES # BLD AUTO: 1.75 10*3/MM3 (ref 0.7–3.1)
LYMPHOCYTES NFR BLD AUTO: 12 % (ref 19.6–45.3)
MCH RBC QN AUTO: 27.6 PG (ref 26.6–33)
MCHC RBC AUTO-ENTMCNC: 30.9 G/DL (ref 31.5–35.7)
MCV RBC AUTO: 89.3 FL (ref 79–97)
MONOCYTES # BLD AUTO: 0.9 10*3/MM3 (ref 0.1–0.9)
MONOCYTES NFR BLD AUTO: 6.1 % (ref 5–12)
NEUTROPHILS NFR BLD AUTO: 11.63 10*3/MM3 (ref 1.7–7)
NEUTROPHILS NFR BLD AUTO: 79.5 % (ref 42.7–76)
NRBC BLD AUTO-RTO: 0 /100 WBC (ref 0–0.2)
PLATELET # BLD AUTO: 188 10*3/MM3 (ref 140–450)
PMV BLD AUTO: 11.4 FL (ref 6–12)
RBC # BLD AUTO: 4.78 10*6/MM3 (ref 3.77–5.28)
WBC NRBC COR # BLD: 14.64 10*3/MM3 (ref 3.4–10.8)

## 2021-11-18 PROCEDURE — 25010000002 ENOXAPARIN PER 10 MG: Performed by: OBSTETRICS & GYNECOLOGY

## 2021-11-18 PROCEDURE — 85025 COMPLETE CBC W/AUTO DIFF WBC: CPT | Performed by: OBSTETRICS & GYNECOLOGY

## 2021-11-18 PROCEDURE — 86902 BLOOD TYPE ANTIGEN DONOR EA: CPT

## 2021-11-18 PROCEDURE — 63710000001 ONDANSETRON PER 8 MG: Performed by: OBSTETRICS & GYNECOLOGY

## 2021-11-18 PROCEDURE — 86870 RBC ANTIBODY IDENTIFICATION: CPT

## 2021-11-18 RX ORDER — LAMOTRIGINE 100 MG/1
100 TABLET ORAL ONCE
Status: COMPLETED | OUTPATIENT
Start: 2021-11-18 | End: 2021-11-18

## 2021-11-18 RX ORDER — CETIRIZINE HYDROCHLORIDE 10 MG/1
10 TABLET ORAL NIGHTLY
Status: DISCONTINUED | OUTPATIENT
Start: 2021-11-18 | End: 2021-11-19 | Stop reason: HOSPADM

## 2021-11-18 RX ORDER — FOLIC ACID 1 MG/1
1 TABLET ORAL DAILY
Status: DISCONTINUED | OUTPATIENT
Start: 2021-11-18 | End: 2021-11-19 | Stop reason: HOSPADM

## 2021-11-18 RX ORDER — PRENATAL VIT/IRON FUM/FOLIC AC 27MG-0.8MG
1 TABLET ORAL DAILY
Status: DISCONTINUED | OUTPATIENT
Start: 2021-11-19 | End: 2021-11-19 | Stop reason: HOSPADM

## 2021-11-18 RX ORDER — DULOXETIN HYDROCHLORIDE 60 MG/1
60 CAPSULE, DELAYED RELEASE ORAL ONCE
Status: COMPLETED | OUTPATIENT
Start: 2021-11-18 | End: 2021-11-18

## 2021-11-18 RX ORDER — CHOLECALCIFEROL (VITAMIN D3) 125 MCG
5 CAPSULE ORAL NIGHTLY
Status: DISCONTINUED | OUTPATIENT
Start: 2021-11-18 | End: 2021-11-19 | Stop reason: HOSPADM

## 2021-11-18 RX ORDER — PANTOPRAZOLE SODIUM 40 MG/1
40 TABLET, DELAYED RELEASE ORAL DAILY
Status: DISCONTINUED | OUTPATIENT
Start: 2021-11-18 | End: 2021-11-19 | Stop reason: HOSPADM

## 2021-11-18 RX ADMIN — PANTOPRAZOLE SODIUM 40 MG: 40 TABLET, DELAYED RELEASE ORAL at 21:35

## 2021-11-18 RX ADMIN — LAMOTRIGINE 100 MG: 100 TABLET ORAL at 21:36

## 2021-11-18 RX ADMIN — HYDROXYCHLOROQUINE SULFATE 200 MG: 200 TABLET ORAL at 21:35

## 2021-11-18 RX ADMIN — ENOXAPARIN SODIUM 40 MG: 40 INJECTION SUBCUTANEOUS at 09:34

## 2021-11-18 RX ADMIN — ONDANSETRON HYDROCHLORIDE 4 MG: 4 TABLET, FILM COATED ORAL at 01:43

## 2021-11-18 RX ADMIN — OXYCODONE HYDROCHLORIDE AND ACETAMINOPHEN 1 TABLET: 5; 325 TABLET ORAL at 01:43

## 2021-11-18 RX ADMIN — HYDROXYCHLOROQUINE SULFATE 200 MG: 200 TABLET ORAL at 09:34

## 2021-11-18 RX ADMIN — DULOXETINE HYDROCHLORIDE 60 MG: 60 CAPSULE, DELAYED RELEASE ORAL at 21:36

## 2021-11-18 RX ADMIN — FOLIC ACID 1 MG: 1 TABLET ORAL at 21:35

## 2021-11-18 RX ADMIN — Medication 5 MG: at 21:35

## 2021-11-18 RX ADMIN — CETIRIZINE HYDROCHLORIDE 10 MG: 10 TABLET, FILM COATED ORAL at 21:35

## 2021-11-18 RX ADMIN — POLYETHYLENE GLYCOL (3350) 17 G: 17 POWDER, FOR SOLUTION ORAL at 09:34

## 2021-11-18 RX ADMIN — OXYCODONE HYDROCHLORIDE AND ACETAMINOPHEN 1 TABLET: 10; 325 TABLET ORAL at 21:13

## 2021-11-18 NOTE — PLAN OF CARE
Goal Outcome Evaluation:           Progress: improving  Outcome Summary: POST  IN RECOVERY. BONDING WITH INFANT. FAMILY MEMBERS AT [facility].

## 2021-11-18 NOTE — NURSING NOTE
"Motherhood Connection Check-In    Patient Name: Livia Benítez   Preferred Name: Livia   Date/Time of Contact: 2021 6961   Services: no No  Demographics Reviewed: yes  Living Status/Arrangements: Home  May we continue to contact you by phone: yes By Mail: yes By email: yes    Providers:     Type of Provider Name Next Appointment   OB/GYN     Primary Care Provider     Dental Provider     PEDS Provider     Speciality Provider       Frequency of OB/GYN Provider Visits: [] Every 2 wks  [] wkly [] twice/wk [] Every 4 weeks [x] other:     Able to keep appts as scheduled: Yes    Questions regarding prenatal visits or tests to be ordered: no    Other Providers/Services Presently Utilizing: WIC (Women, Infant, Children)    Have you seen a dentist in last 6 months: no  OB/GYN Status    Significant Other/Support Person: Name:Btey Benítez                    Relationship: mother    DEMETRICE na        Based Upon na    GA: 36w 2d     Number of Fetuses: 1    Currently Employed: No    Patient's last menstrual period was 2021.  Estimated Date of Delivery: 21     Gender(s) & Name(s): Female  Currently Breastfeeding: yes  Baby active/feeling fetal movement: Yes  How are you presently feeling: \"Tired. I was sick to my stomach most of the night. That is finally doing better.\"    New Diagnosis (Also update in HX) [] Hyperemesis [] HTN (Type: [] Chronic [] Gestational) [] Pre-eclampsia [] GDM  [] PTL [] Anxiety [] Depression [] Anemia [] STI [] Other  What questions can I help to answer such as questions related to your care experience, your pregnancy, plans for delivery, any concerns, etc.: None    Delivery Plans:  Method:  [] Vaginal  []  [] TOLAC   Anesthesia: [] None [] Epidural [] Spinal [] Other :    Special Considerations: [] Birth Plan []  [] Birthing Ball [] Peanut Ball [] Other:      Support Person(s): mother Morley    Post-Delivery Plans:   Pediatric Provider Chosen: Yes, describe: " CPA  Adoption: no Circumcision for Male Baby: no  Feeding Intentions: [] Breast Milk [] Formula [] Breast Milk and Formula  Own a Breast Pump: [] No [] Manual [] Electric (Type/Brand:                   )  Birth Control: [] Undecided [] Tubal Ligation (Discussed w/ Provider and Consent Signed: [] Yes [] No  [] Other Planned Method of Birth Control    Immunizations:  Influenza: [] Not Yet [] Refused [] Yes        Tdap: [] Not Yet [] Refused [] Yes  Covid [] Not Yet [] Refused [] Yes    Review for Changes in Social History  Social History:  Smoking Status: Never a smoker.  Cigarettes  Passive Exposure: no  Counseling Given: yes  Smokeless Tobacco: Never a smoker.  Alcohol Use: No   Drinks/wk: 0   Substance Use History: No  Substances Used & Use/Wk: 0    [facility] Domestic Violence/Abuse Screening    Within the last year, have you been:  Humiliated or emotionally abused in other ways by your partner or ex-partner no  Afraid of your partner or ex-partner no  Raped or forced to have any kind of sexual activity by your partner or ex-partner no  Kicked, hit, slapped, or otherwise physically hurt by your partner or ex-partner no  Feels Unsafe at home or work/school: no  Feels Threatened by Someone no  Does anyone try to keep you from having contact with others/doing things outside your home: no  History of physical, mental, emotional, financial abuse/neglect: no    [place]  Depression Scale    Over the last 7 days:  I have been able to laugh and see the funny side of things: 0= As much as I always could  I have looked forward with enjoyment to things: 0= As much as I ever did  I have blamed myself unnecessarily when things went wron= No, never  I have been anxious or worried for no good reason: 0= No, not at all  I have felt scared or panicky for no good reason: 0= No, never  Things have been getting on top of me: 0= No, I have been coping as well as ever  I have been so unhappy that I have had difficulty sleeping:  0= No, not at all  I have felt sad or miserable: 0= No, not at all  I have been so unhappy that I have been cryin= No, never  The thought of harming myself has occurred to me: 0= Never  Total Score: 0    Spiritual, Cultural, Holiness, Value Awareness  Any Spiritual, Cultural, or Holiness Beliefs/Practices/Values that we need to be mindful of throughout your maternity experience: no  Supplies ready for baby: [x] Car Seat [x] Crib [x] Clothing [x] Diapers [x] Feeding Supplies  Resource/Environmental Concerns: None    Disability/Function  Disabilities (hearing, seeing, concentrating, communicating, comprehension, performing activities of daily living): no  Accomodations/Assistive Devices Utililzed (e.g. hearing aids, sign language, braile, service animal, /aide, etc.): none  Equipment Presently Utilized at Home: [] Breast Pump [] Glucometer [] Blood Pressure Cuff [] Other:     Preparing for Labor and Baby Education    Completed Childbirth Education Classes: [] No/Declined [] Yes [] Requests more information  Lactation Education Classes: [] No/Declined [] Yes [] Requests more information  [] No/Declined [] Actively Participating [] Requests more information [] Other    Topic Educational Information Comments   Fetal Movement [] Provided   []Acknowledged [] Refused    Choosing a Pediatrician [] Provided   []Acknowledged [] Refused    Community Resources [] Provided   []Acknowledged [] Refused    Hospital Education Programs [] Provided   []Acknowledged [] Refused    [facility] Maternal-Child Department [] Provided   []Acknowledged [] Refused    Signs or Symptoms to Report [] Provided   []Acknowledged [] Refused    Other: [] Provided   []Acknowledged [] Refused    Other:  [] Provided   []Acknowledged [] Refused    Comments:  [] Provided   []Acknowledged [] Refused      Do you have the [employer]?  [] YES   [x] NO     MyChart: [x] YES   [] NO     Specific Resources Provided and Method: How to find a  pediatrician, Insurance Benefits/Incentives, WIC Benefits Sent via: Handouts Given    Intake Summary  [x] Periodic Check completed, will follow-up with patient:   [] Discussed community resources and information provided or assisted with appointments (see notes)  [] Other, including any provider notifications (see notes)  [] Declines further [employer] participation (see notes)  Diana Trevino RN   Maternal Nurse Navigator

## 2021-11-18 NOTE — PLAN OF CARE
Goal Outcome Evaluation:               Pt doing well today. Ambulating in room. Medicated prn. Denies needs   Problem: Adult Inpatient Plan of Care  Goal: Plan of Care Review  Outcome: Ongoing, Progressing     Problem: Adult Inpatient Plan of Care  Goal: Patient-Specific Goal (Individualized)  Outcome: Ongoing, Progressing     Problem: Adult Inpatient Plan of Care  Goal: Absence of Hospital-Acquired Illness or Injury  Intervention: Identify and Manage Fall Risk  Description: Perform standard risk assessment with a validated tool or comprehensive approach appropriate to the patient on admission; reassess fall risk frequently, with change in status or transfer to another level of care.  Communicate fall injury risk to interprofessional healthcare team.  Determine need for increased observation, equipment and environmental modification, such as low bed and signage, as well as supportive, nonskid footwear.  Adjust safety measures to individual developmental age, stage and identified risk factors.  Reinforce the importance of safety and physical activity with patient and family.  Perform regular intentional rounding to assess need for position change, pain assessment, personal needs, including assistance with toileting.  Recent Flowsheet Documentation  Taken 11/18/2021 0800 by Yudi Bourne RN  Safety Promotion/Fall Prevention: safety round/check completed     Problem: Adult Inpatient Plan of Care  Goal: Absence of Hospital-Acquired Illness or Injury  Intervention: Prevent Skin Injury  Description: Assess skin risk on admission and at regular intervals throughout hospital stay.  Keep all areas of skin (especially folds) clean and dry.  Maintain adequate skin hydration.  Relieve and redistribute pressure and protect bony prominences; implement measures based on patient-specific risk factors.  Match turning and repositioning schedule to clinical condition.  Encourage weight shift frequently; assist with reposition if  unable to complete independently.  Float heels off bed. Avoid pressure on the Achilles tendon.  Keep skin free from extended contact with medical devices.  Use aids (e.g., slide boards, mechanical lift) during transfer.  Recent Flowsheet Documentation  Taken 11/18/2021 0800 by Yudi Bourne RN  Body Position: supine     Problem: Adult Inpatient Plan of Care  Goal: Absence of Hospital-Acquired Illness or Injury  Intervention: Prevent and Manage VTE (venous thromboembolism) Risk  Description: Assess for VTE risk.  Encourage/assist with early ambulation.  Initiate and maintain compression or other therapy, as indicated based on identified risk in accordance with organizational protocol/provider order.  Encourage both active and passive leg exercises while in bed, if unable to ambulate.  Recent Flowsheet Documentation  Taken 11/18/2021 0800 by Yudi Bourne RN  VTE Prevention/Management: bleeding risk factor(s) identified     Problem: Adult Inpatient Plan of Care  Goal: Absence of Hospital-Acquired Illness or Injury  Intervention: Prevent Infection  Description: Maintain skin and mucous membrane integrity; promote hand, oral and pulmonary hygiene.  Optimize fluid balance, nutrition, sleep and glycemic control to maximize infection resistance.  Identify potential sources of infection early to prevent or mitigate progression of infection (e.g., wound, lines, devices).  Evaluate ongoing need for invasive devices; remove promptly when no longer indicated.  Recent Flowsheet Documentation  Taken 11/18/2021 0800 by Yudi Bourne RN  Infection Prevention:   rest/sleep promoted   visitors restricted/screened   single patient room provided   personal protective equipment utilized   hand hygiene promoted   equipment surfaces disinfected   environmental surveillance performed     Problem: Adult Inpatient Plan of Care  Goal: Optimal Comfort and Wellbeing  Outcome: Ongoing, Progressing  Intervention: Provide Person-Centered  Care  Description: Use a family-focused approach to care.  Develop trust and rapport by proactively providing information, encouraging questions, addressing concerns and offering reassurance.  Acknowledge emotional response to hospitalization.  Recognize and utilize personal coping strategies.  Honor spiritual and cultural preferences.  Recent Flowsheet Documentation  Taken 2021 0800 by Yudi Bourne RN  Trust Relationship/Rapport:   care explained   choices provided   emotional support provided   empathic listening provided   questions answered   questions encouraged   reassurance provided   thoughts/feelings acknowledged     Problem: Adult Inpatient Plan of Care  Goal: Optimal Comfort and Wellbeing  Intervention: Provide Person-Centered Care  Description: Use a family-focused approach to care.  Develop trust and rapport by proactively providing information, encouraging questions, addressing concerns and offering reassurance.  Acknowledge emotional response to hospitalization.  Recognize and utilize personal coping strategies.  Honor spiritual and cultural preferences.  Recent Flowsheet Documentation  Taken 2021 0800 by Yudi Bourne RN  Trust Relationship/Rapport:   care explained   choices provided   emotional support provided   empathic listening provided   questions answered   questions encouraged   reassurance provided   thoughts/feelings acknowledged     Problem: Adult Inpatient Plan of Care  Goal: Readiness for Transition of Care  Outcome: Ongoing, Progressing     Problem:  Fall Injury Risk  Goal: Absence of Fall, Infant Drop and Related Injury  Outcome: Ongoing, Progressing  Intervention: Identify and Manage Contributors to Fall Injury Risk  Description: Reassess fall risk frequently and with change in status or transfer to another level of care.  Communicate fall injury risk to interprofessional healthcare team. (e.g., rounds change of shift/provider, patient  transport).  Anticipate needs; perform regular intentional rounding to assess need for position change, pain assessment, personal needs (e.g., toileting) and placement of necessary items.  Promote use of personal vision and auditory aids (e.g., glasses, hearing aids).  Assess assistance level required for safe and effective self-care; provide support as needed (e.g., toileting, mobilization).  Define behavior and activity limits to patient and family to decrease fall or drop risk.  If fall occurs, assess for and treat injury; determine cause; revise fall injury prevention plan.  Regularly review medication contribution to fall risk; consider polypharmacy and high-risk medications (e.g., antiepileptic, narcotic, regional block, anesthesia, sedation) given within last 24 hours.  Adjust medication administration times to minimize risk of falling.  Balance adequate pain management with potential for oversedation  Recent Flowsheet Documentation  Taken 2021 0800 by Yudi Bourne RN  Medication Review/Management: medications reviewed  Intervention: Promote Injury-Free Environment  Description: Provide a safe, barrier-free environment that encourages independent activity.  Keep care area uncluttered and well-lighted.  Determine need for increased observation or auditory alerts (e.g., bed or chair alarm).  Assess equipment and environmental modification needs (e.g., low bed, signage, nonskid footwear, grab bars).  Avoid use of restraints.  Recent Flowsheet Documentation  Taken 2021 0800 by Yudi Bourne, RN  Safety Promotion/Fall Prevention: safety round/check completed     Problem: Adjustment to Role Transition (Postpartum  Delivery)  Goal: Successful Maternal Role Transition  Outcome: Ongoing, Progressing  Intervention: Support Maternal Role Transition  Description: Develop trust, relationship and rapport.  Use a family-focused approach; promote involvement of support system in infant  care.  Incorporate cultural beliefs, rituals and practices in family-centered care.  Encourage and support breastfeeding, frequent holding and skin-to-skin contact with .  Encourage attachment behaviors; promote involvement in infant care.  Monitor maternal emotional state, as well as maternal-infant interaction.  Encourage and answer questions; share resources for support following discharge.  Recent Flowsheet Documentation  Taken 2021 0800 by Yudi Bourne, RN  Supportive Measures: positive reinforcement provided     Problem: Bleeding (Postpartum  Delivery)  Goal: Hemostasis  Outcome: Ongoing, Progressing     Problem: Infection (Postpartum  Delivery)  Goal: Absence of Infection Signs and Symptoms  Outcome: Ongoing, Progressing  Intervention: Prevent or Manage Infection  Description: Optimize fluid balance, nutrition, sleep, oxygenation, glycemic control and body temperature to maximize resistance.  Maintain dressing and closed drainage system integrity to reduce the risk for infection.  Consider splinting techniques to prevent incision dehiscence.  Discontinue prophylactic antimicrobial agent within 24 hours after surgery, as directed.  Identify potential sources of infection early; evaluate continued need and advocate for early removal (e.g., lines, devices, drains).  Implement transmission-based precautions and isolation, as indicated, to prevent spread of infection.  If infection suspected, administer ordered antimicrobial therapy promptly; reassess need regularly.  Note: If endometritis is suspected, treatment may be initiated without obtaining cultures.  Identify and manage signs of early sepsis.  Provide fever-reduction and comfort measures.  Recent Flowsheet Documentation  Taken 2021 0800 by Yudi Bourne, RN  Infection Management: aseptic technique maintained     Problem: Pain (Postpartum  Delivery)  Goal: Acceptable Pain Control  Outcome: Ongoing,  Progressing  Intervention: Prevent or Manage Pain  Description: Develop mutual pain management plan with patient and caregiver; review regularly.  Monitor for the presence of incisional pain; provide timely pain management interventions when present.  Use cold application, as culturally-appropriate, to the incisional area for the first 24 to 48 hours to enhance comfort.  Encourage early ambulation, when able, to help avoid gas accumulation which can add to discomfort.  Verify correct infant latch when breastfeeding to prevent nipple pain.  Consider the presence and impact of preexisting chronic pain.  Encourage patient and caregiver involvement in pain assessment, interventions and safety measures.  Individualize pharmacologic pain management plan; titrate medication to patient response.  Combine multimodal analgesia and nonpharmacologic strategies to help potentiate synergistic effects and enhance comfort (e.g., complementary therapy, diversional activity).  Offer around-the-clock dosing of pain medication to keep pain levels in control.  Manage medication-induced effects, such as constipation, nausea, vomiting.  Support and optimize psychosocial response to pain.  If engorgement occurs, encourage more frequent breastfeeding or pumping and storing additional milk to ease discomfort.  Note: Cold compresses, as culturally-appropriate, may be used if bottle-feeding.  If post-dural puncture headache identified, encourage adequate hydration and anticipate the need for epidural blood patch.  If hemorrhoids are present and painful, offer topical pain relief and sitz baths for comfort.  Recent Flowsheet Documentation  Taken 2021 0800 by Yudi Bourne RN  Pain Management Interventions: pain management plan reviewed with patient/caregiver     Problem: Postoperative Nausea and Vomiting (Postpartum  Delivery)  Goal: Nausea and Vomiting Relief  Outcome: Ongoing, Progressing     Problem: Postoperative Urinary  Retention (Postpartum  Delivery)  Goal: Effective Urinary Elimination  Outcome: Ongoing, Progressing

## 2021-11-18 NOTE — PROGRESS NOTES
ELAINE Jorge   PROGRESS NOTE    Post-Op Day 1 S/P   Subjective   Subjective  Patient reports:  Pain is controlled with ibuprofen (OTC) and narcotic analgesics including Percocet.  She is up out of bed this am. Tolerating diet. Tolerating po -- normal. Voiding - without difficulty; flatus reported..  Vaginal bleeding is light.    Objective    Objective     Vitals: Vital Signs Range for the last 24 hours  Temperature: Temp:  [97.6 °F (36.4 °C)-98.3 °F (36.8 °C)] 97.9 °F (36.6 °C)   Temp Source: Temp src: Oral   BP: BP: (0-159)/(0-96) 140/85   Pulse: Heart Rate:  [] 113   Respirations: Resp:  [14-22] 18   Weight: Weight:  [133 kg (293 lb 9.6 oz)] 133 kg (293 lb 9.6 oz)            Physical Exam    Lungs clear to auscultation bilaterally   Abdomen Soft, ND, tender along incision. + BS   Incision  well approximated   Extremities extremities normal, atraumatic, no cyanosis or edema         [unfilled]       Lab Results   Component Value Date    ABO A 2021    RH Positive 2021        Lab Results   Component Value Date    HGB 13.2 2021    HCT 42.7 2021       Assessment/Plan        Severe pre-eclampsia    Postpartum care following  delivery    Assessment & Plan    Assessment:    Livia Benítez is Day 1  post-partum  , Low Transverse   .      Plan:  continue post op care.      Eloina Clemens, DANIEL  21  10:02 EST

## 2021-11-19 VITALS
OXYGEN SATURATION: 95 % | HEART RATE: 120 BPM | RESPIRATION RATE: 18 BRPM | DIASTOLIC BLOOD PRESSURE: 84 MMHG | HEIGHT: 68 IN | TEMPERATURE: 98.1 F | SYSTOLIC BLOOD PRESSURE: 136 MMHG | WEIGHT: 293 LBS | BODY MASS INDEX: 44.41 KG/M2

## 2021-11-19 PROCEDURE — 25010000002 ENOXAPARIN PER 10 MG: Performed by: OBSTETRICS & GYNECOLOGY

## 2021-11-19 RX ORDER — OXYCODONE HYDROCHLORIDE AND ACETAMINOPHEN 5; 325 MG/1; MG/1
1 TABLET ORAL EVERY 4 HOURS PRN
Qty: 15 TABLET | Refills: 0 | Status: SHIPPED | OUTPATIENT
Start: 2021-11-19 | End: 2021-11-22

## 2021-11-19 RX ORDER — LAMOTRIGINE 100 MG/1
100 TABLET ORAL 2 TIMES DAILY
Status: DISCONTINUED | OUTPATIENT
Start: 2021-11-19 | End: 2021-11-19 | Stop reason: HOSPADM

## 2021-11-19 RX ORDER — DOCUSATE SODIUM 100 MG/1
100 CAPSULE, LIQUID FILLED ORAL 2 TIMES DAILY
Qty: 60 CAPSULE | Refills: 1 | Status: SHIPPED | OUTPATIENT
Start: 2021-11-19 | End: 2023-01-26

## 2021-11-19 RX ORDER — DULOXETIN HYDROCHLORIDE 60 MG/1
60 CAPSULE, DELAYED RELEASE ORAL DAILY
Status: DISCONTINUED | OUTPATIENT
Start: 2021-11-19 | End: 2021-11-19 | Stop reason: HOSPADM

## 2021-11-19 RX ORDER — SIMETHICONE 80 MG
80 TABLET,CHEWABLE ORAL 4 TIMES DAILY PRN
Qty: 40 TABLET | Refills: 1 | Status: SHIPPED | OUTPATIENT
Start: 2021-11-19 | End: 2023-01-26

## 2021-11-19 RX ADMIN — POLYETHYLENE GLYCOL (3350) 17 G: 17 POWDER, FOR SOLUTION ORAL at 08:17

## 2021-11-19 RX ADMIN — PANTOPRAZOLE SODIUM 40 MG: 40 TABLET, DELAYED RELEASE ORAL at 08:17

## 2021-11-19 RX ADMIN — HYDROXYCHLOROQUINE SULFATE 200 MG: 200 TABLET ORAL at 08:17

## 2021-11-19 RX ADMIN — PRENATAL VIT W/ FE FUMARATE-FA TAB 27-0.8 MG 1 TABLET: 27-0.8 TAB at 10:21

## 2021-11-19 RX ADMIN — OXYCODONE HYDROCHLORIDE AND ACETAMINOPHEN 1 TABLET: 10; 325 TABLET ORAL at 07:27

## 2021-11-19 RX ADMIN — DULOXETINE HYDROCHLORIDE 60 MG: 60 CAPSULE, DELAYED RELEASE ORAL at 10:21

## 2021-11-19 RX ADMIN — ENOXAPARIN SODIUM 40 MG: 40 INJECTION SUBCUTANEOUS at 08:17

## 2021-11-19 RX ADMIN — LAMOTRIGINE 100 MG: 100 TABLET ORAL at 10:21

## 2021-11-19 RX ADMIN — FOLIC ACID 1 MG: 1 TABLET ORAL at 08:17

## 2021-11-19 NOTE — DISCHARGE SUMMARY
ELAINE Jorge  Delivery Discharge Summary    Primary OB Clinician:     EDC: Estimated Date of Delivery: 21    Gestational Age:36w2d    Antepartum complications: AMA, HTN    Date of Delivery: 2021   Time of Delivery: 4:48 PM     Delivered By:  Claudine Taylor     Delivery Type: , Low Transverse      Tubal Ligation: done with c/section    Baby: Female  Apgar:  8  @ 1 minute /   Apgar:  9  @ 5 minutes   Weight: 2990 g (6 lb 9.5 oz)    Anesthesia: Spinal      Intrapartum complications: None    Subjective     Subjective   Post-Op Day 2 S/P   Subjective  Patient reports:  Pain is controlled with ibuprofen (OTC) and narcotic analgesics including Percocet.  She is up out of bed this am. Tolerating diet. Tolerating po -- normal. Voiding - without difficulty; flatus reported..  Vaginal bleeding is as much as expected.    Breastfeeding: declines.    Objective    Objective  Vitals: Vital Signs Range for the last 24 hours  Temperature: Temp:  [97.7 °F (36.5 °C)-97.9 °F (36.6 °C)] 97.7 °F (36.5 °C)   Temp Source: Temp src: Oral   BP: BP: (132-139)/(72-87) 132/72   Pulse: Heart Rate:  [106-110] 106   Respirations: Resp:  [18-20] 20   Weight:              Physical Exam    Lungs clear to auscultation bilaterally   Abdomen Soft, ND, tender along incision. + BS   Incision  well approximated.  Staples   Extremities extremities normal, atraumatic, no cyanosis + edema equal bilaterally no erythema or warmth.       [unfilled]       Lab Results   Component Value Date    ABO A 2021    RH Positive 2021        Lab Results   Component Value Date    HGB 13.2 2021    HCT 42.7 2021       Assesment and Plan:       Severe pre-eclampsia    Postpartum care following  delivery    Assessment & Plan    Assessment:    Livia Benítez is Day 2  post-partum  , Low Transverse   .      Plan:  continue post op care and plan for discharge today.  We discussed home meds and interactions with  breastfeeding and recommendations.  She has chosen formula feed.  Follow-up appointment with [facility] in 3-4 days for staple removal.    Discharge Date: 11/19/2021; Discharge Time: 09:29 EST        DANIEL Julian  11/19/2021  09:28 EST

## 2021-11-21 LAB
BH BB BLOOD EXPIRATION DATE: NORMAL
BH BB BLOOD EXPIRATION DATE: NORMAL
BH BB BLOOD TYPE BARCODE: 6200
BH BB BLOOD TYPE BARCODE: 6200
BH BB DISPENSE STATUS: NORMAL
BH BB DISPENSE STATUS: NORMAL
BH BB PRODUCT CODE: NORMAL
BH BB PRODUCT CODE: NORMAL
BH BB UNIT NUMBER: NORMAL
BH BB UNIT NUMBER: NORMAL
CROSSMATCH INTERPRETATION: NORMAL
CROSSMATCH INTERPRETATION: NORMAL
UNIT  ABO: NORMAL
UNIT  ABO: NORMAL
UNIT  RH: NORMAL
UNIT  RH: NORMAL

## 2021-11-22 ENCOUNTER — TELEPHONE (OUTPATIENT)
Dept: OBSTETRICS AND GYNECOLOGY | Facility: HOSPITAL | Age: 36
End: 2021-11-22

## 2021-11-22 NOTE — TELEPHONE ENCOUNTER
Postpartum Maternal Virtual Visit Form  Livia Benítez  [id number]  [unfilled]       Prenatal, Intrapartum, Postpartum Summary:       OB Provider: Dr. Taylor    Other Providers: CPA    Other Services Used: [facility]       Prenatal Diagnoses: Anxiety    : 1     Para: 1     [place] Gender: female       Medications: Cannot display prior to admission medications because the patient has not been admitted in this contact.         Ibuprofen, PNV       Labor/Delivery Complications: None    Gestation at birth: 36w 2d    Postpartum Complications: none    Delivery Method:  Section    Pain Relief Options During Hospital Stay: IV pain medication and oral pain medication     Smoking Status: Never smoker    Substance Use: Denies substance use/abuse        Feeding Plan: breastfeeding     Primary Language: English     Other: none       Postpartum Depression Scale:       I am able to laugh and see the funny side of things: 0 = As much as I ever did    I can look forward to things with enjoyment: 0 = As much as I ever did    I blame myself unnecessarily when things went wron = Never    I find myself anxious of worried for no good reason: 0 = Never    I feel scared or panicky for no good reason: 0 = Never    Things have been getting on top of me: 0 = Never    I have been so unhappy that I have difficulty sleepin = Never    I feel (or have felt) sad or miserable: 0 = Never    I have been so unhappy that I have been cryin = Never    I have had thoughts of harming myself: 0 = Never    Postpartum Depression Scale Total Score: 0          Pain/Comfort/Sleep:     Pain Rating (Numeric Scale 1 - 10)  Current :  2    At Rest:  1    With Activity:  3    Acceptable Pain Level:  3    Pain Location: lower abdominal incision    Pain Description: intermittent and aching    Pain Management Strategies (How do you treat your pain?): medication, rest         Coping/Psychosocial:       Verbalized Emotional State:  acceptance  and happiness    Family/Support Network: significant other and family    Level of Involvement in Care: attentive and supportive       Safety:       Do you feel comfortable in your relationship with your baby?:  Yes    Have members of your household adjusted to your baby?: Yes    Is the baby's father supportive and/or involved with the baby?: Yes    How does your partner feel about the baby?: happy     Do you feel safe at home, school and work?: Yes    Are you in a relationship with someone who threatens you or hurts you?: No    Do you have the resources to keep yourself and your baby healthy and safe?: Yes       Review of Systems:       History obtained from the patient       Reproductive:       Lochia (per patient report):              Color:  brownish-red              Amount:  scant              Odor:  none    Breast (per patient report):              Left Breast:  full and tender              Right Breast:   full and tender              Left Nipple:  intact and tender              Right Nipple:  intact and tender              Breast Care:  supportive bra       Provider Notification:       Provider Name: HELDER    Reason for Notification: NA    Response: NA       Education Discussion:       Postpartum Depression Screening:  Education provided    Peripheral Blood Glucose:  NA    Doctor Appointments:   Education provided    Car Seat Safety:  Declined    Immunization Schedule:  Pr MD    Breast Feeding:  Education provided    Infant Safety:  Education provided    Family Planning:  Declined    Postpartum Care:  Education provided    S&S to report:  Education provided    Comments:  Pt reports she and her infant is doing well. Pts infant has been to first Pediatrician visit and is scheduled for another visit next week. She states she went for PP visit at OB provider to have staples removed but was unable to get them removed due to equipment not being available. She denies any needs or concerns. Pt states she has all needed  supplies and resources.       Follow-Up Appoinments:       Follow-up Appointments made:  Yes -  Appointment Date: 11/22/2021      Provider/Agency: [facility]    Well Child Visit Appointment made:  Yes -  Appointment Date: 11/29/2021      Provider/Agency: [facility]       Visit Summary:       Visit Summary:  Visit completed: No referral needed       Additional Notes:         Diana Trevino RN,  11/22/21 - 2:05 PM EST

## 2021-11-30 ENCOUNTER — OFFICE VISIT (OUTPATIENT)
Dept: FAMILY MEDICINE CLINIC | Facility: CLINIC | Age: 36
End: 2021-11-30

## 2021-11-30 VITALS
HEIGHT: 68 IN | OXYGEN SATURATION: 93 % | BODY MASS INDEX: 40.47 KG/M2 | DIASTOLIC BLOOD PRESSURE: 81 MMHG | RESPIRATION RATE: 18 BRPM | WEIGHT: 267 LBS | TEMPERATURE: 97.8 F | HEART RATE: 82 BPM | SYSTOLIC BLOOD PRESSURE: 115 MMHG

## 2021-11-30 DIAGNOSIS — R07.9 CHEST PAIN, UNSPECIFIED TYPE: ICD-10-CM

## 2021-11-30 DIAGNOSIS — I10 ESSENTIAL HYPERTENSION: Primary | Chronic | ICD-10-CM

## 2021-11-30 DIAGNOSIS — G43.819 OTHER MIGRAINE WITHOUT STATUS MIGRAINOSUS, INTRACTABLE: Chronic | ICD-10-CM

## 2021-11-30 PROCEDURE — 84484 ASSAY OF TROPONIN QUANT: CPT | Performed by: NURSE PRACTITIONER

## 2021-11-30 PROCEDURE — 99214 OFFICE O/P EST MOD 30 MIN: CPT | Performed by: NURSE PRACTITIONER

## 2021-11-30 RX ORDER — NORTRIPTYLINE HYDROCHLORIDE 10 MG/1
20 CAPSULE ORAL NIGHTLY
Qty: 60 CAPSULE | Refills: 0 | Status: SHIPPED | OUTPATIENT
Start: 2021-11-30 | End: 2023-01-26

## 2021-11-30 RX ORDER — LOSARTAN POTASSIUM 50 MG/1
50 TABLET ORAL DAILY
Qty: 30 TABLET | Refills: 1 | Status: SHIPPED | OUTPATIENT
Start: 2021-11-30 | End: 2022-01-12 | Stop reason: SDUPTHER

## 2021-11-30 RX ORDER — SUMATRIPTAN 100 MG/1
TABLET, FILM COATED ORAL
Qty: 6 TABLET | Refills: 0 | Status: SHIPPED | OUTPATIENT
Start: 2021-11-30

## 2021-12-01 LAB — TROPONIN T SERPL-MCNC: <0.01 NG/ML (ref 0–0.03)

## 2022-01-12 ENCOUNTER — OFFICE VISIT (OUTPATIENT)
Dept: FAMILY MEDICINE CLINIC | Facility: CLINIC | Age: 37
End: 2022-01-12

## 2022-01-12 VITALS
BODY MASS INDEX: 40.16 KG/M2 | DIASTOLIC BLOOD PRESSURE: 69 MMHG | HEART RATE: 84 BPM | SYSTOLIC BLOOD PRESSURE: 108 MMHG | HEIGHT: 68 IN | RESPIRATION RATE: 18 BRPM | WEIGHT: 265 LBS | OXYGEN SATURATION: 94 % | TEMPERATURE: 97.3 F

## 2022-01-12 DIAGNOSIS — I10 ESSENTIAL HYPERTENSION: Chronic | ICD-10-CM

## 2022-01-12 PROCEDURE — 99213 OFFICE O/P EST LOW 20 MIN: CPT | Performed by: NURSE PRACTITIONER

## 2022-01-12 RX ORDER — LOSARTAN POTASSIUM 50 MG/1
50 TABLET ORAL DAILY
Qty: 30 TABLET | Refills: 5 | Status: SHIPPED | OUTPATIENT
Start: 2022-01-12 | End: 2022-07-26 | Stop reason: SDUPTHER

## 2022-01-12 NOTE — PROGRESS NOTES
"Milind Benítez is a 36 y.o. female.     Chief Complaint   Patient presents with   • Hypertension       She presents for follow up of essential hypertension. She states her blood pressure has improved. She lost the losartan but her mom had some and she took it. She lost the imitrex and the nortriptyline as well. She saw neurology who ordered imitrex and tizanidine for her migraines. She states she has not had anymore episodes of chest pain or tingling in her arm.        The following portions of the patient's history were reviewed and updated as appropriate: allergies, current medications, past family history, past medical history, past social history, past surgical history and problem list.    Review of Systems   Constitutional: Negative for fatigue, fever and unexpected weight change.   HENT: Negative for ear pain, rhinorrhea and sore throat.    Eyes: Negative for visual disturbance.   Respiratory: Negative for cough, chest tightness and shortness of breath.    Cardiovascular: Negative for chest pain, palpitations and leg swelling.   Gastrointestinal: Negative for abdominal pain, blood in stool, constipation, diarrhea, nausea and vomiting.   Endocrine: Negative for cold intolerance and heat intolerance.   Genitourinary: Negative for dysuria.   Musculoskeletal: Negative for arthralgias and myalgias.   Skin: Negative for color change.   Allergic/Immunologic: Negative for environmental allergies.   Hematological: Negative for adenopathy.   Psychiatric/Behavioral: Negative for suicidal ideas. The patient is not nervous/anxious.        Objective     /69 (BP Location: Right arm, Patient Position: Sitting, Cuff Size: Adult)   Pulse 84   Temp 97.3 °F (36.3 °C) (Temporal)   Resp 18   Ht 172.7 cm (67.99\")   Wt 120 kg (265 lb)   LMP 03/08/2021   SpO2 94%   BMI 40.30 kg/m²     Physical Exam  Vitals reviewed.   Constitutional:       General: She is not in acute distress.     Appearance: Normal " appearance. She is well-developed. She is not diaphoretic.   HENT:      Head: Normocephalic and atraumatic.   Cardiovascular:      Rate and Rhythm: Normal rate and regular rhythm.      Heart sounds: Normal heart sounds, S1 normal and S2 normal. No murmur heard.  No friction rub. No gallop.    Pulmonary:      Effort: Pulmonary effort is normal. No respiratory distress.      Breath sounds: Normal breath sounds.   Abdominal:      General: Bowel sounds are normal. There is no distension.      Palpations: Abdomen is soft.      Tenderness: There is no abdominal tenderness.   Skin:     General: Skin is warm and dry.   Neurological:      Mental Status: She is alert and oriented to person, place, and time.   Psychiatric:         Behavior: Behavior normal.         Thought Content: Thought content normal.         Judgment: Judgment normal.         Assessment/Plan     Problem List Items Addressed This Visit     None      Visit Diagnoses     Essential hypertension  (Chronic)       Relevant Medications    losartan (Cozaar) 50 MG tablet          Plan: Continue losartan to maintain blood pressure control. Follow up in 6 months and as needed.    @Body mass index is 40.3 kg/m².           Understands disease processes and need for medications.  Understands reasons for urgent and emergent care.  Patient (& family) verbalized agreement for treatment plan.   Emotional support and active listening provided.  Patient provided time to verbalize feelings.               This document has been electronically signed by DANIEL Restrepo   January 12, 2022 14:39 EST

## 2022-03-17 ENCOUNTER — TRANSCRIBE ORDERS (OUTPATIENT)
Dept: ADMINISTRATIVE | Facility: HOSPITAL | Age: 37
End: 2022-03-17

## 2022-03-17 ENCOUNTER — LAB (OUTPATIENT)
Dept: LAB | Facility: HOSPITAL | Age: 37
End: 2022-03-17

## 2022-03-17 DIAGNOSIS — M32.9 SYSTEMIC LUPUS ERYTHEMATOSUS, UNSPECIFIED SLE TYPE, UNSPECIFIED ORGAN INVOLVEMENT STATUS: ICD-10-CM

## 2022-03-17 DIAGNOSIS — E55.9 VITAMIN D DEFICIENCY: ICD-10-CM

## 2022-03-17 DIAGNOSIS — Z79.899 ENCOUNTER FOR LONG-TERM (CURRENT) USE OF OTHER MEDICATIONS: ICD-10-CM

## 2022-03-17 DIAGNOSIS — E55.9 AVITAMINOSIS D: Primary | ICD-10-CM

## 2022-03-17 DIAGNOSIS — E55.9 AVITAMINOSIS D: ICD-10-CM

## 2022-03-17 PROCEDURE — 86160 COMPLEMENT ANTIGEN: CPT

## 2022-03-17 PROCEDURE — 36415 COLL VENOUS BLD VENIPUNCTURE: CPT

## 2022-03-17 PROCEDURE — 81001 URINALYSIS AUTO W/SCOPE: CPT

## 2022-03-17 PROCEDURE — 86255 FLUORESCENT ANTIBODY SCREEN: CPT

## 2022-03-17 PROCEDURE — 86140 C-REACTIVE PROTEIN: CPT

## 2022-03-17 PROCEDURE — 85652 RBC SED RATE AUTOMATED: CPT

## 2022-03-17 PROCEDURE — 85025 COMPLETE CBC W/AUTO DIFF WBC: CPT

## 2022-03-17 PROCEDURE — 80053 COMPREHEN METABOLIC PANEL: CPT

## 2022-03-17 PROCEDURE — 87086 URINE CULTURE/COLONY COUNT: CPT

## 2022-03-18 LAB
ALBUMIN SERPL-MCNC: 4.6 G/DL (ref 3.5–5.2)
ALBUMIN/GLOB SERPL: 1.6 G/DL
ALP SERPL-CCNC: 74 U/L (ref 39–117)
ALT SERPL W P-5'-P-CCNC: 13 U/L (ref 1–33)
ANION GAP SERPL CALCULATED.3IONS-SCNC: 13.4 MMOL/L (ref 5–15)
AST SERPL-CCNC: 14 U/L (ref 1–32)
BACTERIA SPEC AEROBE CULT: NORMAL
BACTERIA UR QL AUTO: ABNORMAL /HPF
BASOPHILS # BLD AUTO: 0.02 10*3/MM3 (ref 0–0.2)
BASOPHILS NFR BLD AUTO: 0.3 % (ref 0–1.5)
BILIRUB SERPL-MCNC: 0.5 MG/DL (ref 0–1.2)
BILIRUB UR QL STRIP: NEGATIVE
BUN SERPL-MCNC: 14 MG/DL (ref 6–20)
BUN/CREAT SERPL: 20.9 (ref 7–25)
C3 SERPL-MCNC: 156 MG/DL (ref 82–167)
C4 SERPL-MCNC: 28 MG/DL (ref 14–44)
CALCIUM SPEC-SCNC: 9.7 MG/DL (ref 8.6–10.5)
CHLORIDE SERPL-SCNC: 105 MMOL/L (ref 98–107)
CLARITY UR: CLEAR
CO2 SERPL-SCNC: 22.6 MMOL/L (ref 22–29)
COD CRY URNS QL: ABNORMAL /HPF
COLOR UR: ABNORMAL
CREAT SERPL-MCNC: 0.67 MG/DL (ref 0.57–1)
CRP SERPL-MCNC: <0.3 MG/DL (ref 0–0.5)
DEPRECATED RDW RBC AUTO: 43.2 FL (ref 37–54)
EGFRCR SERPLBLD CKD-EPI 2021: 115.6 ML/MIN/1.73
EOSINOPHIL # BLD AUTO: 0.12 10*3/MM3 (ref 0–0.4)
EOSINOPHIL NFR BLD AUTO: 1.9 % (ref 0.3–6.2)
ERYTHROCYTE [DISTWIDTH] IN BLOOD BY AUTOMATED COUNT: 14 % (ref 12.3–15.4)
ERYTHROCYTE [SEDIMENTATION RATE] IN BLOOD: 9 MM/HR (ref 0–20)
GLOBULIN UR ELPH-MCNC: 2.8 GM/DL
GLUCOSE SERPL-MCNC: 103 MG/DL (ref 65–99)
GLUCOSE UR STRIP-MCNC: NEGATIVE MG/DL
HCT VFR BLD AUTO: 42.2 % (ref 34–46.6)
HGB BLD-MCNC: 13.6 G/DL (ref 12–15.9)
HGB UR QL STRIP.AUTO: NEGATIVE
HYALINE CASTS UR QL AUTO: ABNORMAL /LPF
IMM GRANULOCYTES # BLD AUTO: 0.02 10*3/MM3 (ref 0–0.05)
IMM GRANULOCYTES NFR BLD AUTO: 0.3 % (ref 0–0.5)
KETONES UR QL STRIP: NEGATIVE
LEUKOCYTE ESTERASE UR QL STRIP.AUTO: NEGATIVE
LYMPHOCYTES # BLD AUTO: 2.14 10*3/MM3 (ref 0.7–3.1)
LYMPHOCYTES NFR BLD AUTO: 34.4 % (ref 19.6–45.3)
MCH RBC QN AUTO: 27.6 PG (ref 26.6–33)
MCHC RBC AUTO-ENTMCNC: 32.2 G/DL (ref 31.5–35.7)
MCV RBC AUTO: 85.8 FL (ref 79–97)
MONOCYTES # BLD AUTO: 0.47 10*3/MM3 (ref 0.1–0.9)
MONOCYTES NFR BLD AUTO: 7.6 % (ref 5–12)
NEUTROPHILS NFR BLD AUTO: 3.45 10*3/MM3 (ref 1.7–7)
NEUTROPHILS NFR BLD AUTO: 55.5 % (ref 42.7–76)
NITRITE UR QL STRIP: NEGATIVE
NRBC BLD AUTO-RTO: 0 /100 WBC (ref 0–0.2)
PH UR STRIP.AUTO: 5.5 [PH] (ref 5–8)
PLATELET # BLD AUTO: 218 10*3/MM3 (ref 140–450)
PMV BLD AUTO: 11.2 FL (ref 6–12)
POTASSIUM SERPL-SCNC: 4 MMOL/L (ref 3.5–5.2)
PROT SERPL-MCNC: 7.4 G/DL (ref 6–8.5)
PROT UR QL STRIP: ABNORMAL
RBC # BLD AUTO: 4.92 10*6/MM3 (ref 3.77–5.28)
RBC # UR STRIP: ABNORMAL /HPF
REF LAB TEST METHOD: ABNORMAL
SODIUM SERPL-SCNC: 141 MMOL/L (ref 136–145)
SP GR UR STRIP: >=1.03 (ref 1–1.03)
SQUAMOUS #/AREA URNS HPF: ABNORMAL /HPF
UROBILINOGEN UR QL STRIP: ABNORMAL
WBC # UR STRIP: ABNORMAL /HPF
WBC NRBC COR # BLD: 6.22 10*3/MM3 (ref 3.4–10.8)

## 2022-03-19 LAB
DSDNA AB SER QL CLIF: POSITIVE
DSDNA AB TITR SER CLIF: ABNORMAL {TITER}

## 2022-07-26 ENCOUNTER — OFFICE VISIT (OUTPATIENT)
Dept: FAMILY MEDICINE CLINIC | Facility: CLINIC | Age: 37
End: 2022-07-26

## 2022-07-26 VITALS
TEMPERATURE: 97.3 F | WEIGHT: 260 LBS | RESPIRATION RATE: 18 BRPM | HEART RATE: 103 BPM | BODY MASS INDEX: 39.4 KG/M2 | SYSTOLIC BLOOD PRESSURE: 129 MMHG | OXYGEN SATURATION: 96 % | HEIGHT: 68 IN | DIASTOLIC BLOOD PRESSURE: 82 MMHG

## 2022-07-26 DIAGNOSIS — I10 ESSENTIAL HYPERTENSION: Primary | ICD-10-CM

## 2022-07-26 DIAGNOSIS — Z13.220 SCREENING CHOLESTEROL LEVEL: ICD-10-CM

## 2022-07-26 DIAGNOSIS — Z13.29 SCREENING FOR THYROID DISORDER: ICD-10-CM

## 2022-07-26 PROCEDURE — 80061 LIPID PANEL: CPT | Performed by: NURSE PRACTITIONER

## 2022-07-26 PROCEDURE — 85025 COMPLETE CBC W/AUTO DIFF WBC: CPT | Performed by: NURSE PRACTITIONER

## 2022-07-26 PROCEDURE — 80053 COMPREHEN METABOLIC PANEL: CPT | Performed by: NURSE PRACTITIONER

## 2022-07-26 PROCEDURE — 84443 ASSAY THYROID STIM HORMONE: CPT | Performed by: NURSE PRACTITIONER

## 2022-07-26 PROCEDURE — 99213 OFFICE O/P EST LOW 20 MIN: CPT | Performed by: NURSE PRACTITIONER

## 2022-07-26 RX ORDER — LOSARTAN POTASSIUM 50 MG/1
50 TABLET ORAL DAILY
Qty: 30 TABLET | Refills: 5 | Status: SHIPPED | OUTPATIENT
Start: 2022-07-26 | End: 2023-01-26 | Stop reason: SDUPTHER

## 2022-07-26 NOTE — PROGRESS NOTES
"Milind Benítez is a 37 y.o. female.     Chief Complaint   Patient presents with   • Hypertension       She presents for follow up of essential hypertension. She reports good blood pressure control. She is tolerating the losartan well. She states her baby girl just started crawling.        The following portions of the patient's history were reviewed and updated as appropriate: allergies, current medications, past family history, past medical history, past social history, past surgical history and problem list.    Review of Systems   Constitutional: Negative for fatigue, fever and unexpected weight change.   HENT: Negative for ear pain, rhinorrhea and sore throat.    Eyes: Negative for visual disturbance.   Respiratory: Negative for cough, chest tightness and shortness of breath.    Cardiovascular: Negative for chest pain, palpitations and leg swelling.   Gastrointestinal: Positive for nausea (chronic). Negative for abdominal pain, blood in stool, constipation, diarrhea and vomiting.   Endocrine: Negative for cold intolerance and heat intolerance.   Genitourinary: Negative for dysuria.   Musculoskeletal: Negative for arthralgias and myalgias.   Skin: Negative for color change.   Allergic/Immunologic: Negative for environmental allergies.   Hematological: Negative for adenopathy.   Psychiatric/Behavioral: Negative for suicidal ideas. The patient is not nervous/anxious.        Objective     /82 (BP Location: Left arm, Patient Position: Sitting, Cuff Size: Adult)   Pulse 103   Temp 97.3 °F (36.3 °C) (Temporal)   Resp 18   Ht 172.7 cm (67.99\")   Wt 118 kg (260 lb)   SpO2 96%   Breastfeeding No   BMI 39.54 kg/m²     Physical Exam  Constitutional:       General: She is not in acute distress.     Appearance: Normal appearance. She is well-developed. She is not diaphoretic.   HENT:      Head: Normocephalic and atraumatic.   Cardiovascular:      Rate and Rhythm: Normal rate and regular rhythm.      " Heart sounds: Normal heart sounds, S1 normal and S2 normal. No murmur heard.    No friction rub. No gallop.   Pulmonary:      Effort: Pulmonary effort is normal. No respiratory distress.      Breath sounds: Normal breath sounds.   Abdominal:      General: Bowel sounds are normal. There is no distension.      Palpations: Abdomen is soft.      Tenderness: There is no abdominal tenderness.   Skin:     General: Skin is warm and dry.   Neurological:      Mental Status: She is alert and oriented to person, place, and time.   Psychiatric:         Behavior: Behavior normal.         Thought Content: Thought content normal.         Judgment: Judgment normal.         Current Outpatient Medications   Medication Sig Dispense Refill   • docusate sodium (Colace) 100 MG capsule Take 1 capsule by mouth 2 (Two) Times a Day. 60 capsule 1   • DULoxetine (CYMBALTA) 60 MG capsule Take 1 capsule by mouth Daily. 30 capsule 0   • fexofenadine (Allergy Relief) 180 MG tablet Take 1 tablet by mouth Daily. 30 tablet 5   • hydroxychloroquine (PLAQUENIL) 200 MG tablet Take 200 mg by mouth 2 (Two) Times a Day.     • lamoTRIgine (LaMICtal) 100 MG tablet Take 100 mg by mouth 2 (Two) Times a Day.     • losartan (Cozaar) 50 MG tablet Take 1 tablet by mouth Daily. 30 tablet 5   • melatonin 5 MG tablet tablet Take 5 mg by mouth Every Night.     • nortriptyline (Pamelor) 10 MG capsule Take 2 capsules by mouth Every Night. 60 capsule 0   • pantoprazole (PROTONIX) 40 MG EC tablet Take 40 mg by mouth Daily.     • Prenatal Vit-Fe Fumarate-FA (Prenatal 27-1) 27-1 MG tablet tablet Take 1 tablet by mouth Daily.     • simethicone (MYLICON) 80 MG chewable tablet Chew and swallow 1 tablet by mouth 4 (Four) Times a Day As Needed for Flatulence. 40 tablet 1   • SUMAtriptan (IMITREX) 100 MG tablet Take one tablet at onset of headache. May repeat dose one time in 2 hours if headache not relieved. 6 tablet 0     No current facility-administered medications for this  visit.            Assessment & Plan     Problem List Items Addressed This Visit    None     Visit Diagnoses     Essential hypertension  (Chronic)   -  Primary    Relevant Medications    losartan (Cozaar) 50 MG tablet    Other Relevant Orders    CBC & Differential    Comprehensive Metabolic Panel    Screening cholesterol level        Relevant Orders    Lipid Panel    Screening for thyroid disorder        Relevant Orders    TSH            ICD-10-CM ICD-9-CM   1. Essential hypertension  I10 401.9   2. Screening cholesterol level  Z13.220 V77.91   3. Screening for thyroid disorder  Z13.29 V77.0       Plan: Continue current meds. Get labs today. Follow up in 6 months and as needed.     @Body mass index is 39.54 kg/m².         Understands disease processes and need for medications.  Understands reasons for urgent and emergent care.  Patient (& family) verbalized agreement for treatment plan.   Emotional support and active listening provided.  Patient provided time to verbalize feelings.           Class 2 Severe Obesity (BMI >=35 and <=39.9). Obesity-related health conditions include the following: hypertension. Obesity is unchanged. BMI is is above average; BMI management plan is completed. We discussed info included in summary.      This document has been electronically signed by DANIEL Restrepo   July 26, 2022 12:00 EDT

## 2022-07-26 NOTE — PROGRESS NOTES
Venipuncture Blood Specimen Collection  Venipuncture performed in right arm by Ivana Kumar MA with good hemostasis. Patient tolerated the procedure well without complications.   07/26/22   Ivana Kumar MA

## 2022-07-27 LAB
ALBUMIN SERPL-MCNC: 4.5 G/DL (ref 3.5–5.2)
ALBUMIN/GLOB SERPL: 2.1 G/DL
ALP SERPL-CCNC: 63 U/L (ref 39–117)
ALT SERPL W P-5'-P-CCNC: 13 U/L (ref 1–33)
ANION GAP SERPL CALCULATED.3IONS-SCNC: 11 MMOL/L (ref 5–15)
AST SERPL-CCNC: 14 U/L (ref 1–32)
BASOPHILS # BLD AUTO: 0.02 10*3/MM3 (ref 0–0.2)
BASOPHILS NFR BLD AUTO: 0.3 % (ref 0–1.5)
BILIRUB SERPL-MCNC: 0.4 MG/DL (ref 0–1.2)
BUN SERPL-MCNC: 13 MG/DL (ref 6–20)
BUN/CREAT SERPL: 18.6 (ref 7–25)
CALCIUM SPEC-SCNC: 9.5 MG/DL (ref 8.6–10.5)
CHLORIDE SERPL-SCNC: 104 MMOL/L (ref 98–107)
CHOLEST SERPL-MCNC: 157 MG/DL (ref 0–200)
CO2 SERPL-SCNC: 23 MMOL/L (ref 22–29)
CREAT SERPL-MCNC: 0.7 MG/DL (ref 0.57–1)
DEPRECATED RDW RBC AUTO: 41.9 FL (ref 37–54)
EGFRCR SERPLBLD CKD-EPI 2021: 113.7 ML/MIN/1.73
EOSINOPHIL # BLD AUTO: 0.14 10*3/MM3 (ref 0–0.4)
EOSINOPHIL NFR BLD AUTO: 2.3 % (ref 0.3–6.2)
ERYTHROCYTE [DISTWIDTH] IN BLOOD BY AUTOMATED COUNT: 14 % (ref 12.3–15.4)
GLOBULIN UR ELPH-MCNC: 2.1 GM/DL
GLUCOSE SERPL-MCNC: 95 MG/DL (ref 65–99)
HCT VFR BLD AUTO: 40 % (ref 34–46.6)
HDLC SERPL-MCNC: 46 MG/DL (ref 40–60)
HGB BLD-MCNC: 13.3 G/DL (ref 12–15.9)
IMM GRANULOCYTES # BLD AUTO: 0.01 10*3/MM3 (ref 0–0.05)
IMM GRANULOCYTES NFR BLD AUTO: 0.2 % (ref 0–0.5)
LDLC SERPL CALC-MCNC: 79 MG/DL (ref 0–100)
LDLC/HDLC SERPL: 1.58 {RATIO}
LYMPHOCYTES # BLD AUTO: 1.68 10*3/MM3 (ref 0.7–3.1)
LYMPHOCYTES NFR BLD AUTO: 27.4 % (ref 19.6–45.3)
MCH RBC QN AUTO: 27.7 PG (ref 26.6–33)
MCHC RBC AUTO-ENTMCNC: 33.3 G/DL (ref 31.5–35.7)
MCV RBC AUTO: 83.3 FL (ref 79–97)
MONOCYTES # BLD AUTO: 0.46 10*3/MM3 (ref 0.1–0.9)
MONOCYTES NFR BLD AUTO: 7.5 % (ref 5–12)
NEUTROPHILS NFR BLD AUTO: 3.83 10*3/MM3 (ref 1.7–7)
NEUTROPHILS NFR BLD AUTO: 62.3 % (ref 42.7–76)
NRBC BLD AUTO-RTO: 0 /100 WBC (ref 0–0.2)
PLATELET # BLD AUTO: 186 10*3/MM3 (ref 140–450)
PMV BLD AUTO: 11.7 FL (ref 6–12)
POTASSIUM SERPL-SCNC: 4.1 MMOL/L (ref 3.5–5.2)
PROT SERPL-MCNC: 6.6 G/DL (ref 6–8.5)
RBC # BLD AUTO: 4.8 10*6/MM3 (ref 3.77–5.28)
SODIUM SERPL-SCNC: 138 MMOL/L (ref 136–145)
TRIGL SERPL-MCNC: 191 MG/DL (ref 0–150)
TSH SERPL DL<=0.05 MIU/L-ACNC: 1 UIU/ML (ref 0.27–4.2)
VLDLC SERPL-MCNC: 32 MG/DL (ref 5–40)
WBC NRBC COR # BLD: 6.14 10*3/MM3 (ref 3.4–10.8)

## 2022-07-29 ENCOUNTER — TRANSCRIBE ORDERS (OUTPATIENT)
Dept: ADMINISTRATIVE | Facility: HOSPITAL | Age: 37
End: 2022-07-29

## 2022-07-29 ENCOUNTER — LAB (OUTPATIENT)
Dept: LAB | Facility: HOSPITAL | Age: 37
End: 2022-07-29

## 2022-07-29 DIAGNOSIS — Z79.899 OTHER LONG TERM (CURRENT) DRUG THERAPY: ICD-10-CM

## 2022-07-29 DIAGNOSIS — M32.9 SYSTEMIC LUPUS ERYTHEMATOSUS, UNSPECIFIED SLE TYPE, UNSPECIFIED ORGAN INVOLVEMENT STATUS: ICD-10-CM

## 2022-07-29 DIAGNOSIS — E55.9 VITAMIN D DEFICIENCY: ICD-10-CM

## 2022-07-29 DIAGNOSIS — E55.9 VITAMIN D DEFICIENCY: Primary | ICD-10-CM

## 2022-07-29 PROCEDURE — 85652 RBC SED RATE AUTOMATED: CPT

## 2022-07-29 PROCEDURE — 86160 COMPLEMENT ANTIGEN: CPT

## 2022-07-29 PROCEDURE — 81001 URINALYSIS AUTO W/SCOPE: CPT

## 2022-07-29 PROCEDURE — 85025 COMPLETE CBC W/AUTO DIFF WBC: CPT

## 2022-07-29 PROCEDURE — 86140 C-REACTIVE PROTEIN: CPT

## 2022-07-29 PROCEDURE — 86225 DNA ANTIBODY NATIVE: CPT

## 2022-07-29 PROCEDURE — 36415 COLL VENOUS BLD VENIPUNCTURE: CPT

## 2022-07-29 PROCEDURE — 87086 URINE CULTURE/COLONY COUNT: CPT

## 2022-07-30 LAB
BACTERIA SPEC AEROBE CULT: NORMAL
BACTERIA UR QL AUTO: ABNORMAL /HPF
BASOPHILS # BLD AUTO: 0.02 10*3/MM3 (ref 0–0.2)
BASOPHILS NFR BLD AUTO: 0.3 % (ref 0–1.5)
BILIRUB UR QL STRIP: NEGATIVE
C3 SERPL-MCNC: 151 MG/DL (ref 82–167)
C4 SERPL-MCNC: 28 MG/DL (ref 14–44)
CLARITY UR: CLEAR
COLOR UR: YELLOW
CRP SERPL-MCNC: <0.3 MG/DL (ref 0–0.5)
DEPRECATED RDW RBC AUTO: 42.8 FL (ref 37–54)
EOSINOPHIL # BLD AUTO: 0.14 10*3/MM3 (ref 0–0.4)
EOSINOPHIL NFR BLD AUTO: 2 % (ref 0.3–6.2)
ERYTHROCYTE [DISTWIDTH] IN BLOOD BY AUTOMATED COUNT: 14.1 % (ref 12.3–15.4)
ERYTHROCYTE [SEDIMENTATION RATE] IN BLOOD: 13 MM/HR (ref 0–20)
GLUCOSE UR STRIP-MCNC: NEGATIVE MG/DL
HCT VFR BLD AUTO: 41.6 % (ref 34–46.6)
HGB BLD-MCNC: 13.5 G/DL (ref 12–15.9)
HGB UR QL STRIP.AUTO: NEGATIVE
HYALINE CASTS UR QL AUTO: ABNORMAL /LPF
IMM GRANULOCYTES # BLD AUTO: 0.02 10*3/MM3 (ref 0–0.05)
IMM GRANULOCYTES NFR BLD AUTO: 0.3 % (ref 0–0.5)
KETONES UR QL STRIP: NEGATIVE
LEUKOCYTE ESTERASE UR QL STRIP.AUTO: NEGATIVE
LYMPHOCYTES # BLD AUTO: 2.39 10*3/MM3 (ref 0.7–3.1)
LYMPHOCYTES NFR BLD AUTO: 33.8 % (ref 19.6–45.3)
MCH RBC QN AUTO: 27.6 PG (ref 26.6–33)
MCHC RBC AUTO-ENTMCNC: 32.5 G/DL (ref 31.5–35.7)
MCV RBC AUTO: 84.9 FL (ref 79–97)
MONOCYTES # BLD AUTO: 0.65 10*3/MM3 (ref 0.1–0.9)
MONOCYTES NFR BLD AUTO: 9.2 % (ref 5–12)
NEUTROPHILS NFR BLD AUTO: 3.85 10*3/MM3 (ref 1.7–7)
NEUTROPHILS NFR BLD AUTO: 54.4 % (ref 42.7–76)
NITRITE UR QL STRIP: NEGATIVE
NRBC BLD AUTO-RTO: 0 /100 WBC (ref 0–0.2)
PH UR STRIP.AUTO: 6 [PH] (ref 5–8)
PLATELET # BLD AUTO: 206 10*3/MM3 (ref 140–450)
PMV BLD AUTO: 11.5 FL (ref 6–12)
PROT UR QL STRIP: ABNORMAL
RBC # BLD AUTO: 4.9 10*6/MM3 (ref 3.77–5.28)
RBC # UR STRIP: ABNORMAL /HPF
REF LAB TEST METHOD: ABNORMAL
SP GR UR STRIP: 1.02 (ref 1–1.03)
SQUAMOUS #/AREA URNS HPF: ABNORMAL /HPF
UROBILINOGEN UR QL STRIP: ABNORMAL
WBC # UR STRIP: ABNORMAL /HPF
WBC NRBC COR # BLD: 7.07 10*3/MM3 (ref 3.4–10.8)

## 2022-08-01 LAB — DSDNA AB SER-ACNC: 16 IU/ML (ref 0–9)

## 2022-09-26 ENCOUNTER — HOSPITAL ENCOUNTER (OUTPATIENT)
Dept: GENERAL RADIOLOGY | Facility: HOSPITAL | Age: 37
Discharge: HOME | End: 2022-09-26
Admitting: NURSE PRACTITIONER

## 2022-09-26 ENCOUNTER — OFFICE VISIT (OUTPATIENT)
Dept: UROLOGY | Facility: CLINIC | Age: 37
End: 2022-09-26

## 2022-09-26 VITALS — HEIGHT: 68 IN | BODY MASS INDEX: 39.4 KG/M2 | WEIGHT: 260 LBS

## 2022-09-26 DIAGNOSIS — N20.0 LEFT NEPHROLITHIASIS: ICD-10-CM

## 2022-09-26 DIAGNOSIS — Z87.442 FLANK PAIN WITH HISTORY OF UROLITHIASIS: ICD-10-CM

## 2022-09-26 DIAGNOSIS — R10.9 FLANK PAIN WITH HISTORY OF UROLITHIASIS: ICD-10-CM

## 2022-09-26 DIAGNOSIS — R35.0 FREQUENCY OF URINATION: ICD-10-CM

## 2022-09-26 DIAGNOSIS — R10.84 GENERALIZED ABDOMINAL PAIN: ICD-10-CM

## 2022-09-26 DIAGNOSIS — K59.04 CHRONIC IDIOPATHIC CONSTIPATION: ICD-10-CM

## 2022-09-26 DIAGNOSIS — Z87.442 FLANK PAIN WITH HISTORY OF UROLITHIASIS: Primary | ICD-10-CM

## 2022-09-26 DIAGNOSIS — R10.9 FLANK PAIN WITH HISTORY OF UROLITHIASIS: Primary | ICD-10-CM

## 2022-09-26 LAB
BILIRUB BLD-MCNC: NEGATIVE MG/DL
CLARITY, POC: CLEAR
COLOR UR: YELLOW
EXPIRATION DATE: NORMAL
GLUCOSE UR STRIP-MCNC: NEGATIVE MG/DL
KETONES UR QL: NEGATIVE
LEUKOCYTE EST, POC: NEGATIVE
Lab: NORMAL
NITRITE UR-MCNC: NEGATIVE MG/ML
PH UR: 6 [PH] (ref 5–8)
PROT UR STRIP-MCNC: NEGATIVE MG/DL
RBC # UR STRIP: NEGATIVE /UL
SP GR UR: 1.02 (ref 1–1.03)
UROBILINOGEN UR QL: NORMAL

## 2022-09-26 PROCEDURE — 99214 OFFICE O/P EST MOD 30 MIN: CPT | Performed by: NURSE PRACTITIONER

## 2022-09-26 PROCEDURE — 74018 RADEX ABDOMEN 1 VIEW: CPT

## 2022-09-26 PROCEDURE — 74018 RADEX ABDOMEN 1 VIEW: CPT | Performed by: RADIOLOGY

## 2022-09-26 PROCEDURE — 51798 US URINE CAPACITY MEASURE: CPT | Performed by: NURSE PRACTITIONER

## 2022-09-26 RX ORDER — IBUPROFEN 600 MG/1
600 TABLET ORAL EVERY 6 HOURS PRN
Qty: 60 TABLET | Refills: 2 | Status: SHIPPED | OUTPATIENT
Start: 2022-09-26 | End: 2023-01-26

## 2022-09-26 RX ORDER — TIZANIDINE 2 MG/1
4 TABLET ORAL
COMMUNITY
Start: 2022-09-21

## 2022-09-26 RX ORDER — CELECOXIB 200 MG/1
200 CAPSULE ORAL DAILY PRN
COMMUNITY
Start: 2022-09-21

## 2022-09-26 RX ORDER — OMEPRAZOLE 40 MG/1
40 CAPSULE, DELAYED RELEASE ORAL DAILY
COMMUNITY
Start: 2022-08-17 | End: 2023-01-26

## 2022-09-26 RX ORDER — POLYETHYLENE GLYCOL 3350 17 G/17G
17 POWDER, FOR SOLUTION ORAL DAILY
Qty: 30 EACH | Refills: 3 | Status: SHIPPED | OUTPATIENT
Start: 2022-09-26

## 2022-09-26 NOTE — PROGRESS NOTES
"Chief Complaint  LEFT RENAL STONES/BILATERAL FLANK/ABDOMINAL PAIN (FOLLOW UP KIDNEY STONES-RESOLVED/FLANK PAIN/ABDOMINAL PAIN)    Milind Benítez presents to [facility] for FLANK/ABDOMINAL PAIN  History of Present Illness    MS ANAHI BENÍTEZ IS A 37-year-old Very pleasant patient, with a significant history of recurrent kidney stones, who presents to clinic today for evaluation.This is her yearly follow up for kidney stones. She is in no apparent discomfort today besides complaints of lower back, bilateral flank and abdominal pain. She reports improvement, in her symptoms, but still reports intermittent discomfort.She denies having any urinary symptoms, Her urine dispstick today is COMPLETELY negative for any infection, it is negative for gross/microhematuria. Her PVR is 25ml    We both reviewed/discussed her KUB results today which are unremarkable with no dilation. GASTROINTESTINAL TRACT: Abundant distal colonic stool.  This is characterized by extreme amounts of constipation.  We spoke about the impact of this on bladder function.  We spoke about  its relationship to recurrent urinary tract infections, vaginal pain, pelvic/bladder pressure, back pain, flank pain she is experiencing.     She is a , does not some and is relatively healthy besides a BMI of 39.54. Has history of Lupus, green field filter from left hemiparesis , siezures,  kidney stones, htn.     The rest of her PMHX in charts      Objective   Vital Signs:   Ht 172.7 cm (67.99\")   Wt 118 kg (260 lb)   BMI 39.54 kg/m²       ROS:   Review of Systems   Constitutional: Positive for activity change. Negative for appetite change, chills, diaphoresis, fatigue, fever, unexpected weight gain and unexpected weight loss.   HENT: Negative for congestion, ear discharge, ear pain, nosebleeds, rhinorrhea, sinus pressure and sore throat.    Eyes: Negative for blurred vision, double vision, " photophobia, pain, redness and visual disturbance.   Respiratory: Negative for apnea, cough, chest tightness, shortness of breath, wheezing and stridor.    Cardiovascular: Negative for chest pain, palpitations and leg swelling.   Gastrointestinal: Positive for abdominal distention, abdominal pain, constipation and nausea. Negative for diarrhea and vomiting.   Endocrine: Negative for polydipsia, polyphagia and polyuria.   Genitourinary: Negative for decreased urine volume, difficulty urinating, dyspareunia, dysuria, flank pain, frequency, genital sores, hematuria, pelvic pain, pelvic pressure, urgency, urinary incontinence and vaginal discharge.   Musculoskeletal: Positive for back pain. Negative for arthralgias and joint swelling.   Skin: Negative for pallor, rash and wound.   Neurological: Negative for dizziness, tremors, syncope, weakness, light-headedness, headache, memory problem and confusion.   Psychiatric/Behavioral: Positive for sleep disturbance and stress. Negative for behavioral problems and decreased concentration.        Physical Exam  Constitutional:       General: She is in acute distress.      Appearance: She is well-developed. She is obese.   HENT:      Head: Normocephalic and atraumatic.   Eyes:      Pupils: Pupils are equal, round, and reactive to light.   Neck:      Thyroid: No thyromegaly.      Trachea: No tracheal deviation.   Cardiovascular:      Rate and Rhythm: Normal rate and regular rhythm.      Heart sounds: No murmur heard.  Pulmonary:      Effort: Pulmonary effort is normal. No respiratory distress.      Breath sounds: Normal breath sounds. No stridor. No wheezing.   Abdominal:      General: Bowel sounds are normal. There is distension.      Palpations: Abdomen is soft.      Tenderness: There is abdominal tenderness.   Genitourinary:     Labia:         Right: No tenderness.         Left: No tenderness.       Vagina: Normal. No vaginal discharge.   Musculoskeletal:         General:  Tenderness present. No deformity. Normal range of motion.      Cervical back: Normal range of motion.   Skin:     General: Skin is warm and dry.      Capillary Refill: Capillary refill takes less than 2 seconds.      Coloration: Skin is not pale.      Findings: No erythema or rash.   Neurological:      Mental Status: She is alert and oriented to person, place, and time.      Cranial Nerves: No cranial nerve deficit.      Sensory: No sensory deficit.      Coordination: Coordination normal.   Psychiatric:         Behavior: Behavior normal.         Thought Content: Thought content normal.         Judgment: Judgment normal.        Result Review :     UA    Urinalysis 3/17/22 3/17/22 7/29/22 7/29/22 9/26/22    1225 1225 1253 1253    Squamous Epithelial Cells, UA  3-6 (A)  0-2    Specific Gravity, UA >=1.030  1.021     Ketones, UA Negative  Negative  Negative   Blood, UA Negative  Negative     Leukocytes, UA Negative  Negative  Negative   Nitrite, UA Negative  Negative     RBC, UA  None Seen  0-2    WBC, UA  None Seen  0-2    Bacteria, UA  None Seen  1+ (A)    (A) Abnormal value            Urine Culture    Urine Culture 3/17/22 7/29/22   Urine Culture 50,000 CFU/mL Normal Urogenital Luanne 25,000 CFU/mL Mixed Luanne Isolated           Data reviewed: Radiologic studies KUB done today 09/26/22 negative for stones, with larrge stool burden     Assessment and Plan    Problem List Items Addressed This Visit    None     Visit Diagnoses     Flank pain with history of urolithiasis    -  Primary    Relevant Medications    polyethylene glycol (MiraLax) 17 g packet    linaclotide (Linzess) 145 MCG capsule capsule    ibuprofen (ADVIL,MOTRIN) 600 MG tablet    Other Relevant Orders    XR abdomen kub (Completed)    POC Urinalysis Dipstick, Automated (Completed)    Frequency of urination        Relevant Medications    polyethylene glycol (MiraLax) 17 g packet    linaclotide (Linzess) 145 MCG capsule capsule    Generalized abdominal pain         Relevant Medications    polyethylene glycol (MiraLax) 17 g packet    linaclotide (Linzess) 145 MCG capsule capsule    ibuprofen (ADVIL,MOTRIN) 600 MG tablet    Chronic idiopathic constipation        Relevant Medications    polyethylene glycol (MiraLax) 17 g packet    linaclotide (Linzess) 145 MCG capsule capsule    Left nephrolithiasis        Relevant Medications    ibuprofen (ADVIL,MOTRIN) 600 MG tablet          Patient reports that she is not currently experiencing any symptoms of urinary incontinence.    Class 2 Severe Obesity (BMI >=35 and <=39.9). Obesity-related health conditions include the following: hypertension, dyslipidemias and GERD. Obesity is improving with lifestyle modifications. BMI is 39.54kg/m2. We discussed portion control and increasing exercise.    RADIOLOGY (CT AND/OR KUB):    CT Abdomen and Pelvis: Results for orders placed during the hospital encounter of 12/28/20    CT Abdomen Pelvis With & Without Contrast    Narrative  CT ABDOMEN PELVIS W WO CONTRAST-    REASON FOR EXAM: Abdominal pain, acute, nonlocalized; R10.9-Unspecified  abdominal pain; Z87.442-Personal history of urinary calculi;  R10.84-Generalized abdominal pain    COMPARISON: Today's CT is compared with an earlier exam from 10/31/2019.    TECHNIQUE: Spiral scans were obtained through the abdomen and pelvis  prior to and after the administration of IV contrast.    CT FINDINGS: There is a small 2 mm size nonobstructing stone in the mid  to lower pole collecting system of the left kidney. It is in a  nonobstructing position. No calcifications were noted in the ureters.  They were not dilated. No bladder stones were demonstrated. The liver,  spleen and pancreas are unremarkable. The kidneys show normal cortical  enhancement. The patient has an inferior vena cava filter in the  infrarenal portion of the IVC. The lower limbs of the filter have  migrated outside the IVC; 1 of the limbs is in the L3 vertebral body. In  reviewing  prior studies, this has been present since 2015. The liver and  spleen enhance appropriately and are normal in size and shape. The  gallbladder is surgically absent. The pancreas shows no evidence of  mass. No adrenal lesions were demonstrated. The aorta is normal in  caliber. No renal parenchymal abnormalities were identified. There was  no ascites. The bowel shows no evidence of obstruction. The urinary  bladder is smooth in contour.    Impression  Small nonobstructing stone in the lower pole collecting  system of the left kidney. At the time of the CT there were no findings  of ureteral stone or obstruction. The limbs of the inferior vena cava  filter have migrated outside the lumen of the cava; however, this is  unchanged in comparing with studies back to 2015.    3948.24 mGy.cm  The radiation dose reduction device was utilized for each scan per the  ALARA (as low as reasonably achievable) protocol.    This report was finalized on 12/28/2020 3:19 PM by Dr. Orlando Smith II, MD.     CT Stone Protocol: Results for orders placed during the hospital encounter of 10/31/19    CT Abdomen Pelvis Stone Protocol    Narrative  EXAM: CT ABDOMEN PELVIS STONE PROTOCOL-        TECHNIQUE: Multiple axial CT images were obtained from lung bases  through pubic symphysis WITHOUT administration of IV contrast.  Reformatted images in the coronal and/or sagittal plane(s) were  generated from the axial data set to facilitate diagnostic accuracy  and/or surgical planning.  Oral Contrast:NONE.    Radiation dose reduction techniques were utilized per ALARA protocol.  Automated exposure control was initiated through either or [employer] or  DoseRight software packages by  protocol.    DOSE: 1269.10 mGy.cm    Clinical information  Flank pain, recurrent stone disease suspected; R10.9-Unspecified  abdominal pain; Z87.442-Personal history of urinary calculi    Comparison  NONE.    Findings  LOWER THORAX: Clear. No  effusions.    ABDOMEN:    LIVER: Homogeneous. No focal hepatic mass or ductal dilatation.    GALLBLADDER: PRIOR CHOLECYSTECTOMY.    PANCREAS: Unremarkable. No mass or ductal dilatation.    SPLEEN: Homogeneous. No splenomegaly.    ADRENALS: No mass.    KIDNEYS/URETERS: PREVIOUS URETERAL OBSTRUCTING STONE HAS PASSED. NO  HYDRONEPHROSIS IDENTIFIED ON TODAY'S EXAM. NO URETERAL STONES ARE  IDENTIFIED. THERE ARE MULTIPLE NONOBSTRUCTING LEFT KIDNEY STONES.    GI TRACT: MILD CONSTIPATION. NO BOWEL OBSTRUCTION.    PERITONEUM: SMALL FREE FLUID LOWER PELVIS LIKELY PHYSIOLOGIC.    MESENTERY: Unremarkable.    LYMPH NODES: No lymphadenopathy.    VASCULATURE: IVC FILTER IS NOTED AND STABLE FROM PREVIOUS.    ABDOMINAL WALL: No focal hernia or mass.      OTHER: None.    PELVIS:    BLADDER: No focal mass or significant wall thickening    REPRODUCTIVE: Unremarkable as visualized.    APPENDIX: Nondistended. No surrounding inflammation.    BONES: No acute bony abnormality.    Impression  Impression:  1. Resolution of left-sided hydronephrosis and interval passing of a  ureteral stone.  2. Nonobstructing left kidney stones. No right-sided stones or right  hydronephrosis identified.  3. No acute findings identified.    This report was finalized on 10/31/2019 10:09 AM by Dr. Lambert Norton MD.     KUB: Results for orders placed in visit on 12/01/20    XR Abdomen KUB    Narrative  XR ABDOMEN KUB-    REASON FOR EXAM:  kidney stones; R10.9-Unspecified abdominal pain;  Z87.442-Personal history of urinary calculi    Today's KUB is compared with an earlier study from 2018. Inferior vena  cava filter is noted; its position is unchanged from the earlier exam.  There is a large amount of fecal material in the colon. No definite  calcifications were demonstratable overlying the kidneys or along the  course of the ureters.    Impression  A kidney stone is not demonstrated on today's exam.    This report was finalized on 12/1/2020 1:39 PM by   Orlando Smith II, MD.       LABS (3 MONTHS):    Office Visit on 09/26/2022   Component Date Value Ref Range Status   • Color 09/26/2022 Yellow  Yellow, Straw, Dark Yellow, Greta Final   • Clarity, UA 09/26/2022 Clear  Clear Final   • Specific Gravity  09/26/2022 1.020  1.005 - 1.030 Final   • pH, Urine 09/26/2022 6.0  5.0 - 8.0 Final   • Leukocytes 09/26/2022 Negative  Negative Final   • Nitrite, UA 09/26/2022 Negative  Negative Final   • Protein, POC 09/26/2022 Negative  Negative mg/dL Final   • Glucose, UA 09/26/2022 Negative  Negative mg/dL Final   • Ketones, UA 09/26/2022 Negative  Negative Final   • Urobilinogen, UA 09/26/2022 Normal  Normal, 0.2 E.U./dL Final   • Bilirubin 09/26/2022 Negative  Negative Final   • Blood, UA 09/26/2022 Negative  Negative Final   • Lot Number 09/26/2022 [id number]   Final   • Expiration Date 09/26/2022 [id number]   Final   Lab on 07/29/2022   Component Date Value Ref Range Status   • Sed Rate 07/29/2022 13  0 - 20 mm/hr Final   • C-Reactive Protein 07/29/2022 <0.30  0.00 - 0.50 mg/dL Final   • C4 Complement 07/29/2022 28.0  14.0 - 44.0 mg/dl Final   • C3 Complement 07/29/2022 151.0  82.0 - 167.0 mg/dl Final   • Anti-DNA (DS) Ab Qn 07/29/2022 16 (A) 0 - 9 IU/mL Final                                       Negative      <5                                     Equivocal  5 - 9                                     Positive      >9   • Urine Culture 07/29/2022 25,000 CFU/mL Mixed Luanne Isolated   Final   • WBC 07/29/2022 7.07  3.40 - 10.80 10*3/mm3 Final   • RBC 07/29/2022 4.90  3.77 - 5.28 10*6/mm3 Final   • Hemoglobin 07/29/2022 13.5  12.0 - 15.9 g/dL Final   • Hematocrit 07/29/2022 41.6  34.0 - 46.6 % Final   • MCV 07/29/2022 84.9  79.0 - 97.0 fL Final   • MCH 07/29/2022 27.6  26.6 - 33.0 pg Final   • MCHC 07/29/2022 32.5  31.5 - 35.7 g/dL Final   • RDW 07/29/2022 14.1  12.3 - 15.4 % Final   • RDW-SD 07/29/2022 42.8  37.0 - 54.0 fl Final   • MPV 07/29/2022 11.5  6.0 - 12.0 fL Final    • Platelets 07/29/2022 206  140 - 450 10*3/mm3 Final   • Neutrophil % 07/29/2022 54.4  42.7 - 76.0 % Final   • Lymphocyte % 07/29/2022 33.8  19.6 - 45.3 % Final   • Monocyte % 07/29/2022 9.2  5.0 - 12.0 % Final   • Eosinophil % 07/29/2022 2.0  0.3 - 6.2 % Final   • Basophil % 07/29/2022 0.3  0.0 - 1.5 % Final   • Immature Grans % 07/29/2022 0.3  0.0 - 0.5 % Final   • Neutrophils, Absolute 07/29/2022 3.85  1.70 - 7.00 10*3/mm3 Final   • Lymphocytes, Absolute 07/29/2022 2.39  0.70 - 3.10 10*3/mm3 Final   • Monocytes, Absolute 07/29/2022 0.65  0.10 - 0.90 10*3/mm3 Final   • Eosinophils, Absolute 07/29/2022 0.14  0.00 - 0.40 10*3/mm3 Final   • Basophils, Absolute 07/29/2022 0.02  0.00 - 0.20 10*3/mm3 Final   • Immature Grans, Absolute 07/29/2022 0.02  0.00 - 0.05 10*3/mm3 Final   • nRBC 07/29/2022 0.0  0.0 - 0.2 /100 WBC Final   • Color, UA 07/29/2022 Yellow  Yellow, Straw Final   • Appearance, UA 07/29/2022 Clear  Clear Final   • pH, UA 07/29/2022 6.0  5.0 - 8.0 Final   • Specific Gravity, UA 07/29/2022 1.021  1.005 - 1.030 Final   • Glucose, UA 07/29/2022 Negative  Negative Final   • Ketones, UA 07/29/2022 Negative  Negative Final   • Bilirubin, UA 07/29/2022 Negative  Negative Final   • Blood, UA 07/29/2022 Negative  Negative Final   • Protein, UA 07/29/2022 100 mg/dL (2+) (A) Negative Final   • Leuk Esterase, UA 07/29/2022 Negative  Negative Final   • Nitrite, UA 07/29/2022 Negative  Negative Final   • Urobilinogen, UA 07/29/2022 0.2 E.U./dL  0.2 - 1.0 E.U./dL Final   • RBC, UA 07/29/2022 0-2  None Seen, 0-2 /HPF Final   • WBC, UA 07/29/2022 0-2  None Seen, 0-2 /HPF Final   • Bacteria, UA 07/29/2022 1+ (A) None Seen /HPF Final   • Squamous Epithelial Cells, UA 07/29/2022 0-2  None Seen, 0-2 /HPF Final   • Hyaline Casts, UA 07/29/2022 0-2  None Seen /LPF Final   • Methodology 07/29/2022 Automated Microscopy   Final   Office Visit on 07/26/2022   Component Date Value Ref Range Status   • Glucose 07/26/2022 95  65 -  99 mg/dL Final   • BUN 07/26/2022 13  6 - 20 mg/dL Final   • Creatinine 07/26/2022 0.70  0.57 - 1.00 mg/dL Final   • Sodium 07/26/2022 138  136 - 145 mmol/L Final   • Potassium 07/26/2022 4.1  3.5 - 5.2 mmol/L Final   • Chloride 07/26/2022 104  98 - 107 mmol/L Final   • CO2 07/26/2022 23.0  22.0 - 29.0 mmol/L Final   • Calcium 07/26/2022 9.5  8.6 - 10.5 mg/dL Final   • Total Protein 07/26/2022 6.6  6.0 - 8.5 g/dL Final   • Albumin 07/26/2022 4.50  3.50 - 5.20 g/dL Final   • ALT (SGPT) 07/26/2022 13  1 - 33 U/L Final   • AST (SGOT) 07/26/2022 14  1 - 32 U/L Final   • Alkaline Phosphatase 07/26/2022 63  39 - 117 U/L Final   • Total Bilirubin 07/26/2022 0.4  0.0 - 1.2 mg/dL Final   • Globulin 07/26/2022 2.1  gm/dL Final   • A/G Ratio 07/26/2022 2.1  g/dL Final   • BUN/Creatinine Ratio 07/26/2022 18.6  7.0 - 25.0 Final   • Anion Gap 07/26/2022 11.0  5.0 - 15.0 mmol/L Final   • eGFR 07/26/2022 113.7  >60.0 mL/min/1.73 Final    National Kidney Foundation and American Society of Nephrology (ASN) Task Force recommended calculation based on the Chronic Kidney Disease Epidemiology Collaboration (CKD-EPI) equation refit without adjustment for race.   • Total Cholesterol 07/26/2022 157  0 - 200 mg/dL Final   • Triglycerides 07/26/2022 191 (A) 0 - 150 mg/dL Final   • HDL Cholesterol 07/26/2022 46  40 - 60 mg/dL Final   • LDL Cholesterol  07/26/2022 79  0 - 100 mg/dL Final   • VLDL Cholesterol 07/26/2022 32  5 - 40 mg/dL Final   • LDL/HDL Ratio 07/26/2022 1.58   Final   • TSH 07/26/2022 1.000  0.270 - 4.200 uIU/mL Final   • WBC 07/26/2022 6.14  3.40 - 10.80 10*3/mm3 Final   • RBC 07/26/2022 4.80  3.77 - 5.28 10*6/mm3 Final   • Hemoglobin 07/26/2022 13.3  12.0 - 15.9 g/dL Final   • Hematocrit 07/26/2022 40.0  34.0 - 46.6 % Final   • MCV 07/26/2022 83.3  79.0 - 97.0 fL Final   • MCH 07/26/2022 27.7  26.6 - 33.0 pg Final   • MCHC 07/26/2022 33.3  31.5 - 35.7 g/dL Final   • RDW 07/26/2022 14.0  12.3 - 15.4 % Final   • RDW-SD  07/26/2022 41.9  37.0 - 54.0 fl Final   • MPV 07/26/2022 11.7  6.0 - 12.0 fL Final   • Platelets 07/26/2022 186  140 - 450 10*3/mm3 Final   • Neutrophil % 07/26/2022 62.3  42.7 - 76.0 % Final   • Lymphocyte % 07/26/2022 27.4  19.6 - 45.3 % Final   • Monocyte % 07/26/2022 7.5  5.0 - 12.0 % Final   • Eosinophil % 07/26/2022 2.3  0.3 - 6.2 % Final   • Basophil % 07/26/2022 0.3  0.0 - 1.5 % Final   • Immature Grans % 07/26/2022 0.2  0.0 - 0.5 % Final   • Neutrophils, Absolute 07/26/2022 3.83  1.70 - 7.00 10*3/mm3 Final   • Lymphocytes, Absolute 07/26/2022 1.68  0.70 - 3.10 10*3/mm3 Final   • Monocytes, Absolute 07/26/2022 0.46  0.10 - 0.90 10*3/mm3 Final   • Eosinophils, Absolute 07/26/2022 0.14  0.00 - 0.40 10*3/mm3 Final   • Basophils, Absolute 07/26/2022 0.02  0.00 - 0.20 10*3/mm3 Final   • Immature Grans, Absolute 07/26/2022 0.01  0.00 - 0.05 10*3/mm3 Final   • nRBC 07/26/2022 0.0  0.0 - 0.2 /100 WBC Final                                                  ASSESSMENT                BILATERAL FLANK PAIN/ABDOMINAL PAIN/IBS-C    MS ANAHI POWELL IS A 37-year-old Very pleasant patient, with a significant history of recurrent kidney stones, who presents to clinic today for evaluation.This is her yearly follow up for kidney stones. She is in no apparent discomfort today besides complaints of lower back, bilateral flank and abdominal pain. She denies having any urinary symptoms,Her urine dispstick today is COMPLETELY negative for any infection, it is negative for gross/microhematuria. Her PVR is 25ml    Her KUB results are unremarkable for any kidney stones. As such, Discussed a kidney stone prevention diet to include increasing p.o. fluid intake, to at least 1 to 2 L of water daily.  She is to avoid caffeine products such as cola, coffee, and to avoid soft or soda drinks.  She is to decrease her sodium consumption as in  Fast foods, hills, salted nuts, canned foods, and smoked/cured foods. She is also to decrease her oxalate  consumption, as in spinach, Zach greens, and Rhubarb.  Also important is to decrease protein intake, as in red meats, peanut butter, and also avoid nuts.    IBS- Patient has significant irritable bowel syndrome, characterized by Extreme amounts of constipation.  We spoke about the impact of this on bladder function.  We spoke about  its relationship to recurrent urinary tract infections. We discussed the need for increasing p.o. fluid intake to at least 2 to 3 L of water daily, and discussed the physiology of colonic motility as well as use of MiraLAX as a bulk laxative versus the newer class of serotonin uptake blockers such as Linzess.  We stressed the need for a daily bowel movement and discussed the [place] stool scale at length. We also discussed a referral to the GI nurse practitioner.     We discussed treatment options for her flank pain with patient encouraged to continue conservative therapy alternating NSAID/Tylenol as tolerated, Also including hot baths, showers, warm compresses to lower back as tolerated. Also encouraged walking, physical therapy, light exercises as tolerated    Rest is the most important treatment in the case of flank pain. Rest and physical therapy are enough to improve minor pain. Discussed to monitor her daily routine with prevention of flank pain by: At least Drinking 8 glasses of water per day, Limiting your alcohol consumption.  Having a healthy diet containing fruits, vegetables, and a lot of fluids, Practicing safe sex.  Also maintaining proper hygiene of your body as well as the environment.      PLAN  Encouraged to continue daily Miralax, and increasing veggies in her diet    Follow-up in clinic post procedure as discussed,     May return sooner if need be    Patient is agreeable plan of care    Smoking Cessation Counseling:  Never a smoker.  Patient does not currently use any tobacco products.     Follow Up   Return if symptoms worsen or fail to improve, for Next scheduled  follow up FOR ABDOMINAL /BILATERAL FLANK PAIN/KIDNEY STONES.    Patient was given instructions and counseling regarding her condition or for health maintenance advice. Please see specific information pulled into the AVS if appropriate.          This document has been electronically signed by Griselda Cheng-Akwa, APRN   September 26, 2022 10:17 EDT      Dictated Utilizing Dragon Dictation: Part of this note may be an electronic transcription/translation of spoken language to printed text using the Dragon Dictation System.

## 2023-01-26 ENCOUNTER — OFFICE VISIT (OUTPATIENT)
Dept: FAMILY MEDICINE CLINIC | Facility: CLINIC | Age: 38
End: 2023-01-26
Payer: COMMERCIAL

## 2023-01-26 VITALS
OXYGEN SATURATION: 97 % | DIASTOLIC BLOOD PRESSURE: 72 MMHG | HEIGHT: 68 IN | SYSTOLIC BLOOD PRESSURE: 112 MMHG | TEMPERATURE: 97.8 F | BODY MASS INDEX: 39.25 KG/M2 | HEART RATE: 100 BPM | RESPIRATION RATE: 18 BRPM | WEIGHT: 259 LBS

## 2023-01-26 DIAGNOSIS — I10 ESSENTIAL HYPERTENSION: Primary | Chronic | ICD-10-CM

## 2023-01-26 DIAGNOSIS — Z23 NEED FOR INFLUENZA VACCINATION: ICD-10-CM

## 2023-01-26 DIAGNOSIS — Z13.29 SCREENING FOR THYROID DISORDER: ICD-10-CM

## 2023-01-26 DIAGNOSIS — K21.9 GASTROESOPHAGEAL REFLUX DISEASE, UNSPECIFIED WHETHER ESOPHAGITIS PRESENT: Chronic | ICD-10-CM

## 2023-01-26 PROCEDURE — 85025 COMPLETE CBC W/AUTO DIFF WBC: CPT | Performed by: NURSE PRACTITIONER

## 2023-01-26 PROCEDURE — 80061 LIPID PANEL: CPT | Performed by: NURSE PRACTITIONER

## 2023-01-26 PROCEDURE — 90686 IIV4 VACC NO PRSV 0.5 ML IM: CPT | Performed by: NURSE PRACTITIONER

## 2023-01-26 PROCEDURE — 90471 IMMUNIZATION ADMIN: CPT | Performed by: NURSE PRACTITIONER

## 2023-01-26 PROCEDURE — 99214 OFFICE O/P EST MOD 30 MIN: CPT | Performed by: NURSE PRACTITIONER

## 2023-01-26 PROCEDURE — 80053 COMPREHEN METABOLIC PANEL: CPT | Performed by: NURSE PRACTITIONER

## 2023-01-26 RX ORDER — PANTOPRAZOLE SODIUM 40 MG/1
40 TABLET, DELAYED RELEASE ORAL DAILY
Qty: 90 TABLET | Refills: 1 | Status: SHIPPED | OUTPATIENT
Start: 2023-01-26

## 2023-01-26 RX ORDER — LOSARTAN POTASSIUM 50 MG/1
50 TABLET ORAL DAILY
Qty: 90 TABLET | Refills: 1 | Status: SHIPPED | OUTPATIENT
Start: 2023-01-26

## 2023-01-26 NOTE — PROGRESS NOTES
Immunization  Immunization performed in left arm by Ivana Kumar MA. Patient tolerated the procedure well without complications.  01/26/23   Ivana Kumar MA

## 2023-01-26 NOTE — PROGRESS NOTES
Venipuncture Blood Specimen Collection  Venipuncture performed in right arm by Ivana Kumar MA with good hemostasis. Patient tolerated the procedure well without complications.   01/26/23   Ivana Kumar MA

## 2023-01-26 NOTE — PROGRESS NOTES
"Milind Benítez is a 37 y.o. female.     Chief Complaint   Patient presents with   • Hypertension       History of Present Illness  She presents for follow up essential hypertension and gerd. She reports good blood pressure control. She states she has nausea in the am and pms. She states the protonix helps. She has had her stomach evaluated in the past.        The following portions of the patient's history were reviewed and updated as appropriate: allergies, current medications, past family history, past medical history, past social history, past surgical history and problem list.    Review of Systems   Constitutional: Negative for fatigue, fever and unexpected weight change.   Respiratory: Negative for cough, chest tightness and shortness of breath.    Cardiovascular: Negative for chest pain, palpitations and leg swelling.   Gastrointestinal: Positive for nausea. Negative for abdominal pain, blood in stool, constipation, diarrhea and vomiting.   Genitourinary: Negative for dysuria and hematuria.   Allergic/Immunologic: Negative for environmental allergies.   Psychiatric/Behavioral: Negative for suicidal ideas. The patient is not nervous/anxious.        Objective     /72 (BP Location: Right arm, Patient Position: Sitting, Cuff Size: Adult)   Pulse 100   Temp 97.8 °F (36.6 °C) (Temporal)   Resp 18   Ht 172.7 cm (67.99\")   Wt 117 kg (259 lb)   SpO2 97%   BMI 39.39 kg/m²     Physical Exam  Vitals reviewed.   Constitutional:       General: She is not in acute distress.     Appearance: Normal appearance. She is well-developed. She is not diaphoretic.   HENT:      Head: Normocephalic and atraumatic.   Cardiovascular:      Rate and Rhythm: Normal rate and regular rhythm.      Heart sounds: Normal heart sounds, S1 normal and S2 normal. No murmur heard.    No friction rub. No gallop.   Pulmonary:      Effort: Pulmonary effort is normal. No respiratory distress.      Breath sounds: Normal breath " sounds.   Abdominal:      General: Bowel sounds are normal. There is no distension.      Palpations: Abdomen is soft.      Tenderness: There is no abdominal tenderness.   Skin:     General: Skin is warm and dry.   Neurological:      Mental Status: She is alert and oriented to person, place, and time.   Psychiatric:         Behavior: Behavior normal.         Thought Content: Thought content normal.         Judgment: Judgment normal.         Current Outpatient Medications   Medication Sig Dispense Refill   • celecoxib (CeleBREX) 200 MG capsule Take 200 mg by mouth Daily As Needed.     • DULoxetine (CYMBALTA) 60 MG capsule Take 1 capsule by mouth Daily. 30 capsule 0   • hydroxychloroquine (PLAQUENIL) 200 MG tablet Take 200 mg by mouth 2 (Two) Times a Day.     • lamoTRIgine (LaMICtal) 100 MG tablet Take 100 mg by mouth 2 (Two) Times a Day.     • losartan (Cozaar) 50 MG tablet Take 1 tablet by mouth Daily. 90 tablet 1   • melatonin 5 MG tablet tablet Take 5 mg by mouth Every Night.     • pantoprazole (PROTONIX) 40 MG EC tablet Take 1 tablet by mouth Daily. 90 tablet 1   • polyethylene glycol (MiraLax) 17 g packet Mix 17 grams of powder in 4 to 8 ounces of liquid and take by mouth Daily. 30 each 3   • SUMAtriptan (IMITREX) 100 MG tablet Take one tablet at onset of headache. May repeat dose one time in 2 hours if headache not relieved. 6 tablet 0   • tiZANidine (ZANAFLEX) 2 MG tablet Take 4 mg by mouth every night at bedtime.       No current facility-administered medications for this visit.            Assessment & Plan     Problem List Items Addressed This Visit        Cardiac and Vasculature    Essential hypertension - Primary    Relevant Medications    losartan (Cozaar) 50 MG tablet    Other Relevant Orders    CBC & Differential    Comprehensive Metabolic Panel    Lipid Panel       Gastrointestinal Abdominal     Gastroesophageal reflux disease    Relevant Medications    pantoprazole (PROTONIX) 40 MG EC tablet   Other  Visit Diagnoses     Need for influenza vaccination        Relevant Orders    FluLaval/Fluarix/Fluzone >6 Months (Completed)            ICD-10-CM ICD-9-CM   1. Essential hypertension  I10 401.9   2. Gastroesophageal reflux disease, unspecified whether esophagitis present  K21.9 530.81   3. Need for influenza vaccination  Z23 V04.81     Plan: Continue current meds. Get labs today. Flu vaccine ordered with her consent. Follow up in 6 months and as needed.              Understands disease processes and need for medications.  Understands reasons for urgent and emergent care.  Patient (& family) verbalized agreement for treatment plan.   Emotional support and active listening provided.  Patient provided time to verbalize feelings.           Class 2 Severe Obesity (BMI >=35 and <=39.9). Obesity-related health conditions include the following: hypertension. Obesity is unchanged. BMI is is above average; BMI management plan is completed. We  included info in summary.      This document has been electronically signed by DANIEL Restrepo   January 26, 2023 15:46 EST

## 2023-01-27 LAB
ALBUMIN SERPL-MCNC: 5 G/DL (ref 3.5–5.2)
ALBUMIN/GLOB SERPL: 2.1 G/DL
ALP SERPL-CCNC: 61 U/L (ref 39–117)
ALT SERPL W P-5'-P-CCNC: 13 U/L (ref 1–33)
ANION GAP SERPL CALCULATED.3IONS-SCNC: 9.8 MMOL/L (ref 5–15)
AST SERPL-CCNC: 15 U/L (ref 1–32)
BASOPHILS # BLD AUTO: 0.03 10*3/MM3 (ref 0–0.2)
BASOPHILS NFR BLD AUTO: 0.4 % (ref 0–1.5)
BILIRUB SERPL-MCNC: <0.2 MG/DL (ref 0–1.2)
BUN SERPL-MCNC: 18 MG/DL (ref 6–20)
BUN/CREAT SERPL: 21.4 (ref 7–25)
CALCIUM SPEC-SCNC: 9.5 MG/DL (ref 8.6–10.5)
CHLORIDE SERPL-SCNC: 104 MMOL/L (ref 98–107)
CHOLEST SERPL-MCNC: 160 MG/DL (ref 0–200)
CO2 SERPL-SCNC: 25.2 MMOL/L (ref 22–29)
CREAT SERPL-MCNC: 0.84 MG/DL (ref 0.57–1)
DEPRECATED RDW RBC AUTO: 40.8 FL (ref 37–54)
EGFRCR SERPLBLD CKD-EPI 2021: 91.3 ML/MIN/1.73
EOSINOPHIL # BLD AUTO: 0.12 10*3/MM3 (ref 0–0.4)
EOSINOPHIL NFR BLD AUTO: 1.7 % (ref 0.3–6.2)
ERYTHROCYTE [DISTWIDTH] IN BLOOD BY AUTOMATED COUNT: 13.8 % (ref 12.3–15.4)
GLOBULIN UR ELPH-MCNC: 2.4 GM/DL
GLUCOSE SERPL-MCNC: 67 MG/DL (ref 65–99)
HCT VFR BLD AUTO: 41.7 % (ref 34–46.6)
HDLC SERPL-MCNC: 60 MG/DL (ref 40–60)
HGB BLD-MCNC: 13.4 G/DL (ref 12–15.9)
IMM GRANULOCYTES # BLD AUTO: 0.02 10*3/MM3 (ref 0–0.05)
IMM GRANULOCYTES NFR BLD AUTO: 0.3 % (ref 0–0.5)
LDLC SERPL CALC-MCNC: 74 MG/DL (ref 0–100)
LDLC/HDLC SERPL: 1.16 {RATIO}
LYMPHOCYTES # BLD AUTO: 2.42 10*3/MM3 (ref 0.7–3.1)
LYMPHOCYTES NFR BLD AUTO: 35.2 % (ref 19.6–45.3)
MCH RBC QN AUTO: 26.5 PG (ref 26.6–33)
MCHC RBC AUTO-ENTMCNC: 32.1 G/DL (ref 31.5–35.7)
MCV RBC AUTO: 82.4 FL (ref 79–97)
MONOCYTES # BLD AUTO: 0.57 10*3/MM3 (ref 0.1–0.9)
MONOCYTES NFR BLD AUTO: 8.3 % (ref 5–12)
NEUTROPHILS NFR BLD AUTO: 3.71 10*3/MM3 (ref 1.7–7)
NEUTROPHILS NFR BLD AUTO: 54.1 % (ref 42.7–76)
NRBC BLD AUTO-RTO: 0 /100 WBC (ref 0–0.2)
PLATELET # BLD AUTO: 213 10*3/MM3 (ref 140–450)
PMV BLD AUTO: 11.2 FL (ref 6–12)
POTASSIUM SERPL-SCNC: 4 MMOL/L (ref 3.5–5.2)
PROT SERPL-MCNC: 7.4 G/DL (ref 6–8.5)
RBC # BLD AUTO: 5.06 10*6/MM3 (ref 3.77–5.28)
SODIUM SERPL-SCNC: 139 MMOL/L (ref 136–145)
TRIGL SERPL-MCNC: 152 MG/DL (ref 0–150)
VLDLC SERPL-MCNC: 26 MG/DL (ref 5–40)
WBC NRBC COR # BLD: 6.87 10*3/MM3 (ref 3.4–10.8)

## 2023-07-25 ENCOUNTER — OFFICE VISIT (OUTPATIENT)
Dept: FAMILY MEDICINE CLINIC | Facility: CLINIC | Age: 38
End: 2023-07-25
Payer: COMMERCIAL

## 2023-07-25 VITALS
TEMPERATURE: 97.1 F | OXYGEN SATURATION: 95 % | RESPIRATION RATE: 18 BRPM | SYSTOLIC BLOOD PRESSURE: 138 MMHG | HEART RATE: 106 BPM | DIASTOLIC BLOOD PRESSURE: 80 MMHG | BODY MASS INDEX: 38.95 KG/M2 | WEIGHT: 257 LBS | HEIGHT: 68 IN

## 2023-07-25 DIAGNOSIS — K21.9 GASTROESOPHAGEAL REFLUX DISEASE, UNSPECIFIED WHETHER ESOPHAGITIS PRESENT: Chronic | ICD-10-CM

## 2023-07-25 DIAGNOSIS — I10 ESSENTIAL HYPERTENSION: Primary | Chronic | ICD-10-CM

## 2023-07-25 PROCEDURE — 1160F RVW MEDS BY RX/DR IN RCRD: CPT | Performed by: NURSE PRACTITIONER

## 2023-07-25 PROCEDURE — 3079F DIAST BP 80-89 MM HG: CPT | Performed by: NURSE PRACTITIONER

## 2023-07-25 PROCEDURE — 3075F SYST BP GE 130 - 139MM HG: CPT | Performed by: NURSE PRACTITIONER

## 2023-07-25 PROCEDURE — 1159F MED LIST DOCD IN RCRD: CPT | Performed by: NURSE PRACTITIONER

## 2023-07-25 PROCEDURE — 99213 OFFICE O/P EST LOW 20 MIN: CPT | Performed by: NURSE PRACTITIONER

## 2023-07-25 RX ORDER — PANTOPRAZOLE SODIUM 40 MG/1
40 TABLET, DELAYED RELEASE ORAL DAILY
Qty: 90 TABLET | Refills: 1 | Status: SHIPPED | OUTPATIENT
Start: 2023-07-25

## 2023-07-25 RX ORDER — LOSARTAN POTASSIUM 50 MG/1
50 TABLET ORAL DAILY
Qty: 90 TABLET | Refills: 1 | Status: SHIPPED | OUTPATIENT
Start: 2023-07-25

## 2023-07-25 NOTE — PROGRESS NOTES
"Milind Benítez is a 38 y.o. female.     Chief Complaint   Patient presents with    Hypertension       History of Present Illness  She presents for follow up of essential hypertension. She reports good blood pressure control. She reports good compliance with losartan.      The following portions of the patient's history were reviewed and updated as appropriate: allergies, current medications, past family history, past medical history, past social history, past surgical history and problem list.    Review of Systems   Constitutional:  Negative for fatigue, fever and unexpected weight change.   Respiratory:  Negative for cough, chest tightness and shortness of breath.    Cardiovascular:  Negative for chest pain and palpitations.   Gastrointestinal:  Positive for nausea. Negative for abdominal pain, blood in stool, constipation, diarrhea and vomiting.   Genitourinary:  Negative for dysuria and hematuria.   Skin:  Positive for color change.   Psychiatric/Behavioral:  Negative for suicidal ideas.      Objective     /80 (BP Location: Right arm, Patient Position: Sitting, Cuff Size: Adult)   Pulse 106   Temp 97.1 °F (36.2 °C) (Temporal)   Resp 18   Ht 172.7 cm (67.99\")   Wt 117 kg (257 lb)   SpO2 95%   BMI 39.09 kg/m²     Physical Exam  Vitals reviewed.   Constitutional:       General: She is not in acute distress.     Appearance: Normal appearance. She is well-developed. She is not diaphoretic.   HENT:      Head: Normocephalic and atraumatic.   Cardiovascular:      Rate and Rhythm: Regular rhythm. Tachycardia present.      Heart sounds: Normal heart sounds, S1 normal and S2 normal. No murmur heard.    No friction rub. No gallop.   Pulmonary:      Effort: Pulmonary effort is normal. No respiratory distress.      Breath sounds: Normal breath sounds.   Abdominal:      General: Bowel sounds are normal. There is no distension.      Palpations: Abdomen is soft.      Tenderness: There is no abdominal " tenderness.   Skin:     General: Skin is warm and dry.   Neurological:      Mental Status: She is alert and oriented to person, place, and time.   Psychiatric:         Behavior: Behavior normal.         Thought Content: Thought content normal.         Judgment: Judgment normal.       Current Outpatient Medications   Medication Sig Dispense Refill    celecoxib (CeleBREX) 200 MG capsule Take 1 capsule by mouth Daily As Needed.      DULoxetine (CYMBALTA) 60 MG capsule Take 1 capsule by mouth Daily. 30 capsule 0    hydroxychloroquine (PLAQUENIL) 200 MG tablet Take 1 tablet by mouth 2 (Two) Times a Day.      lamoTRIgine (LaMICtal) 100 MG tablet Take 1 tablet by mouth 2 (Two) Times a Day.      losartan (Cozaar) 50 MG tablet Take 1 tablet by mouth Daily. 90 tablet 1    melatonin 5 MG tablet tablet Take 1 tablet by mouth Every Night.      pantoprazole (PROTONIX) 40 MG EC tablet Take 1 tablet by mouth Daily. 90 tablet 1    polyethylene glycol (MiraLax) 17 g packet Mix 17 grams of powder in 4 to 8 ounces of liquid and take by mouth Daily. 30 each 3    SUMAtriptan (IMITREX) 100 MG tablet Take one tablet at onset of headache. May repeat dose one time in 2 hours if headache not relieved. 6 tablet 0    tiZANidine (ZANAFLEX) 2 MG tablet Take 2 tablets by mouth every night at bedtime.       No current facility-administered medications for this visit.            Assessment & Plan     Problem List Items Addressed This Visit          Cardiac and Vasculature    Essential hypertension    Relevant Medications    losartan (Cozaar) 50 MG tablet       Gastrointestinal Abdominal     Gastroesophageal reflux disease    Relevant Medications    pantoprazole (PROTONIX) 40 MG EC tablet         ICD-10-CM ICD-9-CM   1. Essential hypertension  I10 401.9   2. Gastroesophageal reflux disease, unspecified whether esophagitis present  K21.9 530.81       Plan: Continue current meds. CBC, CMP, lipids, tsh reviewed. Follow up in 6 months and as  needed.    @Body mass index is 39.09 kg/m².              Understands disease processes and need for medications.  Understands reasons for urgent and emergent care.  Patient (& family) verbalized agreement for treatment plan.   Emotional support and active listening provided.  Patient provided time to verbalize feelings.                  This document has been electronically signed by DANIEL Restrepo   July 25, 2023 14:49 EDT

## 2023-08-23 ENCOUNTER — LAB (OUTPATIENT)
Dept: LAB | Facility: HOSPITAL | Age: 38
End: 2023-08-23
Payer: COMMERCIAL

## 2023-08-23 ENCOUNTER — TRANSCRIBE ORDERS (OUTPATIENT)
Dept: ADMINISTRATIVE | Facility: HOSPITAL | Age: 38
End: 2023-08-23
Payer: COMMERCIAL

## 2023-08-23 DIAGNOSIS — M32.9 SYSTEMIC LUPUS ERYTHEMATOSUS, UNSPECIFIED SLE TYPE, UNSPECIFIED ORGAN INVOLVEMENT STATUS: ICD-10-CM

## 2023-08-23 DIAGNOSIS — Z79.899 OTHER LONG TERM (CURRENT) DRUG THERAPY: ICD-10-CM

## 2023-08-23 DIAGNOSIS — E55.9 VITAMIN D DEFICIENCY: Primary | ICD-10-CM

## 2023-08-23 DIAGNOSIS — I82.90 ACUTE EMBOLISM AND THROMBOSIS OF UNSPECIFIED VEIN: ICD-10-CM

## 2023-08-23 DIAGNOSIS — E55.9 VITAMIN D DEFICIENCY: ICD-10-CM

## 2023-08-23 PROCEDURE — 86160 COMPLEMENT ANTIGEN: CPT

## 2023-08-23 PROCEDURE — 36415 COLL VENOUS BLD VENIPUNCTURE: CPT

## 2023-08-23 PROCEDURE — 86255 FLUORESCENT ANTIBODY SCREEN: CPT

## 2023-08-23 PROCEDURE — 80053 COMPREHEN METABOLIC PANEL: CPT

## 2023-08-23 PROCEDURE — 81001 URINALYSIS AUTO W/SCOPE: CPT

## 2023-08-23 PROCEDURE — 85025 COMPLETE CBC W/AUTO DIFF WBC: CPT

## 2023-08-24 LAB
ALBUMIN SERPL-MCNC: 4.6 G/DL (ref 3.5–5.2)
ALBUMIN/GLOB SERPL: 1.9 G/DL
ALP SERPL-CCNC: 54 U/L (ref 39–117)
ALT SERPL W P-5'-P-CCNC: 12 U/L (ref 1–33)
ANION GAP SERPL CALCULATED.3IONS-SCNC: 13.8 MMOL/L (ref 5–15)
AST SERPL-CCNC: 13 U/L (ref 1–32)
BACTERIA UR QL AUTO: ABNORMAL /HPF
BASOPHILS # BLD AUTO: 0.02 10*3/MM3 (ref 0–0.2)
BASOPHILS NFR BLD AUTO: 0.3 % (ref 0–1.5)
BILIRUB SERPL-MCNC: 0.3 MG/DL (ref 0–1.2)
BILIRUB UR QL STRIP: NEGATIVE
BUN SERPL-MCNC: 19 MG/DL (ref 6–20)
BUN/CREAT SERPL: 28.8 (ref 7–25)
C3 SERPL-MCNC: 134 MG/DL (ref 82–167)
C4 SERPL-MCNC: 24 MG/DL (ref 14–44)
CALCIUM SPEC-SCNC: 9.7 MG/DL (ref 8.6–10.5)
CHLORIDE SERPL-SCNC: 105 MMOL/L (ref 98–107)
CLARITY UR: CLEAR
CO2 SERPL-SCNC: 22.2 MMOL/L (ref 22–29)
COLOR UR: YELLOW
CREAT SERPL-MCNC: 0.66 MG/DL (ref 0.57–1)
DEPRECATED RDW RBC AUTO: 43.5 FL (ref 37–54)
EGFRCR SERPLBLD CKD-EPI 2021: 114.6 ML/MIN/1.73
EOSINOPHIL # BLD AUTO: 0.14 10*3/MM3 (ref 0–0.4)
EOSINOPHIL NFR BLD AUTO: 1.8 % (ref 0.3–6.2)
ERYTHROCYTE [DISTWIDTH] IN BLOOD BY AUTOMATED COUNT: 13.9 % (ref 12.3–15.4)
GLOBULIN UR ELPH-MCNC: 2.4 GM/DL
GLUCOSE SERPL-MCNC: 78 MG/DL (ref 65–99)
GLUCOSE UR STRIP-MCNC: NEGATIVE MG/DL
HCT VFR BLD AUTO: 40.6 % (ref 34–46.6)
HGB BLD-MCNC: 13.3 G/DL (ref 12–15.9)
HGB UR QL STRIP.AUTO: NEGATIVE
HYALINE CASTS UR QL AUTO: ABNORMAL /LPF
IMM GRANULOCYTES # BLD AUTO: 0.03 10*3/MM3 (ref 0–0.05)
IMM GRANULOCYTES NFR BLD AUTO: 0.4 % (ref 0–0.5)
KETONES UR QL STRIP: NEGATIVE
LEUKOCYTE ESTERASE UR QL STRIP.AUTO: NEGATIVE
LYMPHOCYTES # BLD AUTO: 3.38 10*3/MM3 (ref 0.7–3.1)
LYMPHOCYTES NFR BLD AUTO: 42.6 % (ref 19.6–45.3)
MCH RBC QN AUTO: 28 PG (ref 26.6–33)
MCHC RBC AUTO-ENTMCNC: 32.8 G/DL (ref 31.5–35.7)
MCV RBC AUTO: 85.5 FL (ref 79–97)
MONOCYTES # BLD AUTO: 0.78 10*3/MM3 (ref 0.1–0.9)
MONOCYTES NFR BLD AUTO: 9.8 % (ref 5–12)
NEUTROPHILS NFR BLD AUTO: 3.59 10*3/MM3 (ref 1.7–7)
NEUTROPHILS NFR BLD AUTO: 45.1 % (ref 42.7–76)
NITRITE UR QL STRIP: NEGATIVE
NRBC BLD AUTO-RTO: 0 /100 WBC (ref 0–0.2)
PH UR STRIP.AUTO: 6 [PH] (ref 5–8)
PLATELET # BLD AUTO: 222 10*3/MM3 (ref 140–450)
PMV BLD AUTO: 11.1 FL (ref 6–12)
POTASSIUM SERPL-SCNC: 4.2 MMOL/L (ref 3.5–5.2)
PROT SERPL-MCNC: 7 G/DL (ref 6–8.5)
PROT UR QL STRIP: ABNORMAL
RBC # BLD AUTO: 4.75 10*6/MM3 (ref 3.77–5.28)
RBC # UR STRIP: ABNORMAL /HPF
REF LAB TEST METHOD: ABNORMAL
SODIUM SERPL-SCNC: 141 MMOL/L (ref 136–145)
SP GR UR STRIP: 1.02 (ref 1–1.03)
SQUAMOUS #/AREA URNS HPF: ABNORMAL /HPF
UROBILINOGEN UR QL STRIP: ABNORMAL
WBC # UR STRIP: ABNORMAL /HPF
WBC NRBC COR # BLD: 7.94 10*3/MM3 (ref 3.4–10.8)

## 2023-08-28 LAB
DSDNA AB SER QL CLIF: POSITIVE
DSDNA AB TITR SER CLIF: ABNORMAL {TITER}

## 2023-10-30 ENCOUNTER — TRANSCRIBE ORDERS (OUTPATIENT)
Dept: ADMINISTRATIVE | Facility: HOSPITAL | Age: 38
End: 2023-10-30
Payer: COMMERCIAL

## 2023-10-30 ENCOUNTER — LAB (OUTPATIENT)
Dept: LAB | Facility: HOSPITAL | Age: 38
End: 2023-10-30
Payer: COMMERCIAL

## 2023-10-30 DIAGNOSIS — N28.9 ABNORMAL RENAL FUNCTION: ICD-10-CM

## 2023-10-30 DIAGNOSIS — N28.9 ABNORMAL RENAL FUNCTION: Primary | ICD-10-CM

## 2023-10-30 LAB
ALBUMIN SERPL-MCNC: 5 G/DL (ref 3.5–5.2)
ANION GAP SERPL CALCULATED.3IONS-SCNC: 10.5 MMOL/L (ref 5–15)
BASOPHILS # BLD AUTO: 0.03 10*3/MM3 (ref 0–0.2)
BASOPHILS NFR BLD AUTO: 0.3 % (ref 0–1.5)
BUN SERPL-MCNC: 15 MG/DL (ref 6–20)
BUN/CREAT SERPL: 24.2 (ref 7–25)
CALCIUM SPEC-SCNC: 9.4 MG/DL (ref 8.6–10.5)
CHLORIDE SERPL-SCNC: 106 MMOL/L (ref 98–107)
CO2 SERPL-SCNC: 21.5 MMOL/L (ref 22–29)
CREAT SERPL-MCNC: 0.62 MG/DL (ref 0.57–1)
DEPRECATED RDW RBC AUTO: 41.9 FL (ref 37–54)
EGFRCR SERPLBLD CKD-EPI 2021: 116.3 ML/MIN/1.73
EOSINOPHIL # BLD AUTO: 0.17 10*3/MM3 (ref 0–0.4)
EOSINOPHIL NFR BLD AUTO: 1.9 % (ref 0.3–6.2)
ERYTHROCYTE [DISTWIDTH] IN BLOOD BY AUTOMATED COUNT: 13.3 % (ref 12.3–15.4)
GLUCOSE SERPL-MCNC: 105 MG/DL (ref 65–99)
HCT VFR BLD AUTO: 44.7 % (ref 34–46.6)
HGB BLD-MCNC: 14 G/DL (ref 12–15.9)
IMM GRANULOCYTES # BLD AUTO: 0.03 10*3/MM3 (ref 0–0.05)
IMM GRANULOCYTES NFR BLD AUTO: 0.3 % (ref 0–0.5)
LYMPHOCYTES # BLD AUTO: 2.7 10*3/MM3 (ref 0.7–3.1)
LYMPHOCYTES NFR BLD AUTO: 30.2 % (ref 19.6–45.3)
MCH RBC QN AUTO: 27.1 PG (ref 26.6–33)
MCHC RBC AUTO-ENTMCNC: 31.3 G/DL (ref 31.5–35.7)
MCV RBC AUTO: 86.6 FL (ref 79–97)
MONOCYTES # BLD AUTO: 0.65 10*3/MM3 (ref 0.1–0.9)
MONOCYTES NFR BLD AUTO: 7.3 % (ref 5–12)
NEUTROPHILS NFR BLD AUTO: 5.37 10*3/MM3 (ref 1.7–7)
NEUTROPHILS NFR BLD AUTO: 60 % (ref 42.7–76)
NRBC BLD AUTO-RTO: 0 /100 WBC (ref 0–0.2)
PHOSPHATE SERPL-MCNC: 3.2 MG/DL (ref 2.5–4.5)
PLATELET # BLD AUTO: 220 10*3/MM3 (ref 140–450)
PMV BLD AUTO: 10.5 FL (ref 6–12)
POTASSIUM SERPL-SCNC: 4.4 MMOL/L (ref 3.5–5.2)
RBC # BLD AUTO: 5.16 10*6/MM3 (ref 3.77–5.28)
SODIUM SERPL-SCNC: 138 MMOL/L (ref 136–145)
WBC NRBC COR # BLD: 8.95 10*3/MM3 (ref 3.4–10.8)

## 2023-10-30 PROCEDURE — 80069 RENAL FUNCTION PANEL: CPT

## 2023-10-30 PROCEDURE — 85025 COMPLETE CBC W/AUTO DIFF WBC: CPT

## 2023-10-30 PROCEDURE — 81001 URINALYSIS AUTO W/SCOPE: CPT

## 2023-10-30 PROCEDURE — 36415 COLL VENOUS BLD VENIPUNCTURE: CPT

## 2023-10-30 PROCEDURE — 84156 ASSAY OF PROTEIN URINE: CPT

## 2023-10-30 PROCEDURE — 82570 ASSAY OF URINE CREATININE: CPT

## 2023-10-31 LAB
BACTERIA UR QL AUTO: ABNORMAL /HPF
BILIRUB UR QL STRIP: NEGATIVE
CLARITY UR: CLEAR
COLOR UR: YELLOW
CREAT UR-MCNC: 101.9 MG/DL
GLUCOSE UR STRIP-MCNC: NEGATIVE MG/DL
HGB UR QL STRIP.AUTO: NEGATIVE
HYALINE CASTS UR QL AUTO: ABNORMAL /LPF
KETONES UR QL STRIP: NEGATIVE
LEUKOCYTE ESTERASE UR QL STRIP.AUTO: NEGATIVE
NITRITE UR QL STRIP: NEGATIVE
PH UR STRIP.AUTO: 6 [PH] (ref 5–8)
PROT ?TM UR-MCNC: 59.3 MG/DL
PROT UR QL STRIP: ABNORMAL
RBC # UR STRIP: ABNORMAL /HPF
REF LAB TEST METHOD: ABNORMAL
SP GR UR STRIP: 1.02 (ref 1–1.03)
SQUAMOUS #/AREA URNS HPF: ABNORMAL /HPF
UROBILINOGEN UR QL STRIP: ABNORMAL
WBC # UR STRIP: ABNORMAL /HPF

## 2024-01-23 ENCOUNTER — OFFICE VISIT (OUTPATIENT)
Dept: FAMILY MEDICINE CLINIC | Facility: CLINIC | Age: 39
End: 2024-01-23
Payer: COMMERCIAL

## 2024-01-23 ENCOUNTER — LAB (OUTPATIENT)
Dept: LAB | Facility: HOSPITAL | Age: 39
End: 2024-01-23
Payer: COMMERCIAL

## 2024-01-23 ENCOUNTER — TRANSCRIBE ORDERS (OUTPATIENT)
Dept: ADMINISTRATIVE | Facility: HOSPITAL | Age: 39
End: 2024-01-23
Payer: COMMERCIAL

## 2024-01-23 VITALS
HEIGHT: 68 IN | TEMPERATURE: 97.1 F | HEART RATE: 105 BPM | BODY MASS INDEX: 38.65 KG/M2 | WEIGHT: 255 LBS | OXYGEN SATURATION: 96 % | SYSTOLIC BLOOD PRESSURE: 120 MMHG | DIASTOLIC BLOOD PRESSURE: 80 MMHG | RESPIRATION RATE: 16 BRPM

## 2024-01-23 DIAGNOSIS — Z79.899 OTHER LONG TERM (CURRENT) DRUG THERAPY: ICD-10-CM

## 2024-01-23 DIAGNOSIS — E55.9 VITAMIN D DEFICIENCY: Primary | ICD-10-CM

## 2024-01-23 DIAGNOSIS — E55.9 VITAMIN D DEFICIENCY: ICD-10-CM

## 2024-01-23 DIAGNOSIS — T14.8XXA EXCORIATION: ICD-10-CM

## 2024-01-23 DIAGNOSIS — Z23 NEED FOR INFLUENZA VACCINATION: ICD-10-CM

## 2024-01-23 DIAGNOSIS — I10 ESSENTIAL HYPERTENSION: Primary | Chronic | ICD-10-CM

## 2024-01-23 DIAGNOSIS — M32.9 SYSTEMIC LUPUS ERYTHEMATOSUS, UNSPECIFIED SLE TYPE, UNSPECIFIED ORGAN INVOLVEMENT STATUS: ICD-10-CM

## 2024-01-23 DIAGNOSIS — K21.9 GASTROESOPHAGEAL REFLUX DISEASE, UNSPECIFIED WHETHER ESOPHAGITIS PRESENT: Chronic | ICD-10-CM

## 2024-01-23 DIAGNOSIS — I82.493: ICD-10-CM

## 2024-01-23 LAB
BASOPHILS # BLD AUTO: 0.02 10*3/MM3 (ref 0–0.2)
BASOPHILS NFR BLD AUTO: 0.3 % (ref 0–1.5)
DEPRECATED RDW RBC AUTO: 43.2 FL (ref 37–54)
EOSINOPHIL # BLD AUTO: 0.11 10*3/MM3 (ref 0–0.4)
EOSINOPHIL NFR BLD AUTO: 1.4 % (ref 0.3–6.2)
ERYTHROCYTE [DISTWIDTH] IN BLOOD BY AUTOMATED COUNT: 14 % (ref 12.3–15.4)
HCT VFR BLD AUTO: 43.2 % (ref 34–46.6)
HGB BLD-MCNC: 13.7 G/DL (ref 12–15.9)
IMM GRANULOCYTES # BLD AUTO: 0.02 10*3/MM3 (ref 0–0.05)
IMM GRANULOCYTES NFR BLD AUTO: 0.3 % (ref 0–0.5)
LYMPHOCYTES # BLD AUTO: 2.18 10*3/MM3 (ref 0.7–3.1)
LYMPHOCYTES NFR BLD AUTO: 27.9 % (ref 19.6–45.3)
MCH RBC QN AUTO: 27.3 PG (ref 26.6–33)
MCHC RBC AUTO-ENTMCNC: 31.7 G/DL (ref 31.5–35.7)
MCV RBC AUTO: 86.2 FL (ref 79–97)
MONOCYTES # BLD AUTO: 0.51 10*3/MM3 (ref 0.1–0.9)
MONOCYTES NFR BLD AUTO: 6.5 % (ref 5–12)
NEUTROPHILS NFR BLD AUTO: 4.97 10*3/MM3 (ref 1.7–7)
NEUTROPHILS NFR BLD AUTO: 63.6 % (ref 42.7–76)
NRBC BLD AUTO-RTO: 0 /100 WBC (ref 0–0.2)
PLATELET # BLD AUTO: 232 10*3/MM3 (ref 140–450)
PMV BLD AUTO: 10.4 FL (ref 6–12)
RBC # BLD AUTO: 5.01 10*6/MM3 (ref 3.77–5.28)
WBC NRBC COR # BLD AUTO: 7.81 10*3/MM3 (ref 3.4–10.8)

## 2024-01-23 PROCEDURE — 86255 FLUORESCENT ANTIBODY SCREEN: CPT

## 2024-01-23 PROCEDURE — 82306 VITAMIN D 25 HYDROXY: CPT

## 2024-01-23 PROCEDURE — 86160 COMPLEMENT ANTIGEN: CPT

## 2024-01-23 PROCEDURE — 85025 COMPLETE CBC W/AUTO DIFF WBC: CPT

## 2024-01-23 PROCEDURE — 36415 COLL VENOUS BLD VENIPUNCTURE: CPT

## 2024-01-23 PROCEDURE — 80053 COMPREHEN METABOLIC PANEL: CPT

## 2024-01-23 RX ORDER — DULOXETIN HYDROCHLORIDE 60 MG/1
60 CAPSULE, DELAYED RELEASE ORAL DAILY
Start: 2024-01-23

## 2024-01-23 RX ORDER — LOSARTAN POTASSIUM 50 MG/1
50 TABLET ORAL DAILY
Qty: 90 TABLET | Refills: 1 | Status: SHIPPED | OUTPATIENT
Start: 2024-01-23

## 2024-01-23 RX ORDER — PANTOPRAZOLE SODIUM 40 MG/1
40 TABLET, DELAYED RELEASE ORAL DAILY
Qty: 90 TABLET | Refills: 1 | Status: SHIPPED | OUTPATIENT
Start: 2024-01-23

## 2024-01-23 NOTE — PROGRESS NOTES
Milind Benítez is a 38 y.o. female.     Chief Complaint   Patient presents with    Hypertension       History of Present Illness   She presents for follow up of essential hypertension. She reports good blood pressure control. She c/o a cut on the fourth finger of her right hand that happened a few days ago. She states a bowl on her porch broke and cut her finger.   Hypertension  This is a recurrent problem. The current episode started more than 1 year ago. The problem has been stable since onset. Associated symptoms include headaches and malaise/fatigue. Pertinent negatives include no anxiety, blurred vision, chest pain, orthopnea, palpitations, peripheral edema or shortness of breath. Agents associated with hypertension include amphetamines and NSAIDs. Compliance problems include diet and exercise.         The following portions of the patient's history were reviewed and updated as appropriate: allergies, current medications, past family history, past medical history, past social history, past surgical history and problem list.    Review of Systems   Constitutional:  Positive for malaise/fatigue. Negative for fatigue.   Eyes:  Negative for blurred vision and visual disturbance.   Respiratory:  Negative for cough, chest tightness and shortness of breath.    Cardiovascular:  Negative for chest pain, palpitations and orthopnea.   Gastrointestinal:  Negative for abdominal pain, blood in stool, constipation, diarrhea, nausea and vomiting.   Genitourinary:  Negative for dysuria and hematuria.   Allergic/Immunologic: Positive for environmental allergies.   Neurological:  Positive for headaches.   Hematological:  Negative for adenopathy.   Psychiatric/Behavioral:  Negative for agitation and suicidal ideas. The patient is not nervous/anxious.        Objective     /80 (BP Location: Right arm, Patient Position: Sitting, Cuff Size: Large Adult)   Pulse 105   Temp 97.1 °F (36.2 °C) (Temporal)   Resp 16   Ht  "172.7 cm (67.99\")   Wt 116 kg (255 lb)   SpO2 96%   BMI 38.78 kg/m²     Physical Exam  Vitals reviewed.   Constitutional:       General: She is not in acute distress.     Appearance: Normal appearance. She is well-developed. She is not diaphoretic.   HENT:      Head: Normocephalic and atraumatic.   Cardiovascular:      Rate and Rhythm: Normal rate and regular rhythm.      Heart sounds: Normal heart sounds, S1 normal and S2 normal. No murmur heard.     No friction rub. No gallop.   Pulmonary:      Effort: Pulmonary effort is normal. No respiratory distress.      Breath sounds: Normal breath sounds.   Abdominal:      General: Bowel sounds are normal. There is no distension.      Palpations: Abdomen is soft.      Tenderness: There is no abdominal tenderness.   Skin:     General: Skin is warm and dry.             Comments: Small excoriation fourth finger right hand.   Neurological:      Mental Status: She is alert and oriented to person, place, and time.   Psychiatric:         Behavior: Behavior normal.         Thought Content: Thought content normal.         Judgment: Judgment normal.         Current Outpatient Medications   Medication Sig Dispense Refill    celecoxib (CeleBREX) 200 MG capsule Take 1 capsule by mouth Daily As Needed.      DULoxetine (CYMBALTA) 60 MG capsule Take 1 capsule by mouth Daily.      hydroxychloroquine (PLAQUENIL) 200 MG tablet Take 1 tablet by mouth 2 (Two) Times a Day.      lamoTRIgine (LaMICtal) 100 MG tablet Take 1 tablet by mouth 2 (Two) Times a Day.      losartan (Cozaar) 50 MG tablet Take 1 tablet by mouth Daily. 90 tablet 1    melatonin 5 MG tablet tablet Take 1 tablet by mouth Every Night.      pantoprazole (PROTONIX) 40 MG EC tablet Take 1 tablet by mouth Daily. 90 tablet 1    polyethylene glycol (MiraLax) 17 g packet Mix 17 grams of powder in 4 to 8 ounces of liquid and take by mouth Daily. 30 each 3    SUMAtriptan (IMITREX) 100 MG tablet Take one tablet at onset of headache. May " repeat dose one time in 2 hours if headache not relieved. 6 tablet 0    tiZANidine (ZANAFLEX) 2 MG tablet Take 2 tablets by mouth every night at bedtime.       No current facility-administered medications for this visit.            Assessment & Plan     Problem List Items Addressed This Visit       Essential hypertension - Primary    Relevant Medications    losartan (Cozaar) 50 MG tablet    DULoxetine (CYMBALTA) 60 MG capsule    Other Relevant Orders    Lipid Panel    Gastroesophageal reflux disease    Relevant Medications    pantoprazole (PROTONIX) 40 MG EC tablet     Other Visit Diagnoses       Excoriation        Relevant Orders    Tdap Vaccine Greater Than or Equal To 6yo IM              ICD-10-CM ICD-9-CM   1. Essential hypertension  I10 401.9   2. Excoriation  T14.8XXA 919.8   3. Gastroesophageal reflux disease, unspecified whether esophagitis present  K21.9 530.81       Plan: Continue current meds. Get labs today. Flu vaccine ordered for her consent. Tdap ordered for excoriation. Follow up in 6 months and as needed.     @Body mass index is 38.78 kg/m².              Understands disease processes and need for medications.  Understands reasons for urgent and emergent care.  Patient (& family) verbalized agreement for treatment plan.   Emotional support and active listening provided.  Patient provided time to verbalize feelings.                  This document has been electronically signed by DANIEL Restrepo   January 23, 2024 13:38 EST

## 2024-01-23 NOTE — PROGRESS NOTES
Venipuncture Blood Specimen Collection  Venipuncture performed in right arm by Ivana Kumar MA with good hemostasis. Patient tolerated the procedure well without complications.   01/23/24   Ivana Kumar MA    Immunization  Immunization performed in right and left by Ivana Kumar MA. Patient tolerated the procedure well without complications.  01/23/24   Ivana Kumar MA

## 2024-01-24 ENCOUNTER — OFFICE VISIT (OUTPATIENT)
Dept: FAMILY MEDICINE CLINIC | Facility: CLINIC | Age: 39
End: 2024-01-24
Payer: COMMERCIAL

## 2024-01-24 VITALS
OXYGEN SATURATION: 97 % | HEIGHT: 68 IN | BODY MASS INDEX: 38.89 KG/M2 | SYSTOLIC BLOOD PRESSURE: 124 MMHG | DIASTOLIC BLOOD PRESSURE: 86 MMHG | WEIGHT: 256.6 LBS | TEMPERATURE: 97.9 F | HEART RATE: 93 BPM

## 2024-01-24 DIAGNOSIS — H66.002 NON-RECURRENT ACUTE SUPPURATIVE OTITIS MEDIA OF LEFT EAR WITHOUT SPONTANEOUS RUPTURE OF TYMPANIC MEMBRANE: Primary | ICD-10-CM

## 2024-01-24 LAB
25(OH)D3 SERPL-MCNC: 44.6 NG/ML (ref 30–100)
ALBUMIN SERPL-MCNC: 4.8 G/DL (ref 3.5–5.2)
ALBUMIN/GLOB SERPL: 1.6 G/DL
ALP SERPL-CCNC: 64 U/L (ref 39–117)
ALT SERPL W P-5'-P-CCNC: 15 U/L (ref 1–33)
ANION GAP SERPL CALCULATED.3IONS-SCNC: 10.6 MMOL/L (ref 5–15)
AST SERPL-CCNC: 18 U/L (ref 1–32)
BACTERIA UR QL AUTO: ABNORMAL /HPF
BILIRUB SERPL-MCNC: 0.3 MG/DL (ref 0–1.2)
BILIRUB UR QL STRIP: NEGATIVE
BILIRUB UR QL STRIP: NEGATIVE
BUN SERPL-MCNC: 14 MG/DL (ref 6–20)
BUN/CREAT SERPL: 20.6 (ref 7–25)
C3 SERPL-MCNC: 137 MG/DL (ref 82–167)
C4 SERPL-MCNC: 27 MG/DL (ref 14–44)
CALCIUM SPEC-SCNC: 9.6 MG/DL (ref 8.6–10.5)
CHLORIDE SERPL-SCNC: 105 MMOL/L (ref 98–107)
CLARITY UR: ABNORMAL
CLARITY UR: ABNORMAL
CO2 SERPL-SCNC: 20.4 MMOL/L (ref 22–29)
COLOR UR: YELLOW
COLOR UR: YELLOW
CREAT SERPL-MCNC: 0.68 MG/DL (ref 0.57–1)
EGFRCR SERPLBLD CKD-EPI 2021: 113.8 ML/MIN/1.73
GLOBULIN UR ELPH-MCNC: 3 GM/DL
GLUCOSE SERPL-MCNC: 93 MG/DL (ref 65–99)
GLUCOSE UR STRIP-MCNC: NEGATIVE MG/DL
GLUCOSE UR STRIP-MCNC: NEGATIVE MG/DL
HGB UR QL STRIP.AUTO: NEGATIVE
HGB UR QL STRIP.AUTO: NEGATIVE
HOLD SPECIMEN: NORMAL
HYALINE CASTS UR QL AUTO: ABNORMAL /LPF
KETONES UR QL STRIP: NEGATIVE
KETONES UR QL STRIP: NEGATIVE
LEUKOCYTE ESTERASE UR QL STRIP.AUTO: NEGATIVE
LEUKOCYTE ESTERASE UR QL STRIP.AUTO: NEGATIVE
NITRITE UR QL STRIP: NEGATIVE
NITRITE UR QL STRIP: NEGATIVE
PH UR STRIP.AUTO: 6 [PH] (ref 5–8)
PH UR STRIP.AUTO: 6 [PH] (ref 5–8)
POTASSIUM SERPL-SCNC: 4.6 MMOL/L (ref 3.5–5.2)
PROT SERPL-MCNC: 7.8 G/DL (ref 6–8.5)
PROT UR QL STRIP: ABNORMAL
PROT UR QL STRIP: ABNORMAL
RBC # UR STRIP: ABNORMAL /HPF
REF LAB TEST METHOD: ABNORMAL
SODIUM SERPL-SCNC: 136 MMOL/L (ref 136–145)
SP GR UR STRIP: 1.02 (ref 1–1.03)
SP GR UR STRIP: 1.02 (ref 1–1.03)
SQUAMOUS #/AREA URNS HPF: ABNORMAL /HPF
UROBILINOGEN UR QL STRIP: ABNORMAL
UROBILINOGEN UR QL STRIP: ABNORMAL
WBC # UR STRIP: ABNORMAL /HPF

## 2024-01-24 RX ORDER — AMOXICILLIN AND CLAVULANATE POTASSIUM 875; 125 MG/1; MG/1
1 TABLET, FILM COATED ORAL 2 TIMES DAILY
Qty: 20 TABLET | Refills: 0 | Status: SHIPPED | OUTPATIENT
Start: 2024-01-24

## 2024-01-24 NOTE — PROGRESS NOTES
Patient Name: Livia Benítez Today's Date: 2024   Patient MRN / CSN: [id number] / [id number] Date of Encounter: 2024   Patient Age / : 38 y.o. / 1985 Encounter Provider: DANIEL Cordova   Referring Physician: No ref. provider found          Livia is a 38 y.o. female who is being seen today for Earache (Started yesterday in left ear  )      Earache   There is pain in the left ear. This is a new problem. The current episode started yesterday. The problem occurs constantly. The problem has been worse. There has been no fever. The patient is experiencing no pain. She has tried nothing for the symptoms. The treatment provided no relief.       Allergies include:Dilantin [phenytoin]  Current Outpatient Medications   Medication Sig Dispense Refill    celecoxib (CeleBREX) 200 MG capsule Take 1 capsule by mouth Daily As Needed.      DULoxetine (CYMBALTA) 60 MG capsule Take 1 capsule by mouth Daily.      hydroxychloroquine (PLAQUENIL) 200 MG tablet Take 1 tablet by mouth 2 (Two) Times a Day.      lamoTRIgine (LaMICtal) 100 MG tablet Take 1 tablet by mouth 2 (Two) Times a Day.      losartan (Cozaar) 50 MG tablet Take 1 tablet by mouth Daily. 90 tablet 1    melatonin 5 MG tablet tablet Take 1 tablet by mouth Every Night.      pantoprazole (PROTONIX) 40 MG EC tablet Take 1 tablet by mouth Daily. 90 tablet 1    polyethylene glycol (MiraLax) 17 g packet Mix 17 grams of powder in 4 to 8 ounces of liquid and take by mouth Daily. 30 each 3    SUMAtriptan (IMITREX) 100 MG tablet Take one tablet at onset of headache. May repeat dose one time in 2 hours if headache not relieved. 6 tablet 0    tiZANidine (ZANAFLEX) 2 MG tablet Take 2 tablets by mouth every night at bedtime.      amoxicillin-clavulanate (AUGMENTIN) 875-125 MG per tablet Take 1 tablet by mouth 2 (Two) Times a Day. 20 tablet 0     No current facility-administered medications for this visit.     Past Medical History:   Diagnosis Date    Abnormal Pap  smear of cervix     Arthritis     Autoimmune disorder     Blood clots in brain     2003  ALSO ONE IN LEG IN .    Dental bridge present     DVT, lower extremity     Oxford filter in place     SINCE     History of transfusion 2003    History of unsteady gait     SINCE STROKE    HPV (human papilloma virus) infection     Hx of trichomoniasis 2016    Hypertension 2003    CHTN    Kidney stone     ,     Lupus 2002    Migraine     Seizures     LAST ONE     Stroke     2003    Urinary tract infection      Family History   Problem Relation Age of Onset    Diabetes Father     Hypertension Father     Thyroid disease Mother     Heart murmur Mother     Hypertension Brother     Breast cancer Maternal Aunt     Kidney disease Maternal Grandmother      Past Surgical History:   Procedure Laterality Date    ABDOMINAL SURGERY       SECTION WITH TUBAL Bilateral 2021    Procedure:  SECTION PRIMARY WITH TUBAL;  Surgeon: Claudine Taylor DO;  Location: [facility] LABOR DELIVERY;  Service: Obstetrics/Gynecology;  Laterality: Bilateral;    EXTRACORPOREAL SHOCK WAVE LITHOTRIPSY (ESWL) Left 2017    Procedure: EXTRACORPOREAL SHOCKWAVE LITHOTRIPSY, FLEXIBLE URETEROSCOPY;  Surgeon: Driss Wharton MD;  Location: [facility] OR;  Service:     GALLBLADDER SURGERY  2008    HAND SURGERY Right     KIDNEY STONE SURGERY      ,     MOUTH SURGERY  2007    wisdom teeth    OTHER SURGICAL HISTORY      IVC filter     Social History     Substance and Sexual Activity   Alcohol Use Not Currently     Social History     Tobacco Use   Smoking Status Never   Smokeless Tobacco Never     Social History     Substance and Sexual Activity   Drug Use No     Review of Systems   Constitutional: Negative.    HENT:  Positive for ear pain.    Eyes: Negative.    Respiratory: Negative.     Cardiovascular: Negative.    Gastrointestinal: Negative.    Endocrine: Negative.    Genitourinary: Negative.    Musculoskeletal:  "Negative.    Skin: Negative.    Allergic/Immunologic: Negative.    Neurological: Negative.    Hematological: Negative.    Psychiatric/Behavioral: Negative.          Depression Assessment Review:  PHQ-9 Total Score:    Vital Signs & Measurements Taken This Encounter  /86 (BP Location: Left arm, Patient Position: Sitting, Cuff Size: Large Adult)   Pulse 93   Temp 97.9 °F (36.6 °C) (Temporal)   Ht 172.7 cm (67.99\")   Wt 116 kg (256 lb 9.6 oz)   SpO2 97%   BMI 39.03 kg/m²    SpO2 Percentage    01/24/24 1323   SpO2: 97%        Physical Exam  Constitutional:       Appearance: Normal appearance.   HENT:      Right Ear: Tympanic membrane, ear canal and external ear normal.      Left Ear: Tympanic membrane is erythematous and bulging.      Mouth/Throat:      Mouth: Mucous membranes are moist. Mucous membranes are dry.   Eyes:      Pupils: Pupils are equal, round, and reactive to light.   Cardiovascular:      Rate and Rhythm: Normal rate and regular rhythm.      Pulses: Normal pulses.      Heart sounds: Normal heart sounds.   Pulmonary:      Effort: Pulmonary effort is normal.      Breath sounds: Normal breath sounds.   Abdominal:      Palpations: Abdomen is soft.   Musculoskeletal:         General: Normal range of motion.   Skin:     General: Skin is warm.   Neurological:      General: No focal deficit present.      Mental Status: She is alert and oriented to person, place, and time.   Psychiatric:         Mood and Affect: Mood normal.         Behavior: Behavior normal.          Fall Risk Assessment:  Fall Risk Assessment was completed, and patient is at low risk for falls.    Assessment & Plan  Patient Active Problem List   Diagnosis    Essential hypertension    Migraine    Seizures    Lupus    Disseminated lupus erythematosus    Atypical chest pain    Non-rheumatic mitral regurgitation    Morbid obesity with BMI of 40.0-44.9, adult    Previous deep vein thrombosis (DVT) affecting pregnancy in third trimester    " Hypertension affecting pregnancy    Severe pre-eclampsia    Postpartum care following  delivery    Gastroesophageal reflux disease       ICD-10-CM ICD-9-CM   1. Non-recurrent acute suppurative otitis media of left ear without spontaneous rupture of tympanic membrane  H66.002 382.00     No orders of the defined types were placed in this encounter.      Meds Ordered During Visit:  New Medications Ordered This Visit   Medications    amoxicillin-clavulanate (AUGMENTIN) 875-125 MG per tablet     Sig: Take 1 tablet by mouth 2 (Two) Times a Day.     Dispense:  20 tablet     Refill:  0     -- She presents today with complaints left ear pain.  She does have a bulging tannic membrane as well as erythema present in her left eardrum.  I will start her on Augmentin for this otitis media.      Follow up on file with DANIEL Restrepo           This document has been electronically signed by DANIEL Cordova  2024 15:35 EST    DANIEL Cordova  [address] [place] [address] [phone] (office)    Part of this note may be an electronic transcription/translation of spoken language to printed text using the Dragon Dictation System.

## 2024-01-25 LAB — DSDNA AB SER QL CLIF: NEGATIVE

## 2024-01-30 NOTE — PROGRESS NOTES
Letter sent, reminder set.    Milind Benítez is a 34 y.o. female.     Chief Complaint   Patient presents with   • Establish Care   • URI   • Cough       She presents seeking a new primary care provider. She states the last primary care doctor she had was Dr. Paiz 5-10 years ago. She states she was treated for Lupus. She states she saw a specialist in [place] for Lupus. She states in the fall of 2002 she started swelling in her joints so she went to the doctor where they did blood work and diagnosed her with Lupus around [place]. She states she was chatting with a friend on the computer in January and started having seizures. She she was taken to [facility] in [place] and then flown to [facility] in [place]. She states she was in a medicine induced coma for about 3 weeks. She states she assumes she had the stroke around that time but she doesn't have memory of that time. She states the blood clots in her brain happened while at [facility] ER. She states the doctor gave her a pill that she thinks caused the blood clots. She states they put a green filter in to stop the blood clots. She states she only has a couple memories from that time. She states she only knows what her mother has told her. She denies seizures since then. She states she had to relearn how to walk and use her left side. She states she is still treated for seizures by Justen Cornelius in neurology in [place]. She states she takes aptiom for seizures. She states she sees the neurologist for headaches as well. She states she takes nortriptyline for headaches.      She also presents for follow up of Lupus. She states she sees rheumatology Iwona Ring in [place]. She states the Lupus has been in remission for a few years. She states she is still taking plaquenil every day. She sees the eye doctor yearly in the fall to monitor on the plaquenil.     She also presents for follow up of kidney disease. She states the Lupus affected her kidneys so she sees nephrologist  Henrry Hess. She states he does blood work to monitor her kidneys.     She also presents for follow up of existing diagnosis of kidney stones. She states she sees Dr. Wharton in urology. She states she has not had a kidney stone in a year or so.     She also presents with c/o a cold. She states her head and eyes were itching. She states she took some over the counter benadryl allergy medicine. She c/o runny nose, sore throat, and earache. She c/o a little dry cough. She denies fever. She states she has felt weak and horrible the past couple weeks.    She presents with c/o chest pain for the past couple weeks. Described as aching like someone sitting on her chest. She states it stays for a few minutes and goes away.              The following portions of the patient's history were reviewed and updated as appropriate: allergies, current medications, past family history, past medical history, past social history, past surgical history and problem list.    Review of Systems   Constitutional: Negative for fatigue, fever and unexpected weight change.   HENT: Positive for ear pain, rhinorrhea and sore throat.    Eyes: Positive for pain and itching. Negative for discharge and visual disturbance.   Respiratory: Positive for cough. Negative for chest tightness, shortness of breath and wheezing.    Cardiovascular: Positive for chest pain. Negative for palpitations and leg swelling.   Gastrointestinal: Positive for nausea. Negative for abdominal pain, blood in stool, constipation, diarrhea and vomiting.   Endocrine: Positive for cold intolerance. Negative for heat intolerance.   Genitourinary: Positive for menstrual problem (irregular). Negative for dysuria and hematuria.   Musculoskeletal: Positive for arthralgias and myalgias. Negative for joint swelling.   Skin: Negative for color change.   Allergic/Immunologic: Positive for environmental allergies.   Neurological: Positive for weakness and headaches. Negative for  "seizures.   Hematological: Negative for adenopathy.   Psychiatric/Behavioral: Positive for sleep disturbance. Negative for suicidal ideas. The patient is not nervous/anxious.        Objective     /84 (BP Location: Left arm, Patient Position: Sitting, Cuff Size: Large Adult)   Pulse 84   Temp 97.8 °F (36.6 °C)   Ht 172.7 cm (68\")   Wt 113 kg (249 lb 3.2 oz)   SpO2 97%   BMI 37.89 kg/m²     Physical Exam   Constitutional: She is oriented to person, place, and time. She appears well-developed and well-nourished. No distress.   HENT:   Head: Normocephalic and atraumatic.   Right Ear: Hearing, external ear and ear canal normal. Tympanic membrane is erythematous.   Left Ear: Hearing, tympanic membrane, external ear and ear canal normal.   Nose: Nose normal.   Mouth/Throat: Uvula is midline, oropharynx is clear and moist and mucous membranes are normal. Tonsils are 1+ on the right. Tonsils are 1+ on the left.   Eyes: Conjunctivae, EOM and lids are normal. Pupils are equal, round, and reactive to light.   Neck: Trachea normal. Neck supple. No tracheal tenderness present. No tracheal deviation present. No thyromegaly present.   Cardiovascular: Normal rate, regular rhythm, S1 normal, S2 normal, normal heart sounds, intact distal pulses and normal pulses. Exam reveals no gallop and no friction rub.   No murmur heard.  Pulmonary/Chest: Effort normal and breath sounds normal. No respiratory distress.   Abdominal: Soft. Bowel sounds are normal. She exhibits no distension. There is no tenderness.   Musculoskeletal: She exhibits no edema.   Neurological: She is alert and oriented to person, place, and time. She has normal strength and normal reflexes. No cranial nerve deficit.   Skin: Skin is warm and dry. She is not diaphoretic.   Psychiatric: She has a normal mood and affect. Her behavior is normal. Judgment and thought content normal. Cognition and memory are impaired.       Assessment/Plan     Problem List Items " Addressed This Visit     None      Visit Diagnoses     Other acute nonsuppurative otitis media of right ear, recurrence not specified    -  Primary    Chest pain, unspecified type        Relevant Orders    ECG 12 Lead    Ambulatory Referral to Cardiology    Abnormal EKG        Relevant Orders    Ambulatory Referral to Cardiology        Plan: EKG done in office. EKG shows ST with incomplete left BBB. Unable to attain previous EKG for comparison. Refer to cardiology for workup of chest pain. Get records from neurology, nephrology, and rheumatology. She had blood work in [place] on May 3rd. Get reports and review. Augmentin ordered for otitis media.         Patient's Body mass index is 37.89 kg/m². BMI is above normal parameters. Recommendations include: educational material.   (Normal BMI:  18.5-24.9, OW 25-29.9, Obesity 30 or greater)        Understands disease processes and need for medications.  Understands reasons for urgent and emergent care.  Patient (& family) verbalized agreement for treatment plan.   Emotional support and active listening provided.  Patient provided time to verbalize feelings.               This document has been electronically signed by DANIEL Restrepo   May 10, 2019 12:48 PM

## 2024-02-04 ENCOUNTER — TRANSCRIBE ORDERS (OUTPATIENT)
Dept: ADMINISTRATIVE | Facility: HOSPITAL | Age: 39
End: 2024-02-04
Payer: COMMERCIAL

## 2024-02-04 ENCOUNTER — LAB (OUTPATIENT)
Dept: LAB | Facility: HOSPITAL | Age: 39
End: 2024-02-04
Payer: COMMERCIAL

## 2024-02-04 DIAGNOSIS — M32.14 LUPUS NEPHRITIS: Primary | ICD-10-CM

## 2024-02-04 DIAGNOSIS — M32.14 LUPUS NEPHRITIS: ICD-10-CM

## 2024-02-04 LAB
ALBUMIN SERPL-MCNC: 4.7 G/DL (ref 3.5–5.2)
ANION GAP SERPL CALCULATED.3IONS-SCNC: 10.9 MMOL/L (ref 5–15)
BASOPHILS # BLD AUTO: 0.04 10*3/MM3 (ref 0–0.2)
BASOPHILS NFR BLD AUTO: 0.6 % (ref 0–1.5)
BUN SERPL-MCNC: 14 MG/DL (ref 6–20)
BUN/CREAT SERPL: 26.4 (ref 7–25)
CALCIUM SPEC-SCNC: 9.4 MG/DL (ref 8.6–10.5)
CHLORIDE SERPL-SCNC: 106 MMOL/L (ref 98–107)
CO2 SERPL-SCNC: 22.1 MMOL/L (ref 22–29)
CREAT SERPL-MCNC: 0.53 MG/DL (ref 0.57–1)
DEPRECATED RDW RBC AUTO: 43.7 FL (ref 37–54)
EGFRCR SERPLBLD CKD-EPI 2021: 120.8 ML/MIN/1.73
EOSINOPHIL # BLD AUTO: 0.17 10*3/MM3 (ref 0–0.4)
EOSINOPHIL NFR BLD AUTO: 2.6 % (ref 0.3–6.2)
ERYTHROCYTE [DISTWIDTH] IN BLOOD BY AUTOMATED COUNT: 13.8 % (ref 12.3–15.4)
GLUCOSE SERPL-MCNC: 100 MG/DL (ref 65–99)
HCT VFR BLD AUTO: 42.9 % (ref 34–46.6)
HGB BLD-MCNC: 13.5 G/DL (ref 12–15.9)
IMM GRANULOCYTES # BLD AUTO: 0.03 10*3/MM3 (ref 0–0.05)
IMM GRANULOCYTES NFR BLD AUTO: 0.5 % (ref 0–0.5)
LYMPHOCYTES # BLD AUTO: 2.64 10*3/MM3 (ref 0.7–3.1)
LYMPHOCYTES NFR BLD AUTO: 39.8 % (ref 19.6–45.3)
MCH RBC QN AUTO: 27.3 PG (ref 26.6–33)
MCHC RBC AUTO-ENTMCNC: 31.5 G/DL (ref 31.5–35.7)
MCV RBC AUTO: 86.8 FL (ref 79–97)
MONOCYTES # BLD AUTO: 0.46 10*3/MM3 (ref 0.1–0.9)
MONOCYTES NFR BLD AUTO: 6.9 % (ref 5–12)
NEUTROPHILS NFR BLD AUTO: 3.29 10*3/MM3 (ref 1.7–7)
NEUTROPHILS NFR BLD AUTO: 49.6 % (ref 42.7–76)
NRBC BLD AUTO-RTO: 0 /100 WBC (ref 0–0.2)
PHOSPHATE SERPL-MCNC: 2.8 MG/DL (ref 2.5–4.5)
PLATELET # BLD AUTO: 236 10*3/MM3 (ref 140–450)
PMV BLD AUTO: 10.3 FL (ref 6–12)
POTASSIUM SERPL-SCNC: 4.2 MMOL/L (ref 3.5–5.2)
RBC # BLD AUTO: 4.94 10*6/MM3 (ref 3.77–5.28)
SODIUM SERPL-SCNC: 139 MMOL/L (ref 136–145)
WBC NRBC COR # BLD AUTO: 6.63 10*3/MM3 (ref 3.4–10.8)

## 2024-02-04 PROCEDURE — 84156 ASSAY OF PROTEIN URINE: CPT

## 2024-02-04 PROCEDURE — 80069 RENAL FUNCTION PANEL: CPT

## 2024-02-04 PROCEDURE — 82570 ASSAY OF URINE CREATININE: CPT

## 2024-02-04 PROCEDURE — 82306 VITAMIN D 25 HYDROXY: CPT

## 2024-02-04 PROCEDURE — 36415 COLL VENOUS BLD VENIPUNCTURE: CPT

## 2024-02-04 PROCEDURE — 81001 URINALYSIS AUTO W/SCOPE: CPT

## 2024-02-04 PROCEDURE — 85025 COMPLETE CBC W/AUTO DIFF WBC: CPT

## 2024-02-06 LAB
25(OH)D3+25(OH)D2 SERPL-MCNC: 49.1 NG/ML (ref 30–100)
APPEARANCE UR: CLEAR
BACTERIA #/AREA URNS HPF: NORMAL /[HPF]
BILIRUB UR QL STRIP: NEGATIVE
CASTS URNS QL MICRO: NORMAL /LPF
COLOR UR: YELLOW
EPI CELLS #/AREA URNS HPF: NORMAL /HPF (ref 0–10)
GLUCOSE UR QL STRIP: NEGATIVE
HGB UR QL STRIP: NEGATIVE
KETONES UR QL STRIP: NEGATIVE
LEUKOCYTE ESTERASE UR QL STRIP: NEGATIVE
NITRITE UR QL STRIP: NEGATIVE
PH UR STRIP: 5.5 [PH] (ref 5–7.5)
PROT UR QL STRIP: NORMAL
RBC #/AREA URNS HPF: NORMAL /HPF (ref 0–2)
SP GR UR STRIP: 1.02 (ref 1–1.03)
UROBILINOGEN UR STRIP-MCNC: 0.2 MG/DL (ref 0.2–1)
WBC #/AREA URNS HPF: NORMAL /HPF (ref 0–5)

## 2024-02-07 LAB
CREAT UR-MCNC: 54.8 MG/DL
PROT UR-MCNC: 25.6 MG/DL

## 2024-05-03 ENCOUNTER — TRANSCRIBE ORDERS (OUTPATIENT)
Dept: ADMINISTRATIVE | Facility: HOSPITAL | Age: 39
End: 2024-05-03
Payer: COMMERCIAL

## 2024-05-03 ENCOUNTER — LAB (OUTPATIENT)
Dept: LAB | Facility: HOSPITAL | Age: 39
End: 2024-05-03
Payer: COMMERCIAL

## 2024-05-03 DIAGNOSIS — M32.14 LUPUS GLOMERULONEPHRITIS: ICD-10-CM

## 2024-05-03 DIAGNOSIS — M32.14 LUPUS GLOMERULONEPHRITIS: Primary | ICD-10-CM

## 2024-05-03 LAB
ALBUMIN SERPL-MCNC: 4.5 G/DL (ref 3.5–5.2)
ANION GAP SERPL CALCULATED.3IONS-SCNC: 10.5 MMOL/L (ref 5–15)
BACTERIA UR QL AUTO: ABNORMAL /HPF
BASOPHILS # BLD AUTO: 0.02 10*3/MM3 (ref 0–0.2)
BASOPHILS NFR BLD AUTO: 0.4 % (ref 0–1.5)
BILIRUB UR QL STRIP: NEGATIVE
BUN SERPL-MCNC: 16 MG/DL (ref 6–20)
BUN/CREAT SERPL: 21.9 (ref 7–25)
CALCIUM SPEC-SCNC: 9.4 MG/DL (ref 8.6–10.5)
CHLORIDE SERPL-SCNC: 108 MMOL/L (ref 98–107)
CLARITY UR: ABNORMAL
CO2 SERPL-SCNC: 22.5 MMOL/L (ref 22–29)
COD CRY URNS QL: PRESENT /HPF
COLOR UR: YELLOW
CREAT SERPL-MCNC: 0.73 MG/DL (ref 0.57–1)
CREAT UR-MCNC: 232.7 MG/DL
DEPRECATED RDW RBC AUTO: 41.6 FL (ref 37–54)
EGFRCR SERPLBLD CKD-EPI 2021: 106.8 ML/MIN/1.73
EOSINOPHIL # BLD AUTO: 0.15 10*3/MM3 (ref 0–0.4)
EOSINOPHIL NFR BLD AUTO: 2.8 % (ref 0.3–6.2)
ERYTHROCYTE [DISTWIDTH] IN BLOOD BY AUTOMATED COUNT: 13.4 % (ref 12.3–15.4)
GLUCOSE SERPL-MCNC: 81 MG/DL (ref 65–99)
GLUCOSE UR STRIP-MCNC: NEGATIVE MG/DL
HCT VFR BLD AUTO: 41.5 % (ref 34–46.6)
HGB BLD-MCNC: 13.1 G/DL (ref 12–15.9)
HGB UR QL STRIP.AUTO: NEGATIVE
HYALINE CASTS UR QL AUTO: ABNORMAL /LPF
IMM GRANULOCYTES # BLD AUTO: 0.01 10*3/MM3 (ref 0–0.05)
IMM GRANULOCYTES NFR BLD AUTO: 0.2 % (ref 0–0.5)
KETONES UR QL STRIP: NEGATIVE
LEUKOCYTE ESTERASE UR QL STRIP.AUTO: NEGATIVE
LYMPHOCYTES # BLD AUTO: 2.18 10*3/MM3 (ref 0.7–3.1)
LYMPHOCYTES NFR BLD AUTO: 40.6 % (ref 19.6–45.3)
MCH RBC QN AUTO: 26.6 PG (ref 26.6–33)
MCHC RBC AUTO-ENTMCNC: 31.6 G/DL (ref 31.5–35.7)
MCV RBC AUTO: 84.2 FL (ref 79–97)
MONOCYTES # BLD AUTO: 0.46 10*3/MM3 (ref 0.1–0.9)
MONOCYTES NFR BLD AUTO: 8.6 % (ref 5–12)
NEUTROPHILS NFR BLD AUTO: 2.55 10*3/MM3 (ref 1.7–7)
NEUTROPHILS NFR BLD AUTO: 47.4 % (ref 42.7–76)
NITRITE UR QL STRIP: NEGATIVE
NRBC BLD AUTO-RTO: 0 /100 WBC (ref 0–0.2)
PH UR STRIP.AUTO: 5.5 [PH] (ref 5–8)
PHOSPHATE SERPL-MCNC: 3.2 MG/DL (ref 2.5–4.5)
PLATELET # BLD AUTO: 214 10*3/MM3 (ref 140–450)
PMV BLD AUTO: 11 FL (ref 6–12)
POTASSIUM SERPL-SCNC: 4.5 MMOL/L (ref 3.5–5.2)
PROT ?TM UR-MCNC: 35 MG/DL
PROT UR QL STRIP: ABNORMAL
RBC # BLD AUTO: 4.93 10*6/MM3 (ref 3.77–5.28)
RBC # UR STRIP: ABNORMAL /HPF
REF LAB TEST METHOD: ABNORMAL
SODIUM SERPL-SCNC: 141 MMOL/L (ref 136–145)
SP GR UR STRIP: >1.03 (ref 1–1.03)
SQUAMOUS #/AREA URNS HPF: ABNORMAL /HPF
UROBILINOGEN UR QL STRIP: ABNORMAL
WBC # UR STRIP: ABNORMAL /HPF
WBC NRBC COR # BLD AUTO: 5.37 10*3/MM3 (ref 3.4–10.8)

## 2024-05-03 PROCEDURE — 81001 URINALYSIS AUTO W/SCOPE: CPT

## 2024-05-03 PROCEDURE — 84156 ASSAY OF PROTEIN URINE: CPT

## 2024-05-03 PROCEDURE — 80069 RENAL FUNCTION PANEL: CPT

## 2024-05-03 PROCEDURE — 82570 ASSAY OF URINE CREATININE: CPT

## 2024-05-03 PROCEDURE — 85025 COMPLETE CBC W/AUTO DIFF WBC: CPT

## 2024-05-03 PROCEDURE — 36415 COLL VENOUS BLD VENIPUNCTURE: CPT

## 2024-05-08 ENCOUNTER — TRANSCRIBE ORDERS (OUTPATIENT)
Dept: ADMINISTRATIVE | Facility: HOSPITAL | Age: 39
End: 2024-05-08
Payer: COMMERCIAL

## 2024-05-08 DIAGNOSIS — Z12.31 VISIT FOR SCREENING MAMMOGRAM: Primary | ICD-10-CM

## 2024-05-29 ENCOUNTER — HOSPITAL ENCOUNTER (OUTPATIENT)
Dept: MAMMOGRAPHY | Facility: HOSPITAL | Age: 39
Discharge: HOME | End: 2024-05-29
Admitting: PHYSICIAN ASSISTANT
Payer: COMMERCIAL

## 2024-05-29 DIAGNOSIS — Z12.31 VISIT FOR SCREENING MAMMOGRAM: ICD-10-CM

## 2024-05-29 PROCEDURE — 77063 BREAST TOMOSYNTHESIS BI: CPT

## 2024-05-29 PROCEDURE — 77067 SCR MAMMO BI INCL CAD: CPT

## 2024-06-04 ENCOUNTER — TRANSCRIBE ORDERS (OUTPATIENT)
Age: 39
End: 2024-06-04
Payer: COMMERCIAL

## 2024-06-04 ENCOUNTER — LAB (OUTPATIENT)
Dept: LAB | Facility: HOSPITAL | Age: 39
End: 2024-06-04
Payer: COMMERCIAL

## 2024-06-04 DIAGNOSIS — Z79.899 OTHER LONG TERM (CURRENT) DRUG THERAPY: ICD-10-CM

## 2024-06-04 DIAGNOSIS — I82.90 ACUTE EMBOLISM AND THROMBOSIS OF VEIN: ICD-10-CM

## 2024-06-04 DIAGNOSIS — M32.9 SYSTEMIC LUPUS ERYTHEMATOSUS, UNSPECIFIED SLE TYPE, UNSPECIFIED ORGAN INVOLVEMENT STATUS: ICD-10-CM

## 2024-06-04 DIAGNOSIS — E55.9 VITAMIN D DEFICIENCY: ICD-10-CM

## 2024-06-04 DIAGNOSIS — E55.9 VITAMIN D DEFICIENCY: Primary | ICD-10-CM

## 2024-06-04 LAB
ALBUMIN SERPL-MCNC: 4.5 G/DL (ref 3.5–5.2)
ALBUMIN/GLOB SERPL: 1.6 G/DL
ALP SERPL-CCNC: 60 U/L (ref 39–117)
ALT SERPL W P-5'-P-CCNC: 10 U/L (ref 1–33)
ANION GAP SERPL CALCULATED.3IONS-SCNC: 18.4 MMOL/L (ref 5–15)
AST SERPL-CCNC: 17 U/L (ref 1–32)
BACTERIA UR QL AUTO: ABNORMAL /HPF
BASOPHILS # BLD AUTO: 0.02 10*3/MM3 (ref 0–0.2)
BASOPHILS NFR BLD AUTO: 0.4 % (ref 0–1.5)
BILIRUB SERPL-MCNC: 0.2 MG/DL (ref 0–1.2)
BILIRUB UR QL STRIP: NEGATIVE
BUN SERPL-MCNC: 13 MG/DL (ref 6–20)
BUN/CREAT SERPL: 22.4 (ref 7–25)
C3 SERPL-MCNC: 144 MG/DL (ref 82–167)
C4 SERPL-MCNC: 24 MG/DL (ref 14–44)
CALCIUM SPEC-SCNC: 9.6 MG/DL (ref 8.6–10.5)
CHLORIDE SERPL-SCNC: 104 MMOL/L (ref 98–107)
CLARITY UR: CLEAR
CO2 SERPL-SCNC: 16.6 MMOL/L (ref 22–29)
COLOR UR: YELLOW
CREAT SERPL-MCNC: 0.58 MG/DL (ref 0.57–1)
DEPRECATED RDW RBC AUTO: 41.6 FL (ref 37–54)
EGFRCR SERPLBLD CKD-EPI 2021: 117.5 ML/MIN/1.73
EOSINOPHIL # BLD AUTO: 0.19 10*3/MM3 (ref 0–0.4)
EOSINOPHIL NFR BLD AUTO: 3.4 % (ref 0.3–6.2)
ERYTHROCYTE [DISTWIDTH] IN BLOOD BY AUTOMATED COUNT: 13.8 % (ref 12.3–15.4)
GLOBULIN UR ELPH-MCNC: 2.9 GM/DL
GLUCOSE SERPL-MCNC: 90 MG/DL (ref 65–99)
GLUCOSE UR STRIP-MCNC: NEGATIVE MG/DL
HCT VFR BLD AUTO: 42.5 % (ref 34–46.6)
HGB BLD-MCNC: 13.5 G/DL (ref 12–15.9)
HGB UR QL STRIP.AUTO: NEGATIVE
HOLD SPECIMEN: NORMAL
HYALINE CASTS UR QL AUTO: ABNORMAL /LPF
IMM GRANULOCYTES # BLD AUTO: 0.01 10*3/MM3 (ref 0–0.05)
IMM GRANULOCYTES NFR BLD AUTO: 0.2 % (ref 0–0.5)
KETONES UR QL STRIP: NEGATIVE
LEUKOCYTE ESTERASE UR QL STRIP.AUTO: NEGATIVE
LYMPHOCYTES # BLD AUTO: 2 10*3/MM3 (ref 0.7–3.1)
LYMPHOCYTES NFR BLD AUTO: 35.3 % (ref 19.6–45.3)
MCH RBC QN AUTO: 26.2 PG (ref 26.6–33)
MCHC RBC AUTO-ENTMCNC: 31.8 G/DL (ref 31.5–35.7)
MCV RBC AUTO: 82.5 FL (ref 79–97)
MONOCYTES # BLD AUTO: 0.45 10*3/MM3 (ref 0.1–0.9)
MONOCYTES NFR BLD AUTO: 8 % (ref 5–12)
NEUTROPHILS NFR BLD AUTO: 2.99 10*3/MM3 (ref 1.7–7)
NEUTROPHILS NFR BLD AUTO: 52.7 % (ref 42.7–76)
NITRITE UR QL STRIP: NEGATIVE
NRBC BLD AUTO-RTO: 0 /100 WBC (ref 0–0.2)
PH UR STRIP.AUTO: 5.5 [PH] (ref 5–8)
PLATELET # BLD AUTO: 228 10*3/MM3 (ref 140–450)
PMV BLD AUTO: 10.6 FL (ref 6–12)
POTASSIUM SERPL-SCNC: 4.2 MMOL/L (ref 3.5–5.2)
PROT SERPL-MCNC: 7.4 G/DL (ref 6–8.5)
PROT UR QL STRIP: ABNORMAL
RBC # BLD AUTO: 5.15 10*6/MM3 (ref 3.77–5.28)
RBC # UR STRIP: ABNORMAL /HPF
REF LAB TEST METHOD: ABNORMAL
SODIUM SERPL-SCNC: 139 MMOL/L (ref 136–145)
SP GR UR STRIP: 1.02 (ref 1–1.03)
SQUAMOUS #/AREA URNS HPF: ABNORMAL /HPF
UROBILINOGEN UR QL STRIP: ABNORMAL
WBC # UR STRIP: ABNORMAL /HPF
WBC NRBC COR # BLD AUTO: 5.66 10*3/MM3 (ref 3.4–10.8)

## 2024-06-04 PROCEDURE — 85025 COMPLETE CBC W/AUTO DIFF WBC: CPT

## 2024-06-04 PROCEDURE — 86160 COMPLEMENT ANTIGEN: CPT

## 2024-06-04 PROCEDURE — 80053 COMPREHEN METABOLIC PANEL: CPT

## 2024-06-04 PROCEDURE — 36415 COLL VENOUS BLD VENIPUNCTURE: CPT

## 2024-06-04 PROCEDURE — 86255 FLUORESCENT ANTIBODY SCREEN: CPT

## 2024-06-04 PROCEDURE — 81001 URINALYSIS AUTO W/SCOPE: CPT

## 2024-06-07 LAB
DSDNA AB SER QL CLIF: POSITIVE
DSDNA AB TITR SER CLIF: ABNORMAL {TITER}

## 2024-06-09 PROBLEM — R10.13 DYSPEPSIA: Status: ACTIVE | Noted: 2024-06-09

## 2024-06-09 PROBLEM — N05.9 NEPHRITIS: Status: ACTIVE | Noted: 2024-06-09

## 2024-06-09 PROBLEM — R42 EPISODIC LIGHTHEADEDNESS: Status: ACTIVE | Noted: 2024-06-09

## 2024-06-09 PROBLEM — M54.50 LUMBAR SPINE PAIN: Status: ACTIVE | Noted: 2024-06-09

## 2024-06-11 ENCOUNTER — OFFICE VISIT (OUTPATIENT)
Age: 39
End: 2024-06-11
Payer: COMMERCIAL

## 2024-06-11 VITALS
WEIGHT: 254.4 LBS | SYSTOLIC BLOOD PRESSURE: 124 MMHG | TEMPERATURE: 98.3 F | HEART RATE: 83 BPM | HEIGHT: 68 IN | DIASTOLIC BLOOD PRESSURE: 68 MMHG | BODY MASS INDEX: 38.55 KG/M2

## 2024-06-11 DIAGNOSIS — M32.9 SYSTEMIC LUPUS ERYTHEMATOSUS, UNSPECIFIED SLE TYPE, UNSPECIFIED ORGAN INVOLVEMENT STATUS: Primary | ICD-10-CM

## 2024-06-11 DIAGNOSIS — E55.9 VITAMIN D DEFICIENCY: ICD-10-CM

## 2024-06-11 DIAGNOSIS — Z79.899 HIGH RISK MEDICATION USE: ICD-10-CM

## 2024-06-11 DIAGNOSIS — R52 PAIN MANAGEMENT: ICD-10-CM

## 2024-06-11 DIAGNOSIS — I82.90 THROMBOSIS: ICD-10-CM

## 2024-06-11 PROBLEM — IMO0002 LUPUS: Status: RESOLVED | Noted: 2019-05-10 | Resolved: 2024-06-11

## 2024-06-11 PROCEDURE — 3078F DIAST BP <80 MM HG: CPT | Performed by: INTERNAL MEDICINE

## 2024-06-11 PROCEDURE — 1160F RVW MEDS BY RX/DR IN RCRD: CPT | Performed by: INTERNAL MEDICINE

## 2024-06-11 PROCEDURE — 3074F SYST BP LT 130 MM HG: CPT | Performed by: INTERNAL MEDICINE

## 2024-06-11 PROCEDURE — 99214 OFFICE O/P EST MOD 30 MIN: CPT | Performed by: INTERNAL MEDICINE

## 2024-06-11 PROCEDURE — 1159F MED LIST DOCD IN RCRD: CPT | Performed by: INTERNAL MEDICINE

## 2024-06-11 RX ORDER — TRAMADOL HYDROCHLORIDE 50 MG/1
50 TABLET ORAL EVERY 8 HOURS PRN
Qty: 270 TABLET | Refills: 0 | Status: SHIPPED | OUTPATIENT
Start: 2024-06-11

## 2024-06-11 RX ORDER — TIZANIDINE 4 MG/1
TABLET ORAL
Qty: 30 TABLET | Refills: 3 | Status: SHIPPED | OUTPATIENT
Start: 2024-06-11

## 2024-06-11 RX ORDER — HYDROXYCHLOROQUINE SULFATE 200 MG/1
200 TABLET, FILM COATED ORAL 2 TIMES DAILY
Qty: 180 TABLET | Refills: 0 | Status: SHIPPED | OUTPATIENT
Start: 2024-06-11

## 2024-06-11 NOTE — ASSESSMENT & PLAN NOTE
-     hydroxychloroquine (PLAQUENIL) 200 MG tablet; Take 1 tablet by mouth 2 (Two) Times a Day.  Dispense: 180 tablet; Refill: 0  -     tiZANidine (ZANAFLEX) 4 MG tablet; Take 1 tablet HS  Dispense: 30 tablet; Refill: 3  -     traMADol (ULTRAM) 50 MG tablet; Take 1 tablet by mouth Every 8 (Eight) Hours As Needed for Moderate Pain.  Dispense: 270 tablet; Refill: 0  -     CBC & Differential; Future  -     Comprehensive Metabolic Panel; Future  -     Urine Drug Screen - Urine, Clean Catch; Future  -     Urinalysis With Microscopic - Urine, Clean Catch; Future  -     C4+C3; Future  -     dsDNA Antibody by IFA, Shad ramirez, with Reflex to Titer; Future

## 2024-06-11 NOTE — ASSESSMENT & PLAN NOTE
IVC filter placed in 2003. At that time she had a RLE dvt AND EMBOLIC INFARCTS. WAS UNABLE TO BE ANTICOAGULATED TO DUE A SUBSEQUENT LARGE RIGHT INTRACRANIAL HEMORRHAGE. Most recently noted to have broken strut on CT scan with fragment embedded in L3 vertebral body.    Plan:  No longer taking low dose aspirin.   No clots.

## 2024-06-11 NOTE — ASSESSMENT & PLAN NOTE
S/p prescription D.  29.7 in 9/2020 on 1000  D 28.5 in 12/22  44.6 in 1/24    Plan:  5000IU per day.

## 2024-06-11 NOTE — PROGRESS NOTES
[facility] [place]     Office Follow Up      Date: 06/11/2024   Patient Name: Livia Benítez  MRN: [id number]  YOB: 1985    Referring Physician: Shivani Box AP*     No chief complaint on file.      History of Present Illness: Livia Benítez is a 39 y.o. female who is here today for follow up on Lupus.   Her pain is 8/10 in her knees and hands.   She has knee and hand pain, her right hand is worse.  Her cheeks have been red lately.       Denies photosensitive rash, Oronasal ulcers, Pleurisy, Sicca, Raynaud's, Synovitis     Result Review :      Data reviewed : 6/24 UA was contaminated. 1+ protein in urine . CDNA 1:40  low positive.  C3/C4 normal. CMP normal. WBC normal. Platelets look good.        Subjective     Allergies   Allergen Reactions    Dilantin [Phenytoin] Other (See Comments)     Pt unsure         Current Outpatient Medications:     celecoxib (CeleBREX) 200 MG capsule, Take 1 capsule by mouth Daily As Needed., Disp: , Rfl:     DULoxetine (CYMBALTA) 60 MG capsule, Take 1 capsule by mouth Daily., Disp: , Rfl:     hydroxychloroquine (PLAQUENIL) 200 MG tablet, Take 1 tablet by mouth 2 (Two) Times a Day., Disp: 180 tablet, Rfl: 0    lamoTRIgine (LaMICtal) 100 MG tablet, Take 1 tablet by mouth 2 (Two) Times a Day., Disp: , Rfl:     losartan (Cozaar) 50 MG tablet, Take 1 tablet by mouth Daily., Disp: 90 tablet, Rfl: 1    melatonin 5 MG tablet tablet, Take 1 tablet by mouth Every Night., Disp: , Rfl:     omeprazole (priLOSEC) 40 MG capsule, Take 1 capsule by mouth Daily. Before a meal, Disp: , Rfl:     pantoprazole (PROTONIX) 40 MG EC tablet, Take 1 tablet by mouth Daily., Disp: 90 tablet, Rfl: 1    promethazine (PHENERGAN) 25 MG tablet, Take 1 tablet by mouth 3 (Three) Times a Day As Needed for Nausea or Vomiting., Disp: , Rfl:     SUMAtriptan (IMITREX) 100 MG tablet, Take one tablet at onset of headache. May repeat dose one time in  2 hours if headache not relieved., Disp: 6 tablet, Rfl: 0    tiZANidine (ZANAFLEX) 4 MG tablet, Take 1 tablet HS, Disp: 30 tablet, Rfl: 3    Tranexamic Acid (Lysteda) 650 MG tablet, Take 2 tablets by mouth 3 times a day. During menses, Disp: , Rfl:     vitamin D3 125 MCG (5000 UT) capsule capsule, Take 1 capsule by mouth Daily., Disp: , Rfl:     acetaminophen (Tylenol 8 Hour Arthritis Pain) 650 MG 8 hr tablet, Take 1 tablet by mouth Every 8 (Eight) Hours As Needed., Disp: , Rfl:     amoxicillin-clavulanate (AUGMENTIN) 875-125 MG per tablet, Take 1 tablet by mouth 2 (Two) Times a Day., Disp: 20 tablet, Rfl: 0    polyethylene glycol (MiraLax) 17 g packet, Mix 17 grams of powder in 4 to 8 ounces of liquid and take by mouth Daily., Disp: 30 each, Rfl: 3    traMADol (ULTRAM) 50 MG tablet, Take 1 tablet by mouth Every 8 (Eight) Hours As Needed for Moderate Pain., Disp: 270 tablet, Rfl: 0    Past Medical History:   Diagnosis Date    Abnormal Pap smear of cervix     Acute cerebrovascular accident 2003    embolic infarct massive intracranial bleed IVC placed    Arthritis     Autoimmune disorder     Back pain     Blood clots in brain     2003  ALSO ONE IN LEG IN 2003.    CTS (carpal tunnel syndrome) 2018    right hand by EMG/NCV    Dental bridge present     DVT, lower extremity     2003 RLE    Dyspepsia     Fatigue     [place] filter in place     SINCE 2003    High risk medication use     History of RPR test     FTA 2003; s/p tx pcn    History of transfusion 2003    History of unsteady gait     SINCE STROKE    HPV (human papilloma virus) infection     Hx of trichomoniasis 2016    Hypertension 2003    CHTN    IBS (irritable bowel syndrome)     Kidney stone     2008, 2015    Knee joint pain     Lumbar spine pain     Lupus 2002    Lupus (systemic lupus erythematosus)     Migraine     Nausea     Nephritis     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    Seizures     LAST ONE 2003 CVA/bleed    Stroke     2003     Urinary tract infection     Vitamin D deficiency     Weight gain         Past Surgical History:   Procedure Laterality Date    ABDOMINAL SURGERY      CARPAL TUNNEL RELEASE Right 2023     SECTION WITH TUBAL Bilateral 2021    Procedure:  SECTION PRIMARY WITH TUBAL;  Surgeon: Claudine Taylor DO;  Location: [facility] LABOR DELIVERY;  Service: Obstetrics/Gynecology;  Laterality: Bilateral;    EXTRACORPOREAL SHOCK WAVE LITHOTRIPSY (ESWL) Left 2017    Procedure: EXTRACORPOREAL SHOCKWAVE LITHOTRIPSY, FLEXIBLE URETEROSCOPY;  Surgeon: Driss Wharton MD;  Location: [facility] OR;  Service:     GALLBLADDER SURGERY      HAND SURGERY Right     KIDNEY STONE SURGERY      ,     MOUTH SURGERY  2007    wisdom teeth    OTHER SURGICAL HISTORY      IVC filter       Family History   Problem Relation Age of Onset    Diabetes Father     Hypertension Father     Thyroid disease Mother     Heart murmur Mother     Hypertension Brother     Breast cancer Maternal Aunt     Kidney disease Maternal Grandmother         Social History     Socioeconomic History    Marital status: Single   Tobacco Use    Smoking status: Never     Passive exposure: Past    Smokeless tobacco: Never   Vaping Use    Vaping status: Never Used   Substance and Sexual Activity    Alcohol use: Not Currently    Drug use: No    Sexual activity: Defer     Birth control/protection: None       Review of Systems   Constitutional:  Positive for fatigue and unexpected weight gain. Negative for fever.   HENT:  Negative for mouth sores, nosebleeds, swollen glands and trouble swallowing.    Eyes:  Positive for pain. Negative for blurred vision, double vision, photophobia and visual disturbance.        Dry eyes   Respiratory:  Negative for apnea, cough, choking and shortness of breath.    Cardiovascular:  Negative for chest pain, palpitations and leg swelling.        Claudication    Gastrointestinal:  Negative for abdominal pain, blood in  "stool, constipation, diarrhea, nausea, vomiting and GERD.   Endocrine: Positive for polydipsia. Negative for cold intolerance, heat intolerance, polyphagia and polyuria.        Hot flashes   Genitourinary:  Negative for difficulty urinating, dysuria, genital sores, hematuria and urinary incontinence.   Musculoskeletal:  Positive for back pain (low back pain), neck pain and neck stiffness. Negative for arthralgias, gait problem, joint swelling, myalgias and bursitis.        Joint pain  Morning stiffness   Skin:  Positive for bruise. Negative for rash.        Acne    Allergic/Immunologic: Negative for environmental allergies and food allergies.   Neurological:  Positive for headache. Negative for dizziness, tremors, seizures, syncope, weakness, numbness, memory problem and confusion.   Hematological:  Negative for adenopathy. Does not bruise/bleed easily.   Psychiatric/Behavioral:  Negative for sleep disturbance, suicidal ideas, depressed mood and stress. The patient is nervous/anxious.    All other systems reviewed and are negative.       I have reviewed and updated the patient's chief complaint, history of present illness, review of systems, past medical history, surgical history, family history, social history, medications and allergy list as appropriate.     Objective    Vital Signs:   Vitals:    06/11/24 1526   BP: 124/68   Pulse: 83   Temp: 98.3 °F (36.8 °C)   Weight: 115 kg (254 lb 6.4 oz)   Height: 172.7 cm (67.99\")   PainSc:   8   PainLoc: Knee  Comment: Bilateral/Hands     Livia Jeyson reports a pain score of 8.  Given her pain assessment as noted, treatment options were discussed and the following options were decided upon as a follow-up plan to address the patient's pain: continuation of current treatment plan for pain.      Body mass index is 38.69 kg/m².  Class 2 Severe Obesity (BMI >=35 and <=39.9). Obesity-related health conditions include the following: none. Obesity is unchanged. BMI is is above " average; BMI management plan is completed. We discussed portion control and increasing exercise.      Physical Exam  Constitutional:       General: She is not in acute distress.     Appearance: She is not ill-appearing, toxic-appearing or diaphoretic.   HENT:      Right Ear: External ear normal.      Left Ear: External ear normal.      Nose: Nose normal. No congestion.      Mouth/Throat:      Pharynx: Oropharynx is clear. No oropharyngeal exudate or posterior oropharyngeal erythema.   Eyes:      Extraocular Movements: Extraocular movements intact.      Conjunctiva/sclera: Conjunctivae normal.      Pupils: Pupils are equal, round, and reactive to light.   Cardiovascular:      Rate and Rhythm: Normal rate and regular rhythm.      Pulses: Normal pulses.      Heart sounds: Normal heart sounds.   Pulmonary:      Effort: Pulmonary effort is normal.      Breath sounds: Normal breath sounds.   Abdominal:      General: Abdomen is flat. Bowel sounds are normal.      Palpations: Abdomen is soft.   Musculoskeletal:         General: Normal range of motion.   Skin:     General: Skin is warm and dry.      Capillary Refill: Capillary refill takes less than 2 seconds.   Neurological:      General: No focal deficit present.      Mental Status: She is oriented to person, place, and time.      Cranial Nerves: No cranial nerve deficit.      Motor: No weakness.      Deep Tendon Reflexes: Reflexes normal.   Psychiatric:         Mood and Affect: Mood normal.         Behavior: Behavior normal.         Thought Content: Thought content normal.          Physical Exam  There is currently no information documented on the homunculus. Go to the Rheumatology activity and complete the homunculus joint exam.     Results Review:   Imaging Results (Last 24 Hours)       ** No results found for the last 24 hours. **            Procedures    Assessment / Plan    Assessment/Plan:   Diagnoses and all orders for this visit:    1. Systemic lupus  erythematosus, unspecified SLE type, unspecified organ involvement status (Primary)  Assessment & Plan:  Lupus dx 2003: + KESHIA/DNA , low complements, DPGN, thrombocytopenia, s/p Cytoxan;  appears stable; pt looking into BC--NO ESTROGEN (condom/spermicide)!!!   7/22 C3 and C4 normal, DsDna 16(9) - Not a crithidia dna.  Crithidia dna 1:20 in 4/23  She is clinically stable on plaquenil but her August Crithidia dna is 1:320 with normal complements.   8/23 CDNA 1:320  6/24 CDNA 1:40 with normal compliments.     Pain:  Her kidney doctor doesn't want her to take meds everyday so she has more pain.   Continue Plaquenil BID.  Continue tizanidine.  She has H/o seizures. Followed by DR. Cornelius. He did let her take tramadol as needed.  She sees Dr. Griffith now, Adryan retired.   She has difficulty finding something that helps with her joint pain, but Tramadol does help.   Can take Tylenol Arthritis 2 twice daily- No relief.  Seeing Dr. Hernandez in nephrology for eval of proteinuria in context of positive Crithidia dna.  Currently asymptomatic and off steroids.   Topical creams to elbows/ joints- Biofreeze, CBD lotion, UNKERS.  Repeat Lupus Labs before next apt.       2. Vitamin D deficiency  Assessment & Plan:  S/p prescription D.  29.7 in 9/2020 on 1000  D 28.5 in 12/22  44.6 in 1/24    Plan:  5000IU per day.        3. Thrombosis  Assessment & Plan:  IVC filter placed in 2003. At that time she had a RLE dvt AND EMBOLIC INFARCTS. WAS UNABLE TO BE ANTICOAGULATED TO DUE A SUBSEQUENT LARGE RIGHT INTRACRANIAL HEMORRHAGE. Most recently noted to have broken strut on CT scan with fragment embedded in L3 vertebral body.    Plan:  No longer taking low dose aspirin.   No clots.      4. High risk medication use  Assessment & Plan:  Plaquenil well tolerated and effective.  S/p Covid vaccine. S/p Bivalent.    Eye check q 6-9 mos to r/o toxicity- [facility] - due now        Orders:  -     hydroxychloroquine (PLAQUENIL) 200 MG tablet;  Take 1 tablet by mouth 2 (Two) Times a Day.  Dispense: 180 tablet; Refill: 0  -     tiZANidine (ZANAFLEX) 4 MG tablet; Take 1 tablet HS  Dispense: 30 tablet; Refill: 3  -     traMADol (ULTRAM) 50 MG tablet; Take 1 tablet by mouth Every 8 (Eight) Hours As Needed for Moderate Pain.  Dispense: 270 tablet; Refill: 0  -     CBC & Differential; Future  -     Comprehensive Metabolic Panel; Future  -     Urine Drug Screen - Urine, Clean Catch; Future  -     Urinalysis With Microscopic - Urine, Clean Catch; Future  -     C4+C3; Future  -     dsDNA Antibody by IFA, Crithidia luciliae, with Reflex to Titer; Future    5. Pain management  Assessment & Plan:  Impression: Patient cannot take NSAIDs as per renal.  Tramadol does help with her pain  Plan: Sign [employer] pain agreement  UDS  Tramadol 50 mg 1  tab 3 times a day as needed for severe pain    Orders:  -     hydroxychloroquine (PLAQUENIL) 200 MG tablet; Take 1 tablet by mouth 2 (Two) Times a Day.  Dispense: 180 tablet; Refill: 0  -     tiZANidine (ZANAFLEX) 4 MG tablet; Take 1 tablet HS  Dispense: 30 tablet; Refill: 3  -     traMADol (ULTRAM) 50 MG tablet; Take 1 tablet by mouth Every 8 (Eight) Hours As Needed for Moderate Pain.  Dispense: 270 tablet; Refill: 0  -     CBC & Differential; Future  -     Comprehensive Metabolic Panel; Future  -     Urine Drug Screen - Urine, Clean Catch; Future  -     Urinalysis With Microscopic - Urine, Clean Catch; Future  -     C4+C3; Future  -     dsDNA Antibody by IFA, Crithidiena luciliae, with Reflex to Titer; Future                Follow Up:   Return in about 4 months (around 10/11/2024).        Scribed for Iwona Ring MD by Iwona Ring MD 6/11/2024  18:16 EDT      Iwona Ring MD  [facility]

## 2024-06-11 NOTE — ASSESSMENT & PLAN NOTE
Lupus dx 2003: + KESHIA/DNA , low complements, DPGN, thrombocytopenia, s/p Cytoxan;  appears stable; pt looking into BC--NO ESTROGEN (condom/spermicide)!!!   7/22 C3 and C4 normal, DsDna 16(9) - Not a crithidia dna.  Crithidia dna 1:20 in 4/23  She is clinically stable on plaquenil but her August Crithidia dna is 1:320 with normal complements.   8/23 CDNA 1:320  6/24 CDNA 1:40 with normal compliments.     Pain:  Her kidney doctor doesn't want her to take meds everyday so she has more pain.   Continue Plaquenil BID.  Continue tizanidine.  She has H/o seizures. Followed by DR. Cornelius. He did let her take tramadol as needed.  She sees Dr. Griffith now, [facility] retired.   She has difficulty finding something that helps with her joint pain, but Tramadol does help.   Can take Tylenol Arthritis 2 twice daily- No relief.  Seeing Dr. Hernandez in nephrology for eval of proteinuria in context of positive Crithidia dna.  Currently asymptomatic and off steroids.   Topical creams to elbows/ joints- Biofreeze, CBD lotion, UNKERS.  Repeat Lupus Labs before next apt.

## 2024-06-11 NOTE — ASSESSMENT & PLAN NOTE
Impression: Patient cannot take NSAIDs as per renal.  Tramadol does help with her pain  Plan: Sign [employer] pain agreement  UDS  Tramadol 50 mg 1  tab 3 times a day as needed for severe pain

## 2024-06-11 NOTE — ASSESSMENT & PLAN NOTE
Plaquenil well tolerated and effective.  S/p Covid vaccine. S/p Bivalent.    Eye check q 6-9 mos to r/o toxicity- [facility] - due now

## 2024-06-24 ENCOUNTER — APPOINTMENT (OUTPATIENT)
Dept: GENERAL RADIOLOGY | Facility: HOSPITAL | Age: 39
End: 2024-06-24
Payer: COMMERCIAL

## 2024-06-24 ENCOUNTER — HOSPITAL ENCOUNTER (EMERGENCY)
Facility: HOSPITAL | Age: 39
Discharge: HOME | End: 2024-06-24
Attending: EMERGENCY MEDICINE | Admitting: EMERGENCY MEDICINE
Payer: COMMERCIAL

## 2024-06-24 ENCOUNTER — OFFICE VISIT (OUTPATIENT)
Dept: FAMILY MEDICINE CLINIC | Facility: CLINIC | Age: 39
End: 2024-06-24
Payer: COMMERCIAL

## 2024-06-24 VITALS
RESPIRATION RATE: 20 BRPM | HEIGHT: 68 IN | SYSTOLIC BLOOD PRESSURE: 110 MMHG | DIASTOLIC BLOOD PRESSURE: 66 MMHG | OXYGEN SATURATION: 95 % | HEART RATE: 95 BPM | TEMPERATURE: 98.6 F | BODY MASS INDEX: 37.74 KG/M2 | WEIGHT: 249 LBS

## 2024-06-24 VITALS
WEIGHT: 249 LBS | BODY MASS INDEX: 37.74 KG/M2 | DIASTOLIC BLOOD PRESSURE: 76 MMHG | OXYGEN SATURATION: 93 % | HEIGHT: 68 IN | RESPIRATION RATE: 18 BRPM | HEART RATE: 112 BPM | SYSTOLIC BLOOD PRESSURE: 100 MMHG | TEMPERATURE: 97.5 F

## 2024-06-24 DIAGNOSIS — R11.0 NAUSEA: ICD-10-CM

## 2024-06-24 DIAGNOSIS — R55 NEAR SYNCOPE: ICD-10-CM

## 2024-06-24 DIAGNOSIS — Z86.73 HISTORY OF CVA (CEREBROVASCULAR ACCIDENT): ICD-10-CM

## 2024-06-24 DIAGNOSIS — R52 PAIN MANAGEMENT: ICD-10-CM

## 2024-06-24 DIAGNOSIS — R21 RASH AND NONSPECIFIC SKIN ERUPTION: ICD-10-CM

## 2024-06-24 DIAGNOSIS — L03.90 CELLULITIS, UNSPECIFIED CELLULITIS SITE: Primary | ICD-10-CM

## 2024-06-24 DIAGNOSIS — M54.2 NECK PAIN: ICD-10-CM

## 2024-06-24 DIAGNOSIS — Z79.899 HIGH RISK MEDICATION USE: ICD-10-CM

## 2024-06-24 DIAGNOSIS — G44.209 ACUTE NON INTRACTABLE TENSION-TYPE HEADACHE: Primary | ICD-10-CM

## 2024-06-24 DIAGNOSIS — I66.9 BLOOD CLOTS IN BRAIN: Chronic | ICD-10-CM

## 2024-06-24 LAB
ALBUMIN SERPL-MCNC: 4.4 G/DL (ref 3.5–5.2)
ALBUMIN/GLOB SERPL: 1.6 G/DL
ALP SERPL-CCNC: 79 U/L (ref 39–117)
ALT SERPL W P-5'-P-CCNC: 15 U/L (ref 1–33)
ANION GAP SERPL CALCULATED.3IONS-SCNC: 11.8 MMOL/L (ref 5–15)
AST SERPL-CCNC: 21 U/L (ref 1–32)
BASOPHILS # BLD AUTO: 0.03 10*3/MM3 (ref 0–0.2)
BASOPHILS NFR BLD AUTO: 0.4 % (ref 0–1.5)
BILIRUB SERPL-MCNC: 0.3 MG/DL (ref 0–1.2)
BUN SERPL-MCNC: 10 MG/DL (ref 6–20)
BUN/CREAT SERPL: 13.2 (ref 7–25)
CALCIUM SPEC-SCNC: 9.2 MG/DL (ref 8.6–10.5)
CHLORIDE SERPL-SCNC: 103 MMOL/L (ref 98–107)
CO2 SERPL-SCNC: 20.2 MMOL/L (ref 22–29)
CREAT SERPL-MCNC: 0.76 MG/DL (ref 0.57–1)
DEPRECATED RDW RBC AUTO: 44.3 FL (ref 37–54)
EGFRCR SERPLBLD CKD-EPI 2021: 101.7 ML/MIN/1.73
EOSINOPHIL # BLD AUTO: 0.01 10*3/MM3 (ref 0–0.4)
EOSINOPHIL NFR BLD AUTO: 0.1 % (ref 0.3–6.2)
ERYTHROCYTE [DISTWIDTH] IN BLOOD BY AUTOMATED COUNT: 14.3 % (ref 12.3–15.4)
GLOBULIN UR ELPH-MCNC: 2.8 GM/DL
GLUCOSE SERPL-MCNC: 118 MG/DL (ref 65–99)
HCT VFR BLD AUTO: 43 % (ref 34–46.6)
HGB BLD-MCNC: 13.3 G/DL (ref 12–15.9)
HOLD SPECIMEN: NORMAL
HOLD SPECIMEN: NORMAL
IMM GRANULOCYTES # BLD AUTO: 0.03 10*3/MM3 (ref 0–0.05)
IMM GRANULOCYTES NFR BLD AUTO: 0.4 % (ref 0–0.5)
LIPASE SERPL-CCNC: 16 U/L (ref 13–60)
LYMPHOCYTES # BLD AUTO: 1.07 10*3/MM3 (ref 0.7–3.1)
LYMPHOCYTES NFR BLD AUTO: 15 % (ref 19.6–45.3)
MAGNESIUM SERPL-MCNC: 2.1 MG/DL (ref 1.6–2.6)
MCH RBC QN AUTO: 26.5 PG (ref 26.6–33)
MCHC RBC AUTO-ENTMCNC: 30.9 G/DL (ref 31.5–35.7)
MCV RBC AUTO: 85.7 FL (ref 79–97)
MONOCYTES # BLD AUTO: 0.56 10*3/MM3 (ref 0.1–0.9)
MONOCYTES NFR BLD AUTO: 7.9 % (ref 5–12)
NEUTROPHILS NFR BLD AUTO: 5.42 10*3/MM3 (ref 1.7–7)
NEUTROPHILS NFR BLD AUTO: 76.2 % (ref 42.7–76)
NRBC BLD AUTO-RTO: 0 /100 WBC (ref 0–0.2)
PLATELET # BLD AUTO: 199 10*3/MM3 (ref 140–450)
PMV BLD AUTO: 10.1 FL (ref 6–12)
POTASSIUM SERPL-SCNC: 4.3 MMOL/L (ref 3.5–5.2)
PROT SERPL-MCNC: 7.2 G/DL (ref 6–8.5)
QT INTERVAL: 336 MS
QT INTERVAL: 366 MS
QTC INTERVAL: 420 MS
QTC INTERVAL: 467 MS
RBC # BLD AUTO: 5.02 10*6/MM3 (ref 3.77–5.28)
SODIUM SERPL-SCNC: 135 MMOL/L (ref 136–145)
TROPONIN T SERPL HS-MCNC: <6 NG/L
WBC NRBC COR # BLD AUTO: 7.12 10*3/MM3 (ref 3.4–10.8)
WHOLE BLOOD HOLD COAG: NORMAL
WHOLE BLOOD HOLD SPECIMEN: NORMAL

## 2024-06-24 PROCEDURE — 96374 THER/PROPH/DIAG INJ IV PUSH: CPT

## 2024-06-24 PROCEDURE — 36415 COLL VENOUS BLD VENIPUNCTURE: CPT

## 2024-06-24 PROCEDURE — 3078F DIAST BP <80 MM HG: CPT | Performed by: NURSE PRACTITIONER

## 2024-06-24 PROCEDURE — 25010000002 PROCHLORPERAZINE 10 MG/2ML SOLUTION: Performed by: EMERGENCY MEDICINE

## 2024-06-24 PROCEDURE — 96375 TX/PRO/DX INJ NEW DRUG ADDON: CPT

## 2024-06-24 PROCEDURE — 93010 ELECTROCARDIOGRAM REPORT: CPT | Performed by: INTERNAL MEDICINE

## 2024-06-24 PROCEDURE — 83690 ASSAY OF LIPASE: CPT | Performed by: EMERGENCY MEDICINE

## 2024-06-24 PROCEDURE — 1159F MED LIST DOCD IN RCRD: CPT | Performed by: NURSE PRACTITIONER

## 2024-06-24 PROCEDURE — 93005 ELECTROCARDIOGRAM TRACING: CPT | Performed by: EMERGENCY MEDICINE

## 2024-06-24 PROCEDURE — 80053 COMPREHEN METABOLIC PANEL: CPT | Performed by: EMERGENCY MEDICINE

## 2024-06-24 PROCEDURE — 71045 X-RAY EXAM CHEST 1 VIEW: CPT

## 2024-06-24 PROCEDURE — 99214 OFFICE O/P EST MOD 30 MIN: CPT | Performed by: NURSE PRACTITIONER

## 2024-06-24 PROCEDURE — S0119 ONDANSETRON 4 MG: HCPCS | Performed by: NURSE PRACTITIONER

## 2024-06-24 PROCEDURE — 99284 EMERGENCY DEPT VISIT MOD MDM: CPT

## 2024-06-24 PROCEDURE — 1160F RVW MEDS BY RX/DR IN RCRD: CPT | Performed by: NURSE PRACTITIONER

## 2024-06-24 PROCEDURE — 3074F SYST BP LT 130 MM HG: CPT | Performed by: NURSE PRACTITIONER

## 2024-06-24 PROCEDURE — 71045 X-RAY EXAM CHEST 1 VIEW: CPT | Performed by: RADIOLOGY

## 2024-06-24 PROCEDURE — 83735 ASSAY OF MAGNESIUM: CPT | Performed by: EMERGENCY MEDICINE

## 2024-06-24 PROCEDURE — 1125F AMNT PAIN NOTED PAIN PRSNT: CPT | Performed by: NURSE PRACTITIONER

## 2024-06-24 PROCEDURE — 84484 ASSAY OF TROPONIN QUANT: CPT | Performed by: EMERGENCY MEDICINE

## 2024-06-24 PROCEDURE — 25010000002 DIPHENHYDRAMINE PER 50 MG: Performed by: EMERGENCY MEDICINE

## 2024-06-24 PROCEDURE — 25810000003 SODIUM CHLORIDE 0.9 % SOLUTION: Performed by: EMERGENCY MEDICINE

## 2024-06-24 PROCEDURE — 85025 COMPLETE CBC W/AUTO DIFF WBC: CPT | Performed by: EMERGENCY MEDICINE

## 2024-06-24 RX ORDER — TIZANIDINE 4 MG/1
4 TABLET ORAL EVERY 8 HOURS PRN
Qty: 90 TABLET | Refills: 0 | Status: SHIPPED | OUTPATIENT
Start: 2024-06-24

## 2024-06-24 RX ORDER — METHYLPREDNISOLONE 4 MG/1
TABLET ORAL
Qty: 21 TABLET | Refills: 0 | Status: SHIPPED | OUTPATIENT
Start: 2024-06-24 | End: 2024-06-24

## 2024-06-24 RX ORDER — SODIUM CHLORIDE 0.9 % (FLUSH) 0.9 %
10 SYRINGE (ML) INJECTION AS NEEDED
Status: DISCONTINUED | OUTPATIENT
Start: 2024-06-24 | End: 2024-06-24 | Stop reason: HOSPADM

## 2024-06-24 RX ORDER — PROCHLORPERAZINE EDISYLATE 5 MG/ML
10 INJECTION INTRAMUSCULAR; INTRAVENOUS ONCE
Status: COMPLETED | OUTPATIENT
Start: 2024-06-24 | End: 2024-06-24

## 2024-06-24 RX ORDER — ONDANSETRON 4 MG/1
4 TABLET, ORALLY DISINTEGRATING ORAL ONCE
Status: COMPLETED | OUTPATIENT
Start: 2024-06-24 | End: 2024-06-24

## 2024-06-24 RX ORDER — DIPHENHYDRAMINE HYDROCHLORIDE 50 MG/ML
25 INJECTION INTRAMUSCULAR; INTRAVENOUS ONCE
Status: COMPLETED | OUTPATIENT
Start: 2024-06-24 | End: 2024-06-24

## 2024-06-24 RX ORDER — DOXYCYCLINE HYCLATE 100 MG/1
100 CAPSULE ORAL 2 TIMES DAILY
Qty: 20 CAPSULE | Refills: 0 | Status: SHIPPED | OUTPATIENT
Start: 2024-06-24

## 2024-06-24 RX ADMIN — DIPHENHYDRAMINE HYDROCHLORIDE 25 MG: 50 INJECTION, SOLUTION INTRAMUSCULAR; INTRAVENOUS at 13:36

## 2024-06-24 RX ADMIN — ONDANSETRON 4 MG: 4 TABLET, ORALLY DISINTEGRATING ORAL at 14:52

## 2024-06-24 RX ADMIN — SODIUM CHLORIDE 1000 ML: 9 INJECTION, SOLUTION INTRAVENOUS at 13:33

## 2024-06-24 RX ADMIN — PROCHLORPERAZINE EDISYLATE 10 MG: 5 INJECTION INTRAMUSCULAR; INTRAVENOUS at 13:34

## 2024-06-24 NOTE — ED NOTES
Patient is sitting in bed with family at bedside. Patient and family has been updated. Patient vital signs are stable. All safety measures in place. Call light in reach.

## 2024-06-24 NOTE — ED PROVIDER NOTES
Subjective   History of Present Illness  Livia is a 39-year-old female who presents to the ED via EMS for a possible syncopal episode.  Patient states that she was at her doctor's office and they had a hard time getting her blood pressure on her, stated that she was lightheaded and states she did not pass out.  Patient is in no acute distress during my evaluation she is sitting upright in bed in a position of comfort.  Patient is complaining of a headache, she states she has had a headache for 3 days she has taken her Tylenol and her Maxalt and it has not helped.  Patient also complains of right shoulder pain.  Patient has sound and light sensitivities, she states this is a normal headache for her it is just a longer duration.        Review of Systems   All other systems reviewed and are negative.      Past Medical History:   Diagnosis Date    Abnormal Pap smear of cervix     Acute cerebrovascular accident 2003    embolic infarct massive intracranial bleed IVC placed    Arthritis     Autoimmune disorder     Back pain     Blood clots in brain     2003  ALSO ONE IN LEG IN 2003.    CTS (carpal tunnel syndrome) 2018    right hand by EMG/NCV    Dental bridge present     DVT, lower extremity     2003 RLE    Dyspepsia     Fatigue     Isabella filter in place     SINCE 2003    High risk medication use     History of RPR test     FTA 2003; s/p tx pcn    History of transfusion 2003    History of unsteady gait     SINCE STROKE    HPV (human papilloma virus) infection     Hx of trichomoniasis 2016    Hypertension 2003    CHTN    IBS (irritable bowel syndrome)     Kidney stone     2008, 2015    Knee joint pain     Lumbar spine pain     Lupus 2002    Lupus (systemic lupus erythematosus)     Migraine     Nausea     Nephritis     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    Seizures     LAST ONE 2003 CVA/bleed    Stroke     2003    Urinary tract infection     Vitamin D deficiency     Weight gain        Allergies    Allergen Reactions    Dilantin [Phenytoin] Other (See Comments)     Pt unsure       Past Surgical History:   Procedure Laterality Date    ABDOMINAL SURGERY      CARPAL TUNNEL RELEASE Right 2023     SECTION WITH TUBAL Bilateral 2021    Procedure:  SECTION PRIMARY WITH TUBAL;  Surgeon: Claudine Taylor DO;  Location: [facility] LABOR DELIVERY;  Service: Obstetrics/Gynecology;  Laterality: Bilateral;    EXTRACORPOREAL SHOCK WAVE LITHOTRIPSY (ESWL) Left 2017    Procedure: EXTRACORPOREAL SHOCKWAVE LITHOTRIPSY, FLEXIBLE URETEROSCOPY;  Surgeon: Driss Wharton MD;  Location: [facility] OR;  Service:     GALLBLADDER SURGERY  2008    HAND SURGERY Right     KIDNEY STONE SURGERY      ,     MOUTH SURGERY  2007    wisdom teeth    OTHER SURGICAL HISTORY      IVC filter       Family History   Problem Relation Age of Onset    Diabetes Father     Hypertension Father     Thyroid disease Mother     Heart murmur Mother     Hypertension Brother     Breast cancer Maternal Aunt     Kidney disease Maternal Grandmother        Social History     Socioeconomic History    Marital status: Single   Tobacco Use    Smoking status: Never     Passive exposure: Past    Smokeless tobacco: Never   Vaping Use    Vaping status: Never Used   Substance and Sexual Activity    Alcohol use: Not Currently    Drug use: No    Sexual activity: Defer     Birth control/protection: None           Objective   Physical Exam  Vitals reviewed.   Constitutional:       Appearance: Normal appearance. She is normal weight.   HENT:      Head: Normocephalic and atraumatic.      Right Ear: External ear normal.      Left Ear: External ear normal.      Nose: Nose normal.      Mouth/Throat:      Mouth: Mucous membranes are moist. Mucous membranes are dry.      Pharynx: Oropharynx is clear.   Eyes:      Extraocular Movements: Extraocular movements intact.      Conjunctiva/sclera: Conjunctivae normal.      Pupils: Pupils are equal,  round, and reactive to light.   Cardiovascular:      Rate and Rhythm: Normal rate and regular rhythm.      Pulses: Normal pulses.      Heart sounds: Normal heart sounds.   Pulmonary:      Effort: Pulmonary effort is normal.      Breath sounds: Normal breath sounds.   Abdominal:      General: Bowel sounds are normal.      Palpations: Abdomen is soft.   Musculoskeletal:         General: Normal range of motion.      Cervical back: Normal range of motion and neck supple. No rigidity.   Skin:     General: Skin is warm and dry.      Capillary Refill: Capillary refill takes less than 2 seconds.      Findings: Erythema and rash present.      Comments: Erythema to the right upper quadrant   Neurological:      General: No focal deficit present.      Mental Status: She is alert and oriented to person, place, and time.   Psychiatric:         Mood and Affect: Mood normal.         Procedures           ED Course                                             Medical Decision Making  Livia is a 39-year-old female who presents to the ED via EMS for a possible syncopal episode.  Patient will have basic labs chest x-ray EKG to evaluate for possible metabolic causes of her lightheadedness.  Also given a liter of IV fluid    EKG was obtained and revealed an accelerated junctional rhythm,  There is some artifact appears to be sinus ventricular rate 98.  No acute ST changes to signify ischemia.  ---    Secondary to patient's lupus patient will not get Toradol but will get Compazine and Benadryl for her headache.  ---    Chest x-ray is read as no acute process  Labs returned without acute abnormality.    Patient subjectively better will be discharged to follow-up with her regular doctor.    Amount and/or Complexity of Data Reviewed  Labs: ordered.  Radiology: ordered.  ECG/medicine tests: ordered.    Risk  Prescription drug management.        Final diagnoses:   Acute non intractable tension-type headache   Rash and nonspecific skin eruption    Near syncope       ED Disposition  ED Disposition       ED Disposition   Discharge    Condition   Stable    Comment   --               Shivani Box, APRN  [address] [address]  [phone]               Medication List      No changes were made to your prescriptions during this visit.            Wiliam Swift MD  06/24/24 1640

## 2024-06-24 NOTE — ED NOTES
Assumed care from [facility] at this time. Introduced self to patient at this time. There is no acute distress that has been noted at this time. No new complaints have been expressed by patient at this time. Patient has been updated and has been made aware of her plan of care at this time.

## 2024-06-24 NOTE — PROGRESS NOTES
"Milind Benítez is a 39 y.o. female.     Chief Complaint   Patient presents with    Neck Pain    Headache    Insect Bite       History of Present Illness  She presents with c/o headache for 3 days. She states its in her left temple right now. She describes it as an ache. She c/o nausea. She denies sensitivity to light and sound. She has taken maxalt and tylenol.  She sees neurology who prescribes the Maxalt and she states they gave her a sample new medication but she has not taken it yet.  She presents with c/o a sore on her stomach for about a week. She states the redness around it is spreading. She thinks it may be a spider bite. She c/o a pulled muscle in her right shoulder and neck. She takes muscle relaxers at night.        The following portions of the patient's history were reviewed and updated as appropriate: allergies, current medications, past family history, past medical history, past social history, past surgical history and problem list.    Review of Systems   Constitutional:  Positive for fever. Negative for fatigue and unexpected weight change.   Respiratory:  Negative for cough, chest tightness and shortness of breath.    Cardiovascular:  Negative for chest pain and palpitations.   Gastrointestinal:  Positive for nausea. Negative for blood in stool, constipation, diarrhea and vomiting.   Genitourinary:  Negative for dysuria.   Musculoskeletal:  Positive for arthralgias, myalgias, neck pain and neck stiffness.   Skin:  Positive for wound.   Neurological:  Positive for headaches.       Objective     /76 (BP Location: Right arm, Patient Position: Sitting, Cuff Size: Large Adult)   Pulse 112   Temp 97.5 °F (36.4 °C) (Temporal)   Resp 18   Ht 172.7 cm (67.99\")   Wt 113 kg (249 lb)   LMP 05/24/2024   SpO2 93%   BMI 37.87 kg/m²     Physical Exam  Vitals reviewed.   Constitutional:       General: She is not in acute distress.     Appearance: Normal appearance. She is well-developed. " She is not diaphoretic.   HENT:      Head: Normocephalic and atraumatic.   Cardiovascular:      Rate and Rhythm: Normal rate and regular rhythm.      Heart sounds: Normal heart sounds, S1 normal and S2 normal. No murmur heard.     No friction rub. No gallop.   Pulmonary:      Effort: Pulmonary effort is normal. No respiratory distress.      Breath sounds: Normal breath sounds.   Abdominal:      General: Bowel sounds are normal. There is no distension.      Palpations: Abdomen is soft.      Tenderness: There is no abdominal tenderness.   Musculoskeletal:      Cervical back: Spasms present. Decreased range of motion.      Comments: Decreased rotation to the right.   Skin:     General: Skin is warm and dry.             Comments: Circular area of erythema on the right lower abdominal wall with puncture wounds in the center.   Neurological:      Mental Status: She is alert and oriented to person, place, and time.   Psychiatric:         Behavior: Behavior normal.         Thought Content: Thought content normal.         Judgment: Judgment normal.         Current Outpatient Medications   Medication Sig Dispense Refill    acetaminophen (Tylenol 8 Hour Arthritis Pain) 650 MG 8 hr tablet Take 1 tablet by mouth Every 8 (Eight) Hours As Needed.      DULoxetine (CYMBALTA) 60 MG capsule Take 1 capsule by mouth Daily.      hydroxychloroquine (PLAQUENIL) 200 MG tablet Take 1 tablet by mouth 2 (Two) Times a Day. 180 tablet 0    lamoTRIgine (LaMICtal) 100 MG tablet Take 1 tablet by mouth 2 (Two) Times a Day.      losartan (Cozaar) 50 MG tablet Take 1 tablet by mouth Daily. 90 tablet 1    melatonin 5 MG tablet tablet Take 1 tablet by mouth Every Night.      omeprazole (priLOSEC) 40 MG capsule Take 1 capsule by mouth Daily. Before a meal      pantoprazole (PROTONIX) 40 MG EC tablet Take 1 tablet by mouth Daily. 90 tablet 1    promethazine (PHENERGAN) 25 MG tablet Take 1 tablet by mouth 3 (Three) Times a Day As Needed for Nausea or  Vomiting.      tiZANidine (ZANAFLEX) 4 MG tablet Take 1 tablet by mouth Every 8 (Eight) Hours As Needed for Muscle Spasms. Take 1 tablet HS 90 tablet 0    traMADol (ULTRAM) 50 MG tablet Take 1 tablet by mouth Every 8 (Eight) Hours As Needed for Moderate Pain. 270 tablet 0    Tranexamic Acid (Lysteda) 650 MG tablet Take 2 tablets by mouth 3 times a day. During menses      vitamin D3 125 MCG (5000 UT) capsule capsule Take 1 capsule by mouth Daily.      doxycycline (VIBRAMYCIN) 100 MG capsule Take 1 capsule by mouth 2 (Two) Times a Day. 20 capsule 0     No current facility-administered medications for this visit.            Assessment & Plan     Problem List Items Addressed This Visit       High risk medication use    Relevant Medications    tiZANidine (ZANAFLEX) 4 MG tablet    Pain management    Relevant Medications    tiZANidine (ZANAFLEX) 4 MG tablet     Other Visit Diagnoses       Cellulitis, unspecified cellulitis site    -  Primary    Relevant Medications    doxycycline (VIBRAMYCIN) 100 MG capsule    Neck pain        Near syncope        History of CVA (cerebrovascular accident)        Blood clots in brain  (Chronic)       History of              ICD-10-CM ICD-9-CM   1. Cellulitis, unspecified cellulitis site  L03.90 682.9   2. Neck pain  M54.2 723.1   3. High risk medication use  Z79.899 V58.69   4. Pain management  R52 780.96   5. Near syncope  R55 780.2   6. History of CVA (cerebrovascular accident)  Z86.73 V12.54   7. Blood clots in brain  I66.9 434.00       Plan: I ordered doxycycline for the cellulitis of the abdominal wall.  I went to look for [facility] samples for her headache.  When I returned she fell over but did not lose consciousness.  She became in distress.  My MA and myself were unable to get a blood pressure.  Pulse was 75 and pulse ox was 99%.  She was throwing up in the sink.  We called the ambulance.  I recommended she go to the emergency room with a EMS due to her very near syncope and her  history of CVA as a teenager.  She also has history of seizures.  Last seizure was in 2003.  Her mom states she has a mass on her brain from the CVA and she has history of blood clot in the brain.  Patient left via EMS.    @Body mass index is 37.87 kg/m².            Understands disease processes and need for medications.  Understands reasons for urgent and emergent care.  Patient (& family) verbalized agreement for treatment plan.   Emotional support and active listening provided.  Patient provided time to verbalize feelings.                  This document has been electronically signed by DANIEL Restrepo   June 24, 2024 11:55 EDT

## 2024-07-30 ENCOUNTER — OFFICE VISIT (OUTPATIENT)
Dept: FAMILY MEDICINE CLINIC | Facility: CLINIC | Age: 39
End: 2024-07-30
Payer: COMMERCIAL

## 2024-07-30 VITALS
HEIGHT: 68 IN | BODY MASS INDEX: 37.89 KG/M2 | HEART RATE: 123 BPM | RESPIRATION RATE: 16 BRPM | SYSTOLIC BLOOD PRESSURE: 118 MMHG | OXYGEN SATURATION: 96 % | TEMPERATURE: 97.5 F | WEIGHT: 250 LBS | DIASTOLIC BLOOD PRESSURE: 78 MMHG

## 2024-07-30 DIAGNOSIS — I10 ESSENTIAL HYPERTENSION: Chronic | ICD-10-CM

## 2024-07-30 DIAGNOSIS — K21.9 GASTROESOPHAGEAL REFLUX DISEASE, UNSPECIFIED WHETHER ESOPHAGITIS PRESENT: Chronic | ICD-10-CM

## 2024-07-30 PROCEDURE — 1159F MED LIST DOCD IN RCRD: CPT | Performed by: NURSE PRACTITIONER

## 2024-07-30 PROCEDURE — 3078F DIAST BP <80 MM HG: CPT | Performed by: NURSE PRACTITIONER

## 2024-07-30 PROCEDURE — 1160F RVW MEDS BY RX/DR IN RCRD: CPT | Performed by: NURSE PRACTITIONER

## 2024-07-30 PROCEDURE — 99213 OFFICE O/P EST LOW 20 MIN: CPT | Performed by: NURSE PRACTITIONER

## 2024-07-30 PROCEDURE — 1125F AMNT PAIN NOTED PAIN PRSNT: CPT | Performed by: NURSE PRACTITIONER

## 2024-07-30 PROCEDURE — 3074F SYST BP LT 130 MM HG: CPT | Performed by: NURSE PRACTITIONER

## 2024-07-30 RX ORDER — PANTOPRAZOLE SODIUM 40 MG/1
40 TABLET, DELAYED RELEASE ORAL DAILY
Qty: 90 TABLET | Refills: 1 | Status: SHIPPED | OUTPATIENT
Start: 2024-07-30

## 2024-07-30 RX ORDER — LOSARTAN POTASSIUM 50 MG/1
50 TABLET ORAL DAILY
Qty: 90 TABLET | Refills: 1 | Status: SHIPPED | OUTPATIENT
Start: 2024-07-30

## 2024-07-30 RX ORDER — RIMEGEPANT SULFATE 75 MG/75MG
75 TABLET, ORALLY DISINTEGRATING ORAL AS NEEDED
COMMUNITY

## 2024-07-30 NOTE — PROGRESS NOTES
"Milind Benítez is a 39 y.o. female.     Chief Complaint   Patient presents with    Hypertension       History of Present Illness   She presents for follow up of essential hypertension. She sees neurology for migraines., note reviewed. She reports good blood pressure control.        The following portions of the patient's history were reviewed and updated as appropriate: allergies, current medications, past family history, past medical history, past social history, past surgical history and problem list.    Review of Systems   Constitutional:  Negative for fatigue.   Respiratory:  Negative for cough, shortness of breath and wheezing.    Cardiovascular:  Negative for chest pain and palpitations.   Gastrointestinal:  Negative for diarrhea, nausea and vomiting.   Neurological:  Positive for headaches.       Objective     /78 (BP Location: Right arm, Patient Position: Sitting, Cuff Size: Large Adult)   Pulse (!) 123   Temp 97.5 °F (36.4 °C) (Oral)   Resp 16   Ht 172.7 cm (67.99\")   Wt 113 kg (250 lb)   SpO2 96%   BMI 38.02 kg/m²     Physical Exam  Vitals reviewed.   Constitutional:       General: She is not in acute distress.     Appearance: Normal appearance. She is well-developed. She is not diaphoretic.   HENT:      Head: Normocephalic and atraumatic.   Cardiovascular:      Rate and Rhythm: Regular rhythm. Tachycardia present.      Heart sounds: Normal heart sounds, S1 normal and S2 normal. No murmur heard.     No friction rub. No gallop.   Pulmonary:      Effort: Pulmonary effort is normal. No respiratory distress.      Breath sounds: Normal breath sounds.   Abdominal:      General: Bowel sounds are normal. There is no distension.      Palpations: Abdomen is soft.      Tenderness: There is no abdominal tenderness.   Skin:     General: Skin is warm and dry.   Neurological:      Mental Status: She is alert and oriented to person, place, and time.   Psychiatric:         Behavior: Behavior normal. "         Thought Content: Thought content normal.         Judgment: Judgment normal.         Current Outpatient Medications   Medication Sig Dispense Refill    acetaminophen (Tylenol 8 Hour Arthritis Pain) 650 MG 8 hr tablet Take 1 tablet by mouth Every 8 (Eight) Hours As Needed.      DULoxetine (CYMBALTA) 60 MG capsule Take 1 capsule by mouth Daily.      hydroxychloroquine (PLAQUENIL) 200 MG tablet Take 1 tablet by mouth 2 (Two) Times a Day. 180 tablet 0    lamoTRIgine (LaMICtal) 100 MG tablet Take 1 tablet by mouth 2 (Two) Times a Day.      losartan (Cozaar) 50 MG tablet Take 1 tablet by mouth Daily. 90 tablet 1    melatonin 5 MG tablet tablet Take 1 tablet by mouth Every Night.      Nurtec 75 MG dispersible tablet Take 1 tablet by mouth As Needed.      pantoprazole (PROTONIX) 40 MG EC tablet Take 1 tablet by mouth Daily. 90 tablet 1    promethazine (PHENERGAN) 25 MG tablet Take 1 tablet by mouth 3 (Three) Times a Day As Needed for Nausea or Vomiting.      tiZANidine (ZANAFLEX) 4 MG tablet Take 1 tablet by mouth Every 8 (Eight) Hours As Needed for Muscle Spasms. Take 1 tablet HS 90 tablet 0    traMADol (ULTRAM) 50 MG tablet Take 1 tablet by mouth Every 8 (Eight) Hours As Needed for Moderate Pain. 270 tablet 0    Tranexamic Acid (Lysteda) 650 MG tablet Take 2 tablets by mouth 3 times a day. During menses      vitamin D3 125 MCG (5000 UT) capsule capsule Take 1 capsule by mouth Daily.       No current facility-administered medications for this visit.            Assessment & Plan     Problem List Items Addressed This Visit       Essential hypertension    Relevant Medications    losartan (Cozaar) 50 MG tablet    Gastroesophageal reflux disease    Relevant Medications    pantoprazole (PROTONIX) 40 MG EC tablet         ICD-10-CM ICD-9-CM   1. Essential hypertension  I10 401.9   2. Gastroesophageal reflux disease, unspecified whether esophagitis present  K21.9 530.81       Plan: Continue current meds.  Keep follow-ups  with neurology.  Follow-up in 6 months and as needed.    @Body mass index is 38.02 kg/m².              Understands disease processes and need for medications.  Understands reasons for urgent and emergent care.  Patient (& family) verbalized agreement for treatment plan.   Emotional support and active listening provided.  Patient provided time to verbalize feelings.                  This document has been electronically signed by DANIEL Restrepo   July 30, 2024 13:38 EDT

## 2024-09-27 ENCOUNTER — TELEPHONE (OUTPATIENT)
Age: 39
End: 2024-09-27
Payer: COMMERCIAL

## 2024-10-07 DIAGNOSIS — Z79.899 HIGH RISK MEDICATION USE: ICD-10-CM

## 2024-10-07 DIAGNOSIS — R52 PAIN MANAGEMENT: ICD-10-CM

## 2024-10-10 DIAGNOSIS — R52 PAIN MANAGEMENT: ICD-10-CM

## 2024-10-10 DIAGNOSIS — Z79.899 HIGH RISK MEDICATION USE: ICD-10-CM

## 2024-10-10 RX ORDER — HYDROXYCHLOROQUINE SULFATE 200 MG/1
200 TABLET, FILM COATED ORAL 2 TIMES DAILY
Qty: 180 TABLET | Refills: 0 | Status: SHIPPED | OUTPATIENT
Start: 2024-10-10

## 2024-10-10 NOTE — TELEPHONE ENCOUNTER
Caller: Priscila Benítezy    Relationship: Self    Best call back number: [phone]     Requested Prescriptions:   Requested Prescriptions     Pending Prescriptions Disp Refills    hydroxychloroquine (PLAQUENIL) 200 MG tablet 180 tablet 0     Sig: Take 1 tablet by mouth 2 (Two) Times a Day.        Pharmacy where request should be sent:  [facility]    Last office visit with prescribing clinician: 6/11/2024   Last telemedicine visit with prescribing clinician: Visit date not found   Next office visit with prescribing clinician: 10/16/2024     Additional details provided by patient: N/A    Does the patient have less than a 3 day supply:  [x] Yes  [] No    Would you like a call back once the refill request has been completed: [] Yes [x] No    If the office needs to give you a call back, can they leave a voicemail: [] Yes [x] No    Ranjit Mcfarland Rep   10/10/24 14:20 EDT

## 2024-10-12 RX ORDER — TRAMADOL HYDROCHLORIDE 50 MG/1
50 TABLET ORAL EVERY 8 HOURS PRN
Qty: 270 TABLET | Refills: 0 | Status: SHIPPED | OUTPATIENT
Start: 2024-10-12

## 2024-10-12 NOTE — TELEPHONE ENCOUNTER
Please call the patient and let her know that she has labs that are due including a urine drug screen in order to continue her medication for the future.  I put them in the [facility] system at her last visit so she can go [facility] them [facility] before her upcoming visit.

## 2024-10-14 ENCOUNTER — LAB (OUTPATIENT)
Dept: LAB | Facility: HOSPITAL | Age: 39
End: 2024-10-14
Payer: COMMERCIAL

## 2024-10-14 DIAGNOSIS — Z79.899 HIGH RISK MEDICATION USE: ICD-10-CM

## 2024-10-14 DIAGNOSIS — R52 PAIN MANAGEMENT: ICD-10-CM

## 2024-10-14 LAB
AMPHET+METHAMPHET UR QL: NEGATIVE
AMPHETAMINES UR QL: NEGATIVE
BACTERIA UR QL AUTO: ABNORMAL /HPF
BARBITURATES UR QL SCN: NEGATIVE
BASOPHILS # BLD AUTO: 0.02 10*3/MM3 (ref 0–0.2)
BASOPHILS NFR BLD AUTO: 0.3 % (ref 0–1.5)
BENZODIAZ UR QL SCN: NEGATIVE
BILIRUB UR QL STRIP: NEGATIVE
BUPRENORPHINE SERPL-MCNC: NEGATIVE NG/ML
CANNABINOIDS SERPL QL: NEGATIVE
CLARITY UR: CLEAR
COCAINE UR QL: NEGATIVE
COLOR UR: YELLOW
DEPRECATED RDW RBC AUTO: 42.4 FL (ref 37–54)
EOSINOPHIL # BLD AUTO: 0.09 10*3/MM3 (ref 0–0.4)
EOSINOPHIL NFR BLD AUTO: 1.2 % (ref 0.3–6.2)
ERYTHROCYTE [DISTWIDTH] IN BLOOD BY AUTOMATED COUNT: 14.2 % (ref 12.3–15.4)
FENTANYL UR-MCNC: NEGATIVE NG/ML
GLUCOSE UR STRIP-MCNC: NEGATIVE MG/DL
HCT VFR BLD AUTO: 44.2 % (ref 34–46.6)
HGB BLD-MCNC: 13.7 G/DL (ref 12–15.9)
HGB UR QL STRIP.AUTO: NEGATIVE
HYALINE CASTS UR QL AUTO: ABNORMAL /LPF
IMM GRANULOCYTES # BLD AUTO: 0.01 10*3/MM3 (ref 0–0.05)
IMM GRANULOCYTES NFR BLD AUTO: 0.1 % (ref 0–0.5)
KETONES UR QL STRIP: NEGATIVE
LEUKOCYTE ESTERASE UR QL STRIP.AUTO: NEGATIVE
LYMPHOCYTES # BLD AUTO: 2.16 10*3/MM3 (ref 0.7–3.1)
LYMPHOCYTES NFR BLD AUTO: 29.2 % (ref 19.6–45.3)
MCH RBC QN AUTO: 25.7 PG (ref 26.6–33)
MCHC RBC AUTO-ENTMCNC: 31 G/DL (ref 31.5–35.7)
MCV RBC AUTO: 82.9 FL (ref 79–97)
METHADONE UR QL SCN: NEGATIVE
MONOCYTES # BLD AUTO: 0.52 10*3/MM3 (ref 0.1–0.9)
MONOCYTES NFR BLD AUTO: 7 % (ref 5–12)
NEUTROPHILS NFR BLD AUTO: 4.6 10*3/MM3 (ref 1.7–7)
NEUTROPHILS NFR BLD AUTO: 62.2 % (ref 42.7–76)
NITRITE UR QL STRIP: NEGATIVE
NRBC BLD AUTO-RTO: 0 /100 WBC (ref 0–0.2)
OPIATES UR QL: NEGATIVE
OXYCODONE UR QL SCN: NEGATIVE
PCP UR QL SCN: NEGATIVE
PH UR STRIP.AUTO: 6 [PH] (ref 5–8)
PLATELET # BLD AUTO: 258 10*3/MM3 (ref 140–450)
PMV BLD AUTO: 10.6 FL (ref 6–12)
PROT UR QL STRIP: ABNORMAL
RBC # BLD AUTO: 5.33 10*6/MM3 (ref 3.77–5.28)
RBC # UR STRIP: ABNORMAL /HPF
REF LAB TEST METHOD: ABNORMAL
SP GR UR STRIP: 1.01 (ref 1–1.03)
SQUAMOUS #/AREA URNS HPF: ABNORMAL /HPF
TRICYCLICS UR QL SCN: NEGATIVE
UROBILINOGEN UR QL STRIP: ABNORMAL
WBC # UR STRIP: ABNORMAL /HPF
WBC NRBC COR # BLD AUTO: 7.4 10*3/MM3 (ref 3.4–10.8)

## 2024-10-14 PROCEDURE — 86160 COMPLEMENT ANTIGEN: CPT

## 2024-10-14 PROCEDURE — 80307 DRUG TEST PRSMV CHEM ANLYZR: CPT

## 2024-10-14 PROCEDURE — 85025 COMPLETE CBC W/AUTO DIFF WBC: CPT

## 2024-10-14 PROCEDURE — 36415 COLL VENOUS BLD VENIPUNCTURE: CPT

## 2024-10-14 PROCEDURE — 81001 URINALYSIS AUTO W/SCOPE: CPT

## 2024-10-14 PROCEDURE — 86255 FLUORESCENT ANTIBODY SCREEN: CPT

## 2024-10-14 PROCEDURE — 80053 COMPREHEN METABOLIC PANEL: CPT

## 2024-10-14 NOTE — ASSESSMENT & PLAN NOTE
Impression: Patient cannot take NSAIDs as per renal.  Tramadol does help with her pain  Plan: Sign [facility] pain agreement  UDS  Tramadol 50 mg 1  tab 3 times a day as needed for severe pain

## 2024-10-15 LAB
ALBUMIN SERPL-MCNC: 4.5 G/DL (ref 3.5–5.2)
ALBUMIN/GLOB SERPL: 1.6 G/DL
ALP SERPL-CCNC: 68 U/L (ref 39–117)
ALT SERPL W P-5'-P-CCNC: 11 U/L (ref 1–33)
ANION GAP SERPL CALCULATED.3IONS-SCNC: 11.6 MMOL/L (ref 5–15)
AST SERPL-CCNC: 14 U/L (ref 1–32)
BILIRUB SERPL-MCNC: 0.4 MG/DL (ref 0–1.2)
BUN SERPL-MCNC: 10 MG/DL (ref 6–20)
BUN/CREAT SERPL: 14.1 (ref 7–25)
C3 SERPL-MCNC: 140 MG/DL (ref 82–167)
C4 SERPL-MCNC: 23 MG/DL (ref 14–44)
CALCIUM SPEC-SCNC: 9.6 MG/DL (ref 8.6–10.5)
CHLORIDE SERPL-SCNC: 104 MMOL/L (ref 98–107)
CO2 SERPL-SCNC: 22.4 MMOL/L (ref 22–29)
CREAT SERPL-MCNC: 0.71 MG/DL (ref 0.57–1)
EGFRCR SERPLBLD CKD-EPI 2021: 110.4 ML/MIN/1.73
GLOBULIN UR ELPH-MCNC: 2.9 GM/DL
GLUCOSE SERPL-MCNC: 94 MG/DL (ref 65–99)
POTASSIUM SERPL-SCNC: 4.3 MMOL/L (ref 3.5–5.2)
PROT SERPL-MCNC: 7.4 G/DL (ref 6–8.5)
SODIUM SERPL-SCNC: 138 MMOL/L (ref 136–145)

## 2024-10-16 ENCOUNTER — OFFICE VISIT (OUTPATIENT)
Age: 39
End: 2024-10-16
Payer: COMMERCIAL

## 2024-10-16 VITALS
BODY MASS INDEX: 38.19 KG/M2 | SYSTOLIC BLOOD PRESSURE: 136 MMHG | WEIGHT: 252 LBS | TEMPERATURE: 97.6 F | DIASTOLIC BLOOD PRESSURE: 84 MMHG | HEIGHT: 68 IN | HEART RATE: 95 BPM

## 2024-10-16 DIAGNOSIS — E55.9 VITAMIN D DEFICIENCY: ICD-10-CM

## 2024-10-16 DIAGNOSIS — M32.9 SYSTEMIC LUPUS ERYTHEMATOSUS, UNSPECIFIED SLE TYPE, UNSPECIFIED ORGAN INVOLVEMENT STATUS: ICD-10-CM

## 2024-10-16 DIAGNOSIS — I82.90 THROMBOSIS: ICD-10-CM

## 2024-10-16 DIAGNOSIS — R52 PAIN MANAGEMENT: ICD-10-CM

## 2024-10-16 DIAGNOSIS — Z79.899 HIGH RISK MEDICATION USE: Primary | ICD-10-CM

## 2024-10-16 RX ORDER — OMEPRAZOLE 40 MG/1
40 CAPSULE, DELAYED RELEASE ORAL DAILY
COMMUNITY
End: 2024-11-04

## 2024-10-16 RX ORDER — HYDROXYCHLOROQUINE SULFATE 200 MG/1
200 TABLET, FILM COATED ORAL 2 TIMES DAILY
Qty: 180 TABLET | Refills: 0 | Status: SHIPPED | OUTPATIENT
Start: 2024-10-16

## 2024-10-16 RX ORDER — TRAMADOL HYDROCHLORIDE 50 MG/1
50 TABLET ORAL EVERY 8 HOURS PRN
Qty: 270 TABLET | Refills: 0 | Status: SHIPPED | OUTPATIENT
Start: 2024-10-16

## 2024-10-16 NOTE — PROGRESS NOTES
[facility] [place] Up Visit     Office Follow Up      Date: 10/16/2024   Patient Name: Livia Benítez  MRN: [id number]  YOB: 1985    Referring Physician: Shivani Box AP*     Chief Complaint   Patient presents with    Follow-up       History of Present Illness: Livia Benítez is a 39 y.o. female who is here today for follow up on   History of Present Illness         Result Review :        Results            Subjective     Allergies   Allergen Reactions    Dilantin [Phenytoin] Other (See Comments)     Pt unsure         Current Outpatient Medications:     acetaminophen (Tylenol 8 Hour Arthritis Pain) 650 MG 8 hr tablet, Take 1 tablet by mouth Every 8 (Eight) Hours As Needed., Disp: , Rfl:     DULoxetine (CYMBALTA) 60 MG capsule, Take 1 capsule by mouth Daily., Disp: , Rfl:     hydroxychloroquine (PLAQUENIL) 200 MG tablet, Take 1 tablet by mouth 2 (Two) Times a Day., Disp: 180 tablet, Rfl: 0    lamoTRIgine (LaMICtal) 100 MG tablet, Take 1 tablet by mouth 2 (Two) Times a Day., Disp: , Rfl:     losartan (Cozaar) 50 MG tablet, Take 1 tablet by mouth Daily., Disp: 90 tablet, Rfl: 1    melatonin 5 MG tablet tablet, Take 1 tablet by mouth Every Night., Disp: , Rfl:     Nurtec 75 MG dispersible tablet, Take 1 tablet by mouth As Needed., Disp: , Rfl:     omeprazole (priLOSEC) 40 MG capsule, Take 1 capsule by mouth Daily., Disp: , Rfl:     pantoprazole (PROTONIX) 40 MG EC tablet, Take 1 tablet by mouth Daily., Disp: 90 tablet, Rfl: 1    promethazine (PHENERGAN) 25 MG tablet, Take 1 tablet by mouth 3 (Three) Times a Day As Needed for Nausea or Vomiting., Disp: , Rfl:     tiZANidine (ZANAFLEX) 4 MG tablet, Take 1 tablet by mouth Every 8 (Eight) Hours As Needed for Muscle Spasms. Take 1 tablet HS, Disp: 90 tablet, Rfl: 0    traMADol (ULTRAM) 50 MG tablet, Take 1 tablet by mouth Every 8 (Eight) Hours As Needed for Severe Pain., Disp: 270 tablet, Rfl: 0     Tranexamic Acid (Lysteda) 650 MG tablet, Take 2 tablets by mouth 3 times a day. During menses, Disp: , Rfl:     vitamin D3 125 MCG (5000 UT) capsule capsule, Take 1 capsule by mouth Daily., Disp: , Rfl:     Past Medical History:   Diagnosis Date    Abnormal Pap smear of cervix     Acute cerebrovascular accident     embolic infarct massive intracranial bleed IVC placed    Arthritis     Autoimmune disorder     Back pain     Blood clots in brain     2003  ALSO ONE IN LEG IN .    CTS (carpal tunnel syndrome) 2018    right hand by EMG/NCV    Dental bridge present     DVT, lower extremity      RLE    Dyspepsia     Fatigue     Isabella filter in place     SINCE     High risk medication use     History of RPR test     FTA ; s/p tx pcn    History of transfusion     History of unsteady gait     SINCE STROKE    HPV (human papilloma virus) infection     Hx of trichomoniasis 2016    Hypertension 2003    CHTN    IBS (irritable bowel syndrome)     Kidney stone     ,     Knee joint pain     Lumbar spine pain     Lupus 2002    Lupus (systemic lupus erythematosus)     Migraine     Nausea     Nephritis     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    Seizures     LAST ONE  CVA/bleed    Stroke     2003    Urinary tract infection     Vitamin D deficiency     Weight gain         Past Surgical History:   Procedure Laterality Date    ABDOMINAL SURGERY      CARPAL TUNNEL RELEASE Right 2023     SECTION WITH TUBAL Bilateral 2021    Procedure:  SECTION PRIMARY WITH TUBAL;  Surgeon: Claudine Taylor DO;  Location: [facility] LABOR DELIVERY;  Service: Obstetrics/Gynecology;  Laterality: Bilateral;    EXTRACORPOREAL SHOCK WAVE LITHOTRIPSY (ESWL) Left 2017    Procedure: EXTRACORPOREAL SHOCKWAVE LITHOTRIPSY, FLEXIBLE URETEROSCOPY;  Surgeon: Driss Wharton MD;  Location: [facility] OR;  Service:     GALLBLADDER SURGERY      HAND SURGERY Right     KIDNEY STONE SURGERY       2008, 2015    MOUTH SURGERY  2007    wisdom teeth    OTHER SURGICAL HISTORY  2003    IVC filter       Family History   Problem Relation Age of Onset    Diabetes Father     Hypertension Father     Thyroid disease Mother     Heart murmur Mother     Hypertension Brother     Breast cancer Maternal Aunt     Kidney disease Maternal Grandmother         Social History     Socioeconomic History    Marital status: Single   Tobacco Use    Smoking status: Never     Passive exposure: Past    Smokeless tobacco: Never   Vaping Use    Vaping status: Never Used   Substance and Sexual Activity    Alcohol use: Not Currently    Drug use: No    Sexual activity: Defer     Birth control/protection: None       Review of Systems   Constitutional:  Positive for fatigue and unexpected weight gain. Negative for fever.   HENT:  Negative for mouth sores, nosebleeds, swollen glands and trouble swallowing.    Eyes:  Positive for pain. Negative for blurred vision, double vision, photophobia and visual disturbance.   Respiratory:  Negative for apnea, cough, choking and shortness of breath.    Cardiovascular:  Negative for chest pain, palpitations and leg swelling.   Gastrointestinal:  Positive for constipation and nausea. Negative for abdominal pain, blood in stool, diarrhea, vomiting and GERD.        Abdominal cramping   Endocrine: Negative for cold intolerance, heat intolerance, polydipsia, polyphagia and polyuria.   Genitourinary:  Negative for difficulty urinating, dysuria, genital sores, hematuria and urinary incontinence.   Musculoskeletal:  Positive for arthralgias, back pain, neck pain and neck stiffness. Negative for gait problem, joint swelling, myalgias and bursitis.        Morning stiffness  Joint tenderness     Skin:  Negative for rash.   Allergic/Immunologic: Negative for environmental allergies and food allergies.   Neurological:  Positive for headache. Negative for dizziness, tremors, seizures, syncope, weakness, numbness, memory  "problem and confusion.   Hematological:  Negative for adenopathy. Does not bruise/bleed easily.   Psychiatric/Behavioral:  Negative for sleep disturbance, suicidal ideas, depressed mood and stress. The patient is not nervous/anxious.       I have reviewed and updated the patient's chief complaint, history of present illness, review of systems, past medical history, surgical history, family history, social history, medications and allergy list as appropriate.     Objective    Vital Signs:   Vitals:    10/16/24 1128   Pulse: 95   Temp: 97.6 °F (36.4 °C)   Weight: 114 kg (252 lb)   Height: 172.7 cm (68\")   PainSc:   8   PainLoc: Back  Comment: marylu Benítez reports a pain score of 8.  Given her pain assessment as noted, treatment options were discussed and the following options were decided upon as a follow-up plan to address the patient's pain: .      Body mass index is 38.32 kg/m².         Physical Exam   Physical Exam      Physical Exam  There is currently no information documented on the homunculus. Go to the Rheumatology activity and complete the homunculus joint exam.     Results Review:   Imaging Results (Last 24 Hours)       ** No results found for the last 24 hours. **            Procedures    Assessment / Plan    Assessment/Plan:   Diagnoses and all orders for this visit:    1. High risk medication use (Primary)  Assessment & Plan:  Plaquenil well tolerated and effective.  S/p Covid vaccine. S/p Bivalent.    Eye check q 6-9 mos to r/o toxicity- [facility] - due now        2. Pain management  Assessment & Plan:  Impression: Patient cannot take NSAIDs as per renal.  Tramadol does help with her pain  Plan: Sign [facility] pain agreement  UDS  Tramadol 50 mg 1  tab 3 times a day as needed for severe pain      3. Systemic lupus erythematosus, unspecified SLE type, unspecified organ involvement status  Assessment & Plan:  Lupus dx 2003: + KESHIA/DNA , low complements, DPGN, thrombocytopenia, s/p Cytoxan;  " appears stable; pt looking into BC--NO ESTROGEN (condom/spermicide)!!!   7/22 C3 and C4 normal, DsDna 16(9) - Not a crithidia dna.  Crithidia dna 1:20 in 4/23  She is clinically stable on plaquenil but her August Crithidia dna is 1:320 with normal complements.   8/23 CDNA 1:320  6/24 CDNA 1:40 with normal compliments.     Pain:  Her kidney doctor doesn't want her to take meds everyday so she has more pain.   Continue Plaquenil BID.  Continue tizanidine.  She has H/o seizures. Followed by DR. Cornelius. He did let her take tramadol as needed.  She sees Dr. Griffith now, Adryan retired.   She has difficulty finding something that helps with her joint pain, but Tramadol does help.   Can take Tylenol Arthritis 2 twice daily- No relief.  Seeing Dr. Hernandez in nephrology for eval of proteinuria in context of positive Crithidia dna.  Currently asymptomatic and off steroids.   Topical creams to elbows/ joints- Biofreeze, CBD lotion, UNKERS.  Repeat Lupus Labs before next apt.       4. Vitamin D deficiency  Assessment & Plan:  S/p prescription D.  29.7 in 9/2020 on 1000  D 28.5 in 12/22  44.6 in 1/24    Plan:  5000IU per day.      5. Thrombosis  Assessment & Plan:  IVC filter placed in 2003. At that time she had a RLE dvt AND EMBOLIC INFARCTS. WAS UNABLE TO BE ANTICOAGULATED TO DUE A SUBSEQUENT LARGE RIGHT INTRACRANIAL HEMORRHAGE. Most recently noted to have broken strut on CT scan with fragment embedded in L3 vertebral body.    Plan:  No longer taking low dose aspirin.   No clots.            Assessment & Plan          Follow Up:   No follow-ups on file.         Iwona Ring MD  [facility]     Encounter Administratively Closed by Health Information Management

## 2024-10-17 LAB — DSDNA AB SER QL CLIF: NEGATIVE

## 2024-11-04 DIAGNOSIS — I10 ESSENTIAL HYPERTENSION: Chronic | ICD-10-CM

## 2024-11-04 RX ORDER — DULOXETIN HYDROCHLORIDE 60 MG/1
60 CAPSULE, DELAYED RELEASE ORAL DAILY
Qty: 90 CAPSULE | Refills: 1 | Status: SHIPPED | OUTPATIENT
Start: 2024-11-04

## 2024-11-04 RX ORDER — PROMETHAZINE HYDROCHLORIDE 25 MG/1
TABLET ORAL
Qty: 90 TABLET | Refills: 1 | Status: SHIPPED | OUTPATIENT
Start: 2024-11-04

## 2024-11-04 RX ORDER — OMEPRAZOLE 40 MG/1
40 CAPSULE, DELAYED RELEASE ORAL DAILY
Qty: 90 CAPSULE | Refills: 1 | Status: SHIPPED | OUTPATIENT
Start: 2024-11-04

## 2025-01-27 ENCOUNTER — OFFICE VISIT (OUTPATIENT)
Dept: FAMILY MEDICINE CLINIC | Facility: CLINIC | Age: 40
End: 2025-01-27
Payer: COMMERCIAL

## 2025-01-27 VITALS
TEMPERATURE: 97.8 F | RESPIRATION RATE: 18 BRPM | DIASTOLIC BLOOD PRESSURE: 68 MMHG | SYSTOLIC BLOOD PRESSURE: 106 MMHG | OXYGEN SATURATION: 95 % | HEIGHT: 68 IN | WEIGHT: 258 LBS | HEART RATE: 102 BPM | BODY MASS INDEX: 39.1 KG/M2

## 2025-01-27 DIAGNOSIS — G43.E19 INTRACTABLE CHRONIC MIGRAINE WITH AURA AND WITHOUT STATUS MIGRAINOSUS: Chronic | ICD-10-CM

## 2025-01-27 DIAGNOSIS — K21.9 GASTROESOPHAGEAL REFLUX DISEASE, UNSPECIFIED WHETHER ESOPHAGITIS PRESENT: Chronic | ICD-10-CM

## 2025-01-27 DIAGNOSIS — I10 ESSENTIAL HYPERTENSION: Primary | ICD-10-CM

## 2025-01-27 DIAGNOSIS — Z23 NEED FOR INFLUENZA VACCINATION: ICD-10-CM

## 2025-01-27 DIAGNOSIS — Z13.220 SCREENING, LIPID: ICD-10-CM

## 2025-01-27 DIAGNOSIS — Z13.29 SCREENING FOR THYROID DISORDER: ICD-10-CM

## 2025-01-27 PROCEDURE — 3074F SYST BP LT 130 MM HG: CPT | Performed by: NURSE PRACTITIONER

## 2025-01-27 PROCEDURE — 90471 IMMUNIZATION ADMIN: CPT | Performed by: NURSE PRACTITIONER

## 2025-01-27 PROCEDURE — 1125F AMNT PAIN NOTED PAIN PRSNT: CPT | Performed by: NURSE PRACTITIONER

## 2025-01-27 PROCEDURE — 1159F MED LIST DOCD IN RCRD: CPT | Performed by: NURSE PRACTITIONER

## 2025-01-27 PROCEDURE — 99214 OFFICE O/P EST MOD 30 MIN: CPT | Performed by: NURSE PRACTITIONER

## 2025-01-27 PROCEDURE — 85025 COMPLETE CBC W/AUTO DIFF WBC: CPT | Performed by: NURSE PRACTITIONER

## 2025-01-27 PROCEDURE — 80053 COMPREHEN METABOLIC PANEL: CPT | Performed by: NURSE PRACTITIONER

## 2025-01-27 PROCEDURE — 90656 IIV3 VACC NO PRSV 0.5 ML IM: CPT | Performed by: NURSE PRACTITIONER

## 2025-01-27 PROCEDURE — 3078F DIAST BP <80 MM HG: CPT | Performed by: NURSE PRACTITIONER

## 2025-01-27 PROCEDURE — 80061 LIPID PANEL: CPT | Performed by: NURSE PRACTITIONER

## 2025-01-27 PROCEDURE — 1160F RVW MEDS BY RX/DR IN RCRD: CPT | Performed by: NURSE PRACTITIONER

## 2025-01-27 PROCEDURE — 84443 ASSAY THYROID STIM HORMONE: CPT | Performed by: NURSE PRACTITIONER

## 2025-01-27 RX ORDER — PANTOPRAZOLE SODIUM 40 MG/1
40 TABLET, DELAYED RELEASE ORAL DAILY
Qty: 90 TABLET | Refills: 1 | Status: SHIPPED | OUTPATIENT
Start: 2025-01-27

## 2025-01-27 RX ORDER — LOSARTAN POTASSIUM 50 MG/1
50 TABLET ORAL DAILY
Qty: 90 TABLET | Refills: 1 | Status: SHIPPED | OUTPATIENT
Start: 2025-01-27

## 2025-01-27 RX ORDER — PROPRANOLOL HCL 20 MG
20 TABLET ORAL 2 TIMES DAILY
Start: 2025-01-27

## 2025-01-27 RX ORDER — PROPRANOLOL HCL 20 MG
20 TABLET ORAL 3 TIMES DAILY
COMMUNITY
End: 2025-01-27 | Stop reason: SDUPTHER

## 2025-01-27 NOTE — PROGRESS NOTES
Medication reviewed and she stated she updated in her MyChart one new medication added  Allergies reviewed no new  Surgeries reviewed no new  Histories reviewed no changes   PHQ negative     Venipuncture Blood Specimen Collection  Venipuncture performed in right arm by Ivana Kumar MA with good hemostasis. Patient tolerated the procedure well without complications.   01/27/25   Ivana Kumar MA

## 2025-01-27 NOTE — PROGRESS NOTES
"Milind Benítez is a 39 y.o. female.     Chief Complaint   Patient presents with    Hypertension    Heartburn       History of Present Illness  She presents for follow up of essential hypertension and gerd. She reports good blood pressure control. She reports good gerd control. Janna has seen neurology for migraines and h/o seizures. Note reviewed. She has seen rheumatology for lupus. Note reviewed.   Hypertension  This is a recurrent problem. The current episode started more than 1 year ago. The problem has been stable since onset. The problem is controlled. Associated symptoms include blurred vision, headaches and malaise/fatigue. Pertinent negatives include no anxiety, chest pain, orthopnea, palpitations, peripheral edema or shortness of breath. Agents associated with hypertension include amphetamines and NSAIDs. Risk factors for coronary artery disease include obesity and sedentary lifestyle. Compliance problems include diet and exercise.         The following portions of the patient's history were reviewed and updated as appropriate: allergies, current medications, past family history, past medical history, past social history, past surgical history and problem list.    Review of Systems   Constitutional:  Positive for malaise/fatigue. Negative for fever.   Eyes:  Positive for blurred vision.   Respiratory:  Negative for cough, shortness of breath and wheezing.    Cardiovascular:  Negative for chest pain, palpitations and orthopnea.   Gastrointestinal:  Negative for diarrhea, nausea and vomiting.   Neurological:  Positive for headaches.       Objective     /68 (BP Location: Right arm, Patient Position: Sitting, Cuff Size: Large Adult)   Pulse 102   Temp 97.8 °F (36.6 °C) (Tympanic)   Resp 18   Ht 172.7 cm (67.99\")   Wt 117 kg (258 lb)   SpO2 95%   BMI 39.24 kg/m²     Physical Exam  Vitals reviewed.   Constitutional:       General: She is not in acute distress.     Appearance: Normal " appearance. She is well-developed. She is not diaphoretic.   HENT:      Head: Normocephalic and atraumatic.   Cardiovascular:      Rate and Rhythm: Regular rhythm. Tachycardia present.      Heart sounds: Normal heart sounds, S1 normal and S2 normal. No murmur heard.     No friction rub. No gallop.   Pulmonary:      Effort: Pulmonary effort is normal. No respiratory distress.      Breath sounds: Normal breath sounds.   Abdominal:      General: Bowel sounds are normal. There is no distension.      Palpations: Abdomen is soft.      Tenderness: There is no abdominal tenderness.   Skin:     General: Skin is warm and dry.   Neurological:      Mental Status: She is alert and oriented to person, place, and time.   Psychiatric:         Behavior: Behavior normal.         Thought Content: Thought content normal.         Judgment: Judgment normal.         Current Outpatient Medications   Medication Sig Dispense Refill    acetaminophen (Tylenol 8 Hour Arthritis Pain) 650 MG 8 hr tablet Take 1 tablet by mouth Every 8 (Eight) Hours As Needed.      DULoxetine (CYMBALTA) 60 MG capsule TAKE ONE CAPSULE BY MOUTH EVERY DAY 90 capsule 1    hydroxychloroquine (PLAQUENIL) 200 MG tablet Take 1 tablet by mouth 2 (Two) Times a Day. 180 tablet 0    lamoTRIgine (LaMICtal) 100 MG tablet Take 1 tablet by mouth 2 (Two) Times a Day.      losartan (Cozaar) 50 MG tablet Take 1 tablet by mouth Daily. 90 tablet 1    melatonin 5 MG tablet tablet Take 1 tablet by mouth Every Night.      Nurtec 75 MG dispersible tablet Take 1 tablet by mouth As Needed.      omeprazole (priLOSEC) 40 MG capsule TAKE ONE CAPSULE BY MOUTH EVERY DAY BEFORE a meal 90 capsule 1    pantoprazole (PROTONIX) 40 MG EC tablet Take 1 tablet by mouth Daily. 90 tablet 1    promethazine (PHENERGAN) 25 MG tablet TAKE 1 TABLET BY MOUTH THREE TIMES DAILY AS NEEDED FOR NAUSEA OR FOR VOMITING 90 tablet 1    propranolol (INDERAL) 20 MG tablet Take 1 tablet by mouth 2 (Two) Times a Day.       tiZANidine (ZANAFLEX) 4 MG tablet Take 1 tablet by mouth Every 8 (Eight) Hours As Needed for Muscle Spasms. Take 1 tablet HS 90 tablet 0    traMADol (ULTRAM) 50 MG tablet Take 1 tablet by mouth Every 8 (Eight) Hours As Needed for Severe Pain. 270 tablet 0    Tranexamic Acid (Lysteda) 650 MG tablet Take 2 tablets by mouth 3 times a day. During menses      vitamin D3 125 MCG (5000 UT) capsule capsule Take 1 capsule by mouth Daily.       No current facility-administered medications for this visit.            Assessment & Plan     Problem List Items Addressed This Visit       Essential hypertension - Primary    Relevant Medications    losartan (Cozaar) 50 MG tablet    propranolol (INDERAL) 20 MG tablet    Other Relevant Orders    CBC & Differential    Comprehensive Metabolic Panel    Migraine    Relevant Medications    propranolol (INDERAL) 20 MG tablet    Gastroesophageal reflux disease    Relevant Medications    pantoprazole (PROTONIX) 40 MG EC tablet     Other Visit Diagnoses       Need for influenza vaccination        Relevant Orders    Fluzone >6mos    Screening, lipid        Relevant Orders    Lipid Panel    Screening for thyroid disorder        Relevant Orders    TSH              ICD-10-CM ICD-9-CM   1. Essential hypertension  I10 401.9   2. Need for influenza vaccination  Z23 V04.81   3. Gastroesophageal reflux disease, unspecified whether esophagitis present  K21.9 530.81   4. Screening, lipid  Z13.220 V77.91   5. Screening for thyroid disorder  Z13.29 V77.0   6. Intractable chronic migraine with aura and without status migrainosus  G43.E19 346.01       Plan: Get labs today.  Continue current medications.  Flu vaccine ordered with her consent.  Follow-up in 6 months and as needed.    @Body mass index is 39.24 kg/m².           Understands disease processes and need for medications.  Understands reasons for urgent and emergent care.  Patient (& family) verbalized agreement for treatment plan.   Emotional support  and active listening provided.  Patient provided time to verbalize feelings.                  This document has been electronically signed by DANIEL Restrepo   January 27, 2025 13:23 EST

## 2025-01-28 LAB
ALBUMIN SERPL-MCNC: 4.4 G/DL (ref 3.5–5.2)
ALBUMIN/GLOB SERPL: 1.5 G/DL
ALP SERPL-CCNC: 72 U/L (ref 39–117)
ALT SERPL W P-5'-P-CCNC: 11 U/L (ref 1–33)
ANION GAP SERPL CALCULATED.3IONS-SCNC: 11.2 MMOL/L (ref 5–15)
AST SERPL-CCNC: 15 U/L (ref 1–32)
BASOPHILS # BLD AUTO: 0.02 10*3/MM3 (ref 0–0.2)
BASOPHILS NFR BLD AUTO: 0.3 % (ref 0–1.5)
BILIRUB SERPL-MCNC: 0.4 MG/DL (ref 0–1.2)
BUN SERPL-MCNC: 13 MG/DL (ref 6–20)
BUN/CREAT SERPL: 16 (ref 7–25)
CALCIUM SPEC-SCNC: 9.4 MG/DL (ref 8.6–10.5)
CHLORIDE SERPL-SCNC: 105 MMOL/L (ref 98–107)
CHOLEST SERPL-MCNC: 165 MG/DL (ref 0–200)
CO2 SERPL-SCNC: 23.8 MMOL/L (ref 22–29)
CREAT SERPL-MCNC: 0.81 MG/DL (ref 0.57–1)
DEPRECATED RDW RBC AUTO: 41.9 FL (ref 37–54)
EGFRCR SERPLBLD CKD-EPI 2021: 94.2 ML/MIN/1.73
EOSINOPHIL # BLD AUTO: 0.18 10*3/MM3 (ref 0–0.4)
EOSINOPHIL NFR BLD AUTO: 2.9 % (ref 0.3–6.2)
ERYTHROCYTE [DISTWIDTH] IN BLOOD BY AUTOMATED COUNT: 14.4 % (ref 12.3–15.4)
GLOBULIN UR ELPH-MCNC: 3 GM/DL
GLUCOSE SERPL-MCNC: 95 MG/DL (ref 65–99)
HCT VFR BLD AUTO: 40 % (ref 34–46.6)
HDLC SERPL-MCNC: 52 MG/DL (ref 40–60)
HGB BLD-MCNC: 13.2 G/DL (ref 12–15.9)
IMM GRANULOCYTES # BLD AUTO: 0.02 10*3/MM3 (ref 0–0.05)
IMM GRANULOCYTES NFR BLD AUTO: 0.3 % (ref 0–0.5)
LDLC SERPL CALC-MCNC: 79 MG/DL (ref 0–100)
LDLC/HDLC SERPL: 1.38 {RATIO}
LYMPHOCYTES # BLD AUTO: 2.26 10*3/MM3 (ref 0.7–3.1)
LYMPHOCYTES NFR BLD AUTO: 36.2 % (ref 19.6–45.3)
MCH RBC QN AUTO: 26.9 PG (ref 26.6–33)
MCHC RBC AUTO-ENTMCNC: 33 G/DL (ref 31.5–35.7)
MCV RBC AUTO: 81.5 FL (ref 79–97)
MONOCYTES # BLD AUTO: 0.55 10*3/MM3 (ref 0.1–0.9)
MONOCYTES NFR BLD AUTO: 8.8 % (ref 5–12)
NEUTROPHILS NFR BLD AUTO: 3.22 10*3/MM3 (ref 1.7–7)
NEUTROPHILS NFR BLD AUTO: 51.5 % (ref 42.7–76)
NRBC BLD AUTO-RTO: 0 /100 WBC (ref 0–0.2)
PLATELET # BLD AUTO: 261 10*3/MM3 (ref 140–450)
PMV BLD AUTO: 11.1 FL (ref 6–12)
POTASSIUM SERPL-SCNC: 4.3 MMOL/L (ref 3.5–5.2)
PROT SERPL-MCNC: 7.4 G/DL (ref 6–8.5)
RBC # BLD AUTO: 4.91 10*6/MM3 (ref 3.77–5.28)
SODIUM SERPL-SCNC: 140 MMOL/L (ref 136–145)
TRIGL SERPL-MCNC: 205 MG/DL (ref 0–150)
TSH SERPL DL<=0.05 MIU/L-ACNC: 1.31 UIU/ML (ref 0.27–4.2)
VLDLC SERPL-MCNC: 34 MG/DL (ref 5–40)
WBC NRBC COR # BLD AUTO: 6.25 10*3/MM3 (ref 3.4–10.8)

## 2025-01-28 NOTE — PROGRESS NOTES
Triglycerides are elevated.  Cut back on breads, sweets, carbs, and sugars.  Fish oil supplements can help this as well.  Thyroid level is normal.  Electrolytes, blood sugar, kidney, and liver functions are normal.  Blood counts are normal.

## 2025-02-03 ENCOUNTER — TELEPHONE (OUTPATIENT)
Age: 40
End: 2025-02-03
Payer: COMMERCIAL

## 2025-02-03 DIAGNOSIS — I82.90 THROMBOSIS: ICD-10-CM

## 2025-02-03 DIAGNOSIS — E55.9 VITAMIN D DEFICIENCY: ICD-10-CM

## 2025-02-03 DIAGNOSIS — Z79.899 HIGH RISK MEDICATION USE: Primary | ICD-10-CM

## 2025-02-03 DIAGNOSIS — R52 PAIN MANAGEMENT: ICD-10-CM

## 2025-02-03 DIAGNOSIS — I10 ESSENTIAL HYPERTENSION: ICD-10-CM

## 2025-02-03 DIAGNOSIS — M32.9 SYSTEMIC LUPUS ERYTHEMATOSUS, UNSPECIFIED SLE TYPE, UNSPECIFIED ORGAN INVOLVEMENT STATUS: ICD-10-CM

## 2025-02-03 NOTE — TELEPHONE ENCOUNTER
Caller: Livia Benítez    Relationship: Self    Best call back number: [phone]         Who are you requesting to speak with (clinical staff, provider,  specific staff member): CLINICAL STAFF      What was the call regarding: PATIENT CALLED TO RESCHEDULE APPOINTMENT FROM DR. LY ON 2/25. PATIENT WAS RESCHEDULED TO DR. KIM ON 6/16. PATIENT HAS STANDING ORDERS FOR LABS FROM DR. LY AND WONDERED WHEN THOSE SHOULD BE DONE OF IF NEW ORDERS WERE  NEEDED. PLEASE CONTACT PATIENT AND ADVISE.

## 2025-04-29 ENCOUNTER — LAB (OUTPATIENT)
Dept: LAB | Facility: HOSPITAL | Age: 40
End: 2025-04-29
Payer: COMMERCIAL

## 2025-04-29 ENCOUNTER — TRANSCRIBE ORDERS (OUTPATIENT)
Dept: ADMINISTRATIVE | Facility: HOSPITAL | Age: 40
End: 2025-04-29
Payer: COMMERCIAL

## 2025-04-29 DIAGNOSIS — N28.9 URETERAL SLUDGE: ICD-10-CM

## 2025-04-29 DIAGNOSIS — N28.9 URETERAL SLUDGE: Primary | ICD-10-CM

## 2025-04-29 PROCEDURE — 80069 RENAL FUNCTION PANEL: CPT

## 2025-04-29 PROCEDURE — 82570 ASSAY OF URINE CREATININE: CPT

## 2025-04-29 PROCEDURE — 85025 COMPLETE CBC W/AUTO DIFF WBC: CPT

## 2025-04-29 PROCEDURE — 84156 ASSAY OF PROTEIN URINE: CPT

## 2025-04-29 PROCEDURE — 36415 COLL VENOUS BLD VENIPUNCTURE: CPT

## 2025-04-29 PROCEDURE — 81001 URINALYSIS AUTO W/SCOPE: CPT

## 2025-04-30 ENCOUNTER — TELEPHONE (OUTPATIENT)
Age: 40
End: 2025-04-30
Payer: COMMERCIAL

## 2025-04-30 LAB
ALBUMIN SERPL-MCNC: 4.3 G/DL (ref 3.5–5.2)
ANION GAP SERPL CALCULATED.3IONS-SCNC: 10.2 MMOL/L (ref 5–15)
BACTERIA UR QL AUTO: ABNORMAL /HPF
BASOPHILS # BLD AUTO: 0.03 10*3/MM3 (ref 0–0.2)
BASOPHILS NFR BLD AUTO: 0.5 % (ref 0–1.5)
BILIRUB UR QL STRIP: NEGATIVE
BUN SERPL-MCNC: 11 MG/DL (ref 6–20)
BUN/CREAT SERPL: 17.2 (ref 7–25)
CALCIUM SPEC-SCNC: 9.8 MG/DL (ref 8.6–10.5)
CHLORIDE SERPL-SCNC: 105 MMOL/L (ref 98–107)
CLARITY UR: CLEAR
CO2 SERPL-SCNC: 23.8 MMOL/L (ref 22–29)
COLOR UR: YELLOW
CREAT SERPL-MCNC: 0.64 MG/DL (ref 0.57–1)
CREAT UR-MCNC: 151.3 MG/DL
DEPRECATED RDW RBC AUTO: 41.7 FL (ref 37–54)
EGFRCR SERPLBLD CKD-EPI 2021: 114 ML/MIN/1.73
EOSINOPHIL # BLD AUTO: 0.24 10*3/MM3 (ref 0–0.4)
EOSINOPHIL NFR BLD AUTO: 3.9 % (ref 0.3–6.2)
ERYTHROCYTE [DISTWIDTH] IN BLOOD BY AUTOMATED COUNT: 13.9 % (ref 12.3–15.4)
GLUCOSE SERPL-MCNC: 86 MG/DL (ref 65–99)
GLUCOSE UR STRIP-MCNC: NEGATIVE MG/DL
HCT VFR BLD AUTO: 42.4 % (ref 34–46.6)
HGB BLD-MCNC: 13.6 G/DL (ref 12–15.9)
HGB UR QL STRIP.AUTO: NEGATIVE
HYALINE CASTS UR QL AUTO: ABNORMAL /LPF
IMM GRANULOCYTES # BLD AUTO: 0.01 10*3/MM3 (ref 0–0.05)
IMM GRANULOCYTES NFR BLD AUTO: 0.2 % (ref 0–0.5)
KETONES UR QL STRIP: NEGATIVE
LEUKOCYTE ESTERASE UR QL STRIP.AUTO: NEGATIVE
LYMPHOCYTES # BLD AUTO: 2.11 10*3/MM3 (ref 0.7–3.1)
LYMPHOCYTES NFR BLD AUTO: 34.2 % (ref 19.6–45.3)
MCH RBC QN AUTO: 26.6 PG (ref 26.6–33)
MCHC RBC AUTO-ENTMCNC: 32.1 G/DL (ref 31.5–35.7)
MCV RBC AUTO: 82.8 FL (ref 79–97)
MONOCYTES # BLD AUTO: 0.48 10*3/MM3 (ref 0.1–0.9)
MONOCYTES NFR BLD AUTO: 7.8 % (ref 5–12)
NEUTROPHILS NFR BLD AUTO: 3.3 10*3/MM3 (ref 1.7–7)
NEUTROPHILS NFR BLD AUTO: 53.4 % (ref 42.7–76)
NITRITE UR QL STRIP: NEGATIVE
NRBC BLD AUTO-RTO: 0 /100 WBC (ref 0–0.2)
PH UR STRIP.AUTO: 7 [PH] (ref 5–8)
PHOSPHATE SERPL-MCNC: 3.1 MG/DL (ref 2.5–4.5)
PLATELET # BLD AUTO: 263 10*3/MM3 (ref 140–450)
PMV BLD AUTO: 10.8 FL (ref 6–12)
POTASSIUM SERPL-SCNC: 4.6 MMOL/L (ref 3.5–5.2)
PROT ?TM UR-MCNC: 16.1 MG/DL
PROT UR QL STRIP: ABNORMAL
RBC # BLD AUTO: 5.12 10*6/MM3 (ref 3.77–5.28)
RBC # UR STRIP: ABNORMAL /HPF
REF LAB TEST METHOD: ABNORMAL
SODIUM SERPL-SCNC: 139 MMOL/L (ref 136–145)
SP GR UR STRIP: 1.02 (ref 1–1.03)
SQUAMOUS #/AREA URNS HPF: ABNORMAL /HPF
UROBILINOGEN UR QL STRIP: ABNORMAL
WBC # UR STRIP: ABNORMAL /HPF
WBC NRBC COR # BLD AUTO: 6.17 10*3/MM3 (ref 3.4–10.8)

## 2025-04-30 RX ORDER — PROMETHAZINE HYDROCHLORIDE 25 MG/1
25 TABLET ORAL 3 TIMES DAILY PRN
Qty: 90 TABLET | Refills: 0 | Status: SHIPPED | OUTPATIENT
Start: 2025-04-30

## 2025-05-01 DIAGNOSIS — Z79.899 HIGH RISK MEDICATION USE: ICD-10-CM

## 2025-05-01 DIAGNOSIS — I10 ESSENTIAL HYPERTENSION: Chronic | ICD-10-CM

## 2025-05-01 DIAGNOSIS — R52 PAIN MANAGEMENT: ICD-10-CM

## 2025-05-01 RX ORDER — TRAMADOL HYDROCHLORIDE 50 MG/1
50 TABLET ORAL EVERY 8 HOURS PRN
Qty: 270 TABLET | Refills: 0 | Status: SHIPPED | OUTPATIENT
Start: 2025-05-01

## 2025-05-01 RX ORDER — HYDROXYCHLOROQUINE SULFATE 200 MG/1
200 TABLET, FILM COATED ORAL 2 TIMES DAILY
Qty: 180 TABLET | Refills: 0 | Status: SHIPPED | OUTPATIENT
Start: 2025-05-01

## 2025-05-01 RX ORDER — OMEPRAZOLE 40 MG/1
40 CAPSULE, DELAYED RELEASE ORAL DAILY
Qty: 90 CAPSULE | Refills: 1 | Status: SHIPPED | OUTPATIENT
Start: 2025-05-01

## 2025-05-01 RX ORDER — DULOXETIN HYDROCHLORIDE 60 MG/1
60 CAPSULE, DELAYED RELEASE ORAL DAILY
Qty: 90 CAPSULE | Refills: 1 | Status: SHIPPED | OUTPATIENT
Start: 2025-05-01

## 2025-05-06 ENCOUNTER — TRANSCRIBE ORDERS (OUTPATIENT)
Dept: ADMINISTRATIVE | Facility: HOSPITAL | Age: 40
End: 2025-05-06
Payer: COMMERCIAL

## 2025-05-06 DIAGNOSIS — Z12.31 SCREENING MAMMOGRAM FOR BREAST CANCER: Primary | ICD-10-CM

## 2025-06-02 ENCOUNTER — HOSPITAL ENCOUNTER (OUTPATIENT)
Dept: MAMMOGRAPHY | Facility: HOSPITAL | Age: 40
Discharge: HOME | End: 2025-06-02
Admitting: NURSE PRACTITIONER
Payer: COMMERCIAL

## 2025-06-02 DIAGNOSIS — Z12.31 SCREENING MAMMOGRAM FOR BREAST CANCER: ICD-10-CM

## 2025-06-02 PROCEDURE — 77063 BREAST TOMOSYNTHESIS BI: CPT | Performed by: RADIOLOGY

## 2025-06-02 PROCEDURE — 77067 SCR MAMMO BI INCL CAD: CPT | Performed by: RADIOLOGY

## 2025-06-02 PROCEDURE — 77067 SCR MAMMO BI INCL CAD: CPT

## 2025-06-02 PROCEDURE — 77063 BREAST TOMOSYNTHESIS BI: CPT

## 2025-06-09 ENCOUNTER — LAB (OUTPATIENT)
Dept: LAB | Facility: HOSPITAL | Age: 40
End: 2025-06-09
Payer: COMMERCIAL

## 2025-06-09 DIAGNOSIS — Z79.899 HIGH RISK MEDICATION USE: ICD-10-CM

## 2025-06-09 DIAGNOSIS — E55.9 VITAMIN D DEFICIENCY: ICD-10-CM

## 2025-06-09 DIAGNOSIS — R52 PAIN MANAGEMENT: ICD-10-CM

## 2025-06-09 DIAGNOSIS — I82.90 THROMBOSIS: ICD-10-CM

## 2025-06-09 DIAGNOSIS — M32.9 SYSTEMIC LUPUS ERYTHEMATOSUS, UNSPECIFIED SLE TYPE, UNSPECIFIED ORGAN INVOLVEMENT STATUS: ICD-10-CM

## 2025-06-09 DIAGNOSIS — I10 ESSENTIAL HYPERTENSION: ICD-10-CM

## 2025-06-09 PROCEDURE — 80053 COMPREHEN METABOLIC PANEL: CPT

## 2025-06-09 PROCEDURE — 85652 RBC SED RATE AUTOMATED: CPT

## 2025-06-09 PROCEDURE — 85025 COMPLETE CBC W/AUTO DIFF WBC: CPT

## 2025-06-09 PROCEDURE — 36415 COLL VENOUS BLD VENIPUNCTURE: CPT

## 2025-06-09 PROCEDURE — 86140 C-REACTIVE PROTEIN: CPT

## 2025-06-10 LAB
ALBUMIN SERPL-MCNC: 4.5 G/DL (ref 3.5–5.2)
ALBUMIN/GLOB SERPL: 1.7 G/DL
ALP SERPL-CCNC: 68 U/L (ref 39–117)
ALT SERPL W P-5'-P-CCNC: 13 U/L (ref 1–33)
ANION GAP SERPL CALCULATED.3IONS-SCNC: 11.3 MMOL/L (ref 5–15)
AST SERPL-CCNC: 16 U/L (ref 1–32)
BASOPHILS # BLD AUTO: 0.02 10*3/MM3 (ref 0–0.2)
BASOPHILS NFR BLD AUTO: 0.4 % (ref 0–1.5)
BILIRUB SERPL-MCNC: 0.3 MG/DL (ref 0–1.2)
BUN SERPL-MCNC: 12 MG/DL (ref 6–20)
BUN/CREAT SERPL: 16.2 (ref 7–25)
CALCIUM SPEC-SCNC: 9.4 MG/DL (ref 8.6–10.5)
CHLORIDE SERPL-SCNC: 104 MMOL/L (ref 98–107)
CO2 SERPL-SCNC: 21.7 MMOL/L (ref 22–29)
CREAT SERPL-MCNC: 0.74 MG/DL (ref 0.57–1)
CRP SERPL-MCNC: <0.3 MG/DL (ref 0–0.5)
DEPRECATED RDW RBC AUTO: 42 FL (ref 37–54)
EGFRCR SERPLBLD CKD-EPI 2021: 104.4 ML/MIN/1.73
EOSINOPHIL # BLD AUTO: 0.19 10*3/MM3 (ref 0–0.4)
EOSINOPHIL NFR BLD AUTO: 3.8 % (ref 0.3–6.2)
ERYTHROCYTE [DISTWIDTH] IN BLOOD BY AUTOMATED COUNT: 14.2 % (ref 12.3–15.4)
ERYTHROCYTE [SEDIMENTATION RATE] IN BLOOD: 13 MM/HR (ref 0–20)
GLOBULIN UR ELPH-MCNC: 2.7 GM/DL
GLUCOSE SERPL-MCNC: 102 MG/DL (ref 65–99)
HCT VFR BLD AUTO: 39.9 % (ref 34–46.6)
HGB BLD-MCNC: 13.1 G/DL (ref 12–15.9)
IMM GRANULOCYTES # BLD AUTO: 0.01 10*3/MM3 (ref 0–0.05)
IMM GRANULOCYTES NFR BLD AUTO: 0.2 % (ref 0–0.5)
LYMPHOCYTES # BLD AUTO: 2.1 10*3/MM3 (ref 0.7–3.1)
LYMPHOCYTES NFR BLD AUTO: 41.8 % (ref 19.6–45.3)
MCH RBC QN AUTO: 27.2 PG (ref 26.6–33)
MCHC RBC AUTO-ENTMCNC: 32.8 G/DL (ref 31.5–35.7)
MCV RBC AUTO: 83 FL (ref 79–97)
MONOCYTES # BLD AUTO: 0.38 10*3/MM3 (ref 0.1–0.9)
MONOCYTES NFR BLD AUTO: 7.6 % (ref 5–12)
NEUTROPHILS NFR BLD AUTO: 2.32 10*3/MM3 (ref 1.7–7)
NEUTROPHILS NFR BLD AUTO: 46.2 % (ref 42.7–76)
NRBC BLD AUTO-RTO: 0 /100 WBC (ref 0–0.2)
PLATELET # BLD AUTO: 234 10*3/MM3 (ref 140–450)
PMV BLD AUTO: 11 FL (ref 6–12)
POTASSIUM SERPL-SCNC: 4.2 MMOL/L (ref 3.5–5.2)
PROT SERPL-MCNC: 7.2 G/DL (ref 6–8.5)
RBC # BLD AUTO: 4.81 10*6/MM3 (ref 3.77–5.28)
SODIUM SERPL-SCNC: 137 MMOL/L (ref 136–145)
WBC NRBC COR # BLD AUTO: 5.02 10*3/MM3 (ref 3.4–10.8)

## 2025-06-16 ENCOUNTER — TELEPHONE (OUTPATIENT)
Age: 40
End: 2025-06-16

## 2025-06-16 ENCOUNTER — OFFICE VISIT (OUTPATIENT)
Age: 40
End: 2025-06-16
Payer: COMMERCIAL

## 2025-06-16 VITALS
HEIGHT: 68 IN | BODY MASS INDEX: 40.5 KG/M2 | DIASTOLIC BLOOD PRESSURE: 72 MMHG | SYSTOLIC BLOOD PRESSURE: 116 MMHG | HEART RATE: 82 BPM | TEMPERATURE: 97.2 F | WEIGHT: 267.2 LBS

## 2025-06-16 DIAGNOSIS — Z79.899 HIGH RISK MEDICATION USE: ICD-10-CM

## 2025-06-16 DIAGNOSIS — M32.9 SYSTEMIC LUPUS ERYTHEMATOSUS, UNSPECIFIED SLE TYPE, UNSPECIFIED ORGAN INVOLVEMENT STATUS: Primary | ICD-10-CM

## 2025-06-16 DIAGNOSIS — R52 PAIN MANAGEMENT: ICD-10-CM

## 2025-06-16 DIAGNOSIS — I10 ESSENTIAL HYPERTENSION: Chronic | ICD-10-CM

## 2025-06-16 PROCEDURE — 1159F MED LIST DOCD IN RCRD: CPT | Performed by: INTERNAL MEDICINE

## 2025-06-16 PROCEDURE — 1160F RVW MEDS BY RX/DR IN RCRD: CPT | Performed by: INTERNAL MEDICINE

## 2025-06-16 PROCEDURE — 3074F SYST BP LT 130 MM HG: CPT | Performed by: INTERNAL MEDICINE

## 2025-06-16 PROCEDURE — 3078F DIAST BP <80 MM HG: CPT | Performed by: INTERNAL MEDICINE

## 2025-06-16 PROCEDURE — 99214 OFFICE O/P EST MOD 30 MIN: CPT | Performed by: INTERNAL MEDICINE

## 2025-06-16 RX ORDER — OMEPRAZOLE 40 MG/1
40 CAPSULE, DELAYED RELEASE ORAL DAILY
Qty: 90 CAPSULE | Refills: 1 | Status: SHIPPED | OUTPATIENT
Start: 2025-06-16

## 2025-06-16 RX ORDER — TRAMADOL HYDROCHLORIDE 50 MG/1
50 TABLET ORAL EVERY 8 HOURS PRN
Qty: 270 TABLET | Refills: 0 | Status: SHIPPED | OUTPATIENT
Start: 2025-06-16

## 2025-06-16 RX ORDER — HYDROXYCHLOROQUINE SULFATE 200 MG/1
200 TABLET, FILM COATED ORAL 2 TIMES DAILY
Qty: 180 TABLET | Refills: 0 | Status: SHIPPED | OUTPATIENT
Start: 2025-06-16

## 2025-06-16 RX ORDER — DULOXETIN HYDROCHLORIDE 60 MG/1
60 CAPSULE, DELAYED RELEASE ORAL DAILY
Qty: 90 CAPSULE | Refills: 1 | Status: SHIPPED | OUTPATIENT
Start: 2025-06-16

## 2025-06-16 RX ORDER — PROMETHAZINE HYDROCHLORIDE 25 MG/1
25 TABLET ORAL 3 TIMES DAILY PRN
Qty: 90 TABLET | Refills: 0 | Status: SHIPPED | OUTPATIENT
Start: 2025-06-16

## 2025-06-16 NOTE — PROGRESS NOTES
Office Follow Up      Date: 06/16/2025   Patient Name: Livia Benítez  MRN: [id number]  YOB: 1985    Referring Physician: No ref. provider found     Chief Complaint:   Chief Complaint   Patient presents with    Lupus       History of Present Illness: Livia Benítez is a 40 y.o. female who is here today for follow up on Lupus.     Her pain is 7/10 in her knees and hands.   She has knee and hand pain, her right hand is worse.  Her cheeks have been red lately.  She thinks it may be rosacea.    Denies photosensitive rash, oronasal ulcers, Pleurisy, Sicca, Raynaud's, swollen joints    History of Present Illness        Subjective       Review of Systems: Review of Systems   Constitutional:  Positive for unexpected weight gain. Negative for chills, fatigue, fever and unexpected weight loss.   HENT:  Negative for mouth sores, sinus pressure and sore throat.    Eyes:  Positive for blurred vision. Negative for pain and redness.   Respiratory:  Negative for cough and shortness of breath.    Cardiovascular:  Negative for chest pain.   Gastrointestinal:  Positive for constipation and nausea. Negative for abdominal pain, blood in stool, diarrhea, vomiting and GERD.   Endocrine: Negative for polydipsia and polyuria.   Genitourinary:  Negative for dysuria, genital sores and hematuria.   Musculoskeletal:  Positive for arthralgias, back pain, neck pain and neck stiffness. Negative for joint swelling and myalgias.   Skin:  Positive for rash. Negative for bruise.   Neurological:  Positive for dizziness and headache. Negative for seizures, weakness, numbness and memory problem.   Hematological:  Negative for adenopathy. Does not bruise/bleed easily.   Psychiatric/Behavioral:  Negative for depressed mood. The patient is not nervous/anxious.         Past Medical History:   Past Medical History:   Diagnosis Date    Abnormal Pap smear of cervix     Acute cerebrovascular accident 2003    embolic infarct  massive intracranial bleed IVC placed    Arthritis     Autoimmune disorder     Back pain     Blood clots in brain     2003  ALSO ONE IN LEG IN .    CTS (carpal tunnel syndrome) 2018    right hand by EMG/NCV    Dental bridge present     DVT, lower extremity      RLE    Dyspepsia     Fatigue     Isabella filter in place     SINCE     High risk medication use     History of RPR test     FTA ; s/p tx pcn    History of transfusion     History of unsteady gait     SINCE STROKE    HPV (human papilloma virus) infection     Hx of trichomoniasis 2016    Hypertension 2003    CHTN    IBS (irritable bowel syndrome)     Kidney stone     ,     Knee joint pain     Lumbar spine pain     Lupus 2002    Lupus (systemic lupus erythematosus)     Migraine     Nausea     Nephritis     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 LABOR DELIVERY;  Service: Obstetrics/Gynecology;  Laterality: Bilateral;    EXTRACORPOREAL SHOCK WAVE LITHOTRIPSY (ESWL) Left 2017    Procedure: EXTRACORPOREAL SHOCKWAVE LITHOTRIPSY, FLEXIBLE URETEROSCOPY;  Surgeon: Driss Wharton MD;  Location: [facility] OR;  Service:     GALLBLADDER SURGERY  2008    HAND SURGERY Right     KIDNEY STONE SURGERY      ,     MOUTH SURGERY  2007    wisdom teeth    OTHER SURGICAL HISTORY      IVC filter       Family History:   Family History   Problem Relation Age of Onset    Diabetes Father     Hypertension Father     Thyroid disease Mother     Heart murmur Mother     Hypertension Brother     Breast cancer  Maternal Aunt     Kidney disease Maternal Grandmother        Social History:   Social History     Socioeconomic History    Marital status: Single   Tobacco Use    Smoking status: Never     Passive exposure: Past    Smokeless tobacco: Never   Vaping Use    Vaping status: Never Used   Substance and Sexual Activity    Alcohol use: Not Currently    Drug use: No    Sexual activity: Defer     Birth control/protection: None       Medications:   Current Outpatient Medications:     acetaminophen (Tylenol 8 Hour Arthritis Pain) 650 MG 8 hr tablet, Take 1 tablet by mouth Every 8 (Eight) Hours As Needed., Disp: , Rfl:     DULoxetine (CYMBALTA) 60 MG capsule, Take 1 capsule by mouth Daily., Disp: 90 capsule, Rfl: 1    hydroxychloroquine (PLAQUENIL) 200 MG tablet, Take 1 tablet by mouth 2 (Two) Times a Day., Disp: 180 tablet, Rfl: 0    lamoTRIgine (LaMICtal) 100 MG tablet, Take 1 tablet by mouth 2 (Two) Times a Day., Disp: , Rfl:     losartan (Cozaar) 50 MG tablet, Take 1 tablet by mouth Daily., Disp: 90 tablet, Rfl: 1    melatonin 5 MG tablet tablet, Take 1 tablet by mouth Every Night., Disp: , Rfl:     Nurtec 75 MG dispersible tablet, Take 1 tablet by mouth As Needed., Disp: , Rfl:     Omega-3 Fatty Acids (Fish Oil) 600 MG capsule, Take 2 capsules by mouth Daily., Disp: , Rfl:     omeprazole (priLOSEC) 40 MG capsule, Take 1 capsule by mouth Daily. before a meal, Disp: 90 capsule, Rfl: 1    pantoprazole (PROTONIX) 40 MG EC tablet, Take 1 tablet by mouth Daily., Disp: 90 tablet, Rfl: 1    promethazine (PHENERGAN) 25 MG tablet, Take 1 tablet by mouth 3 (Three) Times a Day As Needed for Nausea or Vomiting., Disp: 90 tablet, Rfl: 0    propranolol (INDERAL) 20 MG tablet, Take 1 tablet by mouth 2 (Two) Times a Day., Disp: , Rfl:     tiZANidine (ZANAFLEX) 4 MG tablet, Take 1 tablet by mouth Every 8 (Eight) Hours As Needed for Muscle Spasms., Disp: 90 tablet, Rfl: 0    traMADol (ULTRAM) 50 MG tablet, Take 1 tablet by mouth Every 8  "(Eight) Hours As Needed for Severe Pain., Disp: 270 tablet, Rfl: 0    Tranexamic Acid (Lysteda) 650 MG tablet, Take 2 tablets by mouth 3 times a day. During menses, Disp: , Rfl:     vitamin D3 125 MCG (5000 UT) capsule capsule, Take 1 capsule by mouth Daily., Disp: , Rfl:     Allergies:   Allergies   Allergen Reactions    Dilantin [Phenytoin] Other (See Comments)     Pt unsure           Objective        Vital Signs:   Vitals:    06/16/25 1326   BP: 116/72   BP Location: Left arm   Patient Position: Sitting   Cuff Size: Large Adult   Pulse: 82   Temp: 97.2 °F (36.2 °C)   Weight: 121 kg (267 lb 3.2 oz)   Height: 172.7 cm (68\")   PainSc: 7      Body mass index is 40.63 kg/m².      Physical Exam:  Physical Exam   MUSCULOSKELETAL:   No peripheral synovitis.  No dactylitis.  No pitting of the nails.  No rheumatoid nodules or tophi.    Complete joint exam was performed including the MCPs, PIPs, DIPs of the hands, wrists, elbows, shoulders, hips, knees and ankles.  No soft tissue swelling or tenderness is present except as above.    General: The patient is well-developed and well nourished. Cooperative, alert and oriented. Affect is normal. Hydration appears normal.   HEENT: Normocephalic and atraumatic. Lids and conjunctiva are normal. Pupils are equal and sclera are clear. Oropharynx is clear   NECK neck is supple without adenopathy, masses or thyromegaly.   CARDIOVASCULAR: Regular rate and rhythm. No murmurs, rubs or gallops   LUNGS: Effort is normal. Lungs are clear bilateral   ABDOMEN: Not examined  EXTREMITIES: Peripheral pulses are intact. No clubbing.   SKIN: No discoid or malar rash of lupus.  Positive telangiectasia cheeks consistent with rosacea   no rashes. No subcutaneous nodules. No digital ulcers. No sclerodactyly.   NEUROLOGIC: Gait is normal. Strength testing is normal.  No focal neurologic deficits    Results Review:   Labs:   Lab Results   Component Value Date    GLUCOSE 102 (H) 06/09/2025    BUN 12.0 " "06/09/2025    CREATININE 0.74 06/09/2025    EGFR 104.4 06/09/2025    BCR 16.2 06/09/2025    K 4.2 06/09/2025    CO2 21.7 (L) 06/09/2025    CALCIUM 9.4 06/09/2025    ALBUMIN 4.5 06/09/2025    BILITOT 0.3 06/09/2025    AST 16 06/09/2025    ALT 13 06/09/2025     Lab Results   Component Value Date    WBC 5.02 06/09/2025    HGB 13.1 06/09/2025    HCT 39.9 06/09/2025    MCV 83.0 06/09/2025     06/09/2025     Lab Results   Component Value Date    SEDRATE 13 06/09/2025     Lab Results   Component Value Date    CRP <0.30 06/09/2025     No results found for: \"QUANTIFERO\", \"QUANTITB1\", \"QUANTITB2\", \"QUANTIFERN\", \"QUANTIFERM\", \"QUANTITBGLDP\"  No results found for: \"RF\"  Lab Results   Component Value Date    HEPBSAG Negative 06/01/2021    HEPAIGM NonReactive 03/22/2015    HEPBIGMCORE NonReactive 03/22/2015    HEPCVIRUSABY NonReactive 03/22/2015         Procedures    Assessment / Plan      - Systemic lupus erythematosus  Assessment & Plan:  Lupus dx 2003: + KESHIA/dsDNA , low complements, DPGN, thrombocytopenia, s/p Cytoxan  7/22 C3 and C4 normal, +DsDna 16(9) - Not a crithidia dna.  +Crithidia dna 1:20 in 4/23 8/23 +CDNA 1:320  6/24 + CDNA 1:40 with normal compliments.     Current Rx: Hydroxychloroquine since 2003, duloxetine, tramadol, tizanidine, omeprazole, promethazine  Avoids NSAIDs with history of kidney disease followed by nephrology       Overall low disease activity from lupus standpoint.  No objective evidence of lupus disease activity  Cheeks with telangiectasia consistent with rosacea today.  Handout provided on rosacea.  Discussed dermatology referral, but she declines presently    Her kidney doctor doesn't want her to take NSAIDs  Seeing Dr. Hernandez in nephrology for eval of proteinuria in context of positive Crithidia dna.  Continue Plaquenil BID.  Continue tizanidine.  She has H/o seizures/headache followed by neurology Dr. Griffith. He did let her take tramadol as needed.    She has difficulty finding something " that helps with her joint pain, but Tramadol does help.   Can take Tylenol Arthritis 2 twice daily- No relief.  Topical creams to elbows/ joints- Biofreeze, CBD lotion, UNKERS.  Labs reviewed from 6/25 are stable  Repeat Lupus labs as below before next apt. in 6-months at [facility]  Request latest records from nephrology         - History of thrombosis  Assessment & Plan:  IVC filter placed in 2003. At that time she had a RLE dvt AND EMBOLIC INFARCTS. WAS UNABLE TO BE ANTICOAGULATED TO DUE A SUBSEQUENT LARGE RIGHT INTRACRANIAL HEMORRHAGE.   Most recently noted to have broken strut on CT scan with fragment embedded in L3 vertebral body.     Plan:  No longer taking low dose aspirin.   No new clots.  Continue hydroxychloroquine        - High risk medication use  Assessment & Plan:  Plaquenil well tolerated and effective.  S/p Covid vaccine. S/p Bivalent.     Eye check q 6-9 mos to r/o toxicity- retina associates   We discussed possible side effects of hydroxychloroquine including headache, nausea, diarrhea, rare retinal pigmentation, rare neuromuscular toxicity.  Recommend eye exams every 6 to 12 months to monitor for retinal toxicity.         - Pain management    Patient cannot take NSAIDs as per nephrology  Tramadol does help with her pain    Reviewed and signed [facility] pain agreement 6/11/24  UDS intermittently for monitoring  Tramadol 50 mg 1  tab 3 times a day as needed for severe pain    Risks and benefits of opiate therapy discussed in detail with the patient. These included but are not limited to potential problems with physical dependence/withdraw, addiction potential, tolerance, impaired decision making, balance and thinking problems that make it unsafe to drive a vehicle or operate dangerous machinery under the influence of opiates. Patient has signed opiate contract detailing the accountability and legitimate medical use of controlled substances. Recommend using the lowest effective dose only as needed  to relieve pain. KEN report requested and reviewed    Her mother is present throughout the visit today         1. Systemic lupus erythematosus, unspecified SLE type, unspecified organ involvement status    2. High risk medication use    3. Essential hypertension    4. Pain management        Assessment & Plan        Orders Placed This Encounter   Procedures    KESHIA by IFA, Reflex 9-biomarkers profile    CBC Auto Differential    Comprehensive Metabolic Panel    Beta-2 Glycoprotein Antibodies    Anticardiolipin Antibody, IgG / M, Qn    Lupus Anticoagulant Reflex    Cyclic Citrul Peptide Antibody, IgG / IgA    Rheumatoid Factor    CK    C-reactive Protein    Sedimentation Rate    Urinalysis With Culture If Indicated -    C4+C3    Protein / Creatinine Ratio, Urine - Urine, Clean Catch    Urine Drug Screen - Urine, Clean Catch     New Medications Ordered This Visit   Medications    DULoxetine (CYMBALTA) 60 MG capsule     Sig: Take 1 capsule by mouth Daily.     Dispense:  90 capsule     Refill:  1    hydroxychloroquine (PLAQUENIL) 200 MG tablet     Sig: Take 1 tablet by mouth 2 (Two) Times a Day.     Dispense:  180 tablet     Refill:  0    omeprazole (priLOSEC) 40 MG capsule     Sig: Take 1 capsule by mouth Daily. before a meal     Dispense:  90 capsule     Refill:  1    promethazine (PHENERGAN) 25 MG tablet     Sig: Take 1 tablet by mouth 3 (Three) Times a Day As Needed for Nausea or Vomiting.     Dispense:  90 tablet     Refill:  0    tiZANidine (ZANAFLEX) 4 MG tablet     Sig: Take 1 tablet by mouth Every 8 (Eight) Hours As Needed for Muscle Spasms.     Dispense:  90 tablet     Refill:  0    traMADol (ULTRAM) 50 MG tablet     Sig: Take 1 tablet by mouth Every 8 (Eight) Hours As Needed for Severe Pain.     Dispense:  270 tablet     Refill:  0       Follow Up:   Return in about 6 months (around 12/16/2025) for Followup APRN.      Discussed plan of care in detail with the patient today.  Patient verbalized understanding  and agrees.    I confirm accuracy of unchanged data/findings which have been carried forward from previous visit.  I have updated appropriately those that have changed.        Rodney Chou MD  [facility] [place] [facility]

## 2025-07-29 ENCOUNTER — OFFICE VISIT (OUTPATIENT)
Dept: FAMILY MEDICINE CLINIC | Facility: CLINIC | Age: 40
End: 2025-07-29
Payer: COMMERCIAL

## 2025-07-29 VITALS
BODY MASS INDEX: 40.47 KG/M2 | TEMPERATURE: 97.2 F | HEART RATE: 81 BPM | RESPIRATION RATE: 16 BRPM | DIASTOLIC BLOOD PRESSURE: 70 MMHG | OXYGEN SATURATION: 96 % | HEIGHT: 68 IN | SYSTOLIC BLOOD PRESSURE: 92 MMHG | WEIGHT: 267 LBS

## 2025-07-29 DIAGNOSIS — G43.E19 INTRACTABLE CHRONIC MIGRAINE WITH AURA AND WITHOUT STATUS MIGRAINOSUS: Chronic | ICD-10-CM

## 2025-07-29 DIAGNOSIS — I10 ESSENTIAL HYPERTENSION: Primary | Chronic | ICD-10-CM

## 2025-07-29 DIAGNOSIS — K21.9 GASTROESOPHAGEAL REFLUX DISEASE, UNSPECIFIED WHETHER ESOPHAGITIS PRESENT: Chronic | ICD-10-CM

## 2025-07-29 PROCEDURE — 1125F AMNT PAIN NOTED PAIN PRSNT: CPT | Performed by: NURSE PRACTITIONER

## 2025-07-29 PROCEDURE — 3078F DIAST BP <80 MM HG: CPT | Performed by: NURSE PRACTITIONER

## 2025-07-29 PROCEDURE — 1159F MED LIST DOCD IN RCRD: CPT | Performed by: NURSE PRACTITIONER

## 2025-07-29 PROCEDURE — 1160F RVW MEDS BY RX/DR IN RCRD: CPT | Performed by: NURSE PRACTITIONER

## 2025-07-29 PROCEDURE — 3074F SYST BP LT 130 MM HG: CPT | Performed by: NURSE PRACTITIONER

## 2025-07-29 PROCEDURE — 99214 OFFICE O/P EST MOD 30 MIN: CPT | Performed by: NURSE PRACTITIONER

## 2025-07-29 RX ORDER — LOSARTAN POTASSIUM 50 MG/1
50 TABLET ORAL DAILY
Qty: 90 TABLET | Refills: 1 | Status: SHIPPED | OUTPATIENT
Start: 2025-07-29

## 2025-07-29 RX ORDER — OMEPRAZOLE 40 MG/1
40 CAPSULE, DELAYED RELEASE ORAL DAILY
Qty: 90 CAPSULE | Refills: 1 | Status: SHIPPED | OUTPATIENT
Start: 2025-07-29

## 2025-07-29 NOTE — PROGRESS NOTES
"Milind Benítez is a 40 y.o. female.     Chief Complaint   Patient presents with    Hypertension    Heartburn       History of Present Illness  She presents for follow up of essential hypertension and gerd. She reports good blood pressure control. She reports good gerd control. She went to neurology who gave her botox. Notes reviewed. She also saw gynecology. Note reviewed. She saw rheumatology as well, note reviewed.   Hypertension  Chronicity:  Recurrent  Onset:  More than 1 year ago  Progression since onset:  Stable  Condition status:  Controlled  Associated symptoms: blurred vision, headaches, malaise/fatigue and orthopnea    Associated symptoms: no anxiety, no chest pain, no palpitations, no peripheral edema and no shortness of breath    Agents associated with hypertension:  Amphetamines and NSAIDs  Compliance problems:  Diet and exercise       The following portions of the patient's history were reviewed and updated as appropriate: allergies, current medications, past family history, past medical history, past social history, past surgical history and problem list.    Review of Systems   Constitutional:  Positive for malaise/fatigue.   Eyes:  Positive for blurred vision.   Respiratory:  Negative for shortness of breath.    Cardiovascular:  Positive for orthopnea. Negative for chest pain and palpitations.   Neurological:  Positive for headaches.       Objective     BP 92/70 (BP Location: Right arm, Patient Position: Sitting, Cuff Size: Large Adult)   Pulse 81   Temp 97.2 °F (36.2 °C) (Tympanic)   Resp 16   Ht 172.7 cm (67.99\")   Wt 121 kg (267 lb)   SpO2 96%   BMI 40.61 kg/m²     Physical Exam  Vitals reviewed.   Constitutional:       General: She is not in acute distress.     Appearance: Normal appearance. She is well-developed. She is not diaphoretic.   HENT:      Head: Normocephalic and atraumatic.   Cardiovascular:      Rate and Rhythm: Normal rate and regular rhythm.      Heart sounds: " Normal heart sounds, S1 normal and S2 normal. No murmur heard.     No friction rub. No gallop.   Pulmonary:      Effort: Pulmonary effort is normal. No respiratory distress.      Breath sounds: Normal breath sounds.   Abdominal:      General: Bowel sounds are normal. There is no distension.      Palpations: Abdomen is soft.      Tenderness: There is no abdominal tenderness.   Skin:     General: Skin is warm and dry.   Neurological:      Mental Status: She is alert and oriented to person, place, and time.   Psychiatric:         Behavior: Behavior normal.         Thought Content: Thought content normal.         Judgment: Judgment normal.         Current Outpatient Medications   Medication Sig Dispense Refill    acetaminophen (Tylenol 8 Hour Arthritis Pain) 650 MG 8 hr tablet Take 1 tablet by mouth Every 8 (Eight) Hours As Needed.      DULoxetine (CYMBALTA) 60 MG capsule Take 1 capsule by mouth Daily. 90 capsule 1    hydroxychloroquine (PLAQUENIL) 200 MG tablet Take 1 tablet by mouth 2 (Two) Times a Day. 180 tablet 0    lamoTRIgine (LaMICtal) 100 MG tablet Take 1 tablet by mouth 2 (Two) Times a Day.      losartan (Cozaar) 50 MG tablet Take 1 tablet by mouth Daily. 90 tablet 1    melatonin 5 MG tablet tablet Take 1 tablet by mouth Every Night.      Omega-3 Fatty Acids (Fish Oil) 600 MG capsule Take 2 capsules by mouth Daily.      omeprazole (priLOSEC) 40 MG capsule Take 1 capsule by mouth Daily. before a meal 90 capsule 1    promethazine (PHENERGAN) 25 MG tablet Take 1 tablet by mouth 3 (Three) Times a Day As Needed for Nausea or Vomiting. 90 tablet 0    propranolol (INDERAL) 20 MG tablet Take 1 tablet by mouth 2 (Two) Times a Day.      tiZANidine (ZANAFLEX) 4 MG tablet Take 1 tablet by mouth Every 8 (Eight) Hours As Needed for Muscle Spasms. 90 tablet 0    traMADol (ULTRAM) 50 MG tablet Take 1 tablet by mouth Every 8 (Eight) Hours As Needed for Severe Pain. 270 tablet 0    Tranexamic Acid (Lysteda) 650 MG tablet Take 2  tablets by mouth 3 times a day. During menses      ubrogepant (Ubrelvy) 100 MG tablet Take 1 tablet by mouth As Needed.      vitamin D3 125 MCG (5000 UT) capsule capsule Take 1 capsule by mouth Daily.       No current facility-administered medications for this visit.            Assessment & Plan     Problem List Items Addressed This Visit       Essential hypertension - Primary    Relevant Medications    losartan (Cozaar) 50 MG tablet    Migraine    Relevant Medications    ubrogepant (Ubrelvy) 100 MG tablet    Gastroesophageal reflux disease    Relevant Medications    omeprazole (priLOSEC) 40 MG capsule         ICD-10-CM ICD-9-CM   1. Essential hypertension  I10 401.9   2. Intractable chronic migraine with aura and without status migrainosus  G43.E19 346.01   3. Gastroesophageal reflux disease, unspecified whether esophagitis present  K21.9 530.81       Plan: She had a CBC and CMP in June.  Continue current medications.  Follow-up in 6 months.    @Body mass index is 40.61 kg/m².              Understands disease processes and need for medications.  Understands reasons for urgent and emergent care.  Patient (& family) verbalized agreement for treatment plan.   Emotional support and active listening provided.  Patient provided time to verbalize feelings.           Class 3 Severe Obesity (BMI >=40). Obesity-related health conditions include the following: hypertension. Obesity is unchanged. BMI is is above average; BMI management plan is completed. We discussed portion control and increasing exercise.      This document has been electronically signed by DANIEL Restrepo   July 29, 2025 13:19 EDT

## (undated) DEVICE — APPL CHLORAPREP W/TINT 26ML ORNG

## (undated) DEVICE — COR CYSTO: Brand: MEDLINE INDUSTRIES, INC.

## (undated) DEVICE — SKIN AFFIX SURG ADHESIVE 72/CS 0.55ML: Brand: MEDLINE

## (undated) DEVICE — ADAPT UROLOK

## (undated) DEVICE — GLW STD STR 3CM .035X150CM

## (undated) DEVICE — BG RESUSCITATOR INFANT BABYBLUE2

## (undated) DEVICE — Device

## (undated) DEVICE — HYDROGEL COATED LATEX URINE METER FOLEY TRAY,16 FR/CH (5.3 MM), 5 ML CATHETER PRE-CONNECTED TO 2000 ML DRAINAGE BAG WITH NEEDLE SAMPLING: Brand: DOVER

## (undated) DEVICE — PK C/SECT 70

## (undated) DEVICE — GW SUP AMPLATZ ULTR/STFF/STR PTFE SS 0.35IN 145CM

## (undated) DEVICE — URETERAL ACCESS SHEATH SET: Brand: NAVIGATOR

## (undated) DEVICE — TRY SPINE PENCAN 24GA X4IN

## (undated) DEVICE — UROLOGIC DRAIN BAG: Brand: UNBRANDED